# Patient Record
Sex: MALE | Race: WHITE | NOT HISPANIC OR LATINO | Employment: OTHER | URBAN - NONMETROPOLITAN AREA
[De-identification: names, ages, dates, MRNs, and addresses within clinical notes are randomized per-mention and may not be internally consistent; named-entity substitution may affect disease eponyms.]

---

## 2017-02-01 RX ORDER — AMLODIPINE BESYLATE AND BENAZEPRIL HYDROCHLORIDE 5; 20 MG/1; MG/1
CAPSULE ORAL
Qty: 90 CAPSULE | Refills: 2 | Status: SHIPPED | OUTPATIENT
Start: 2017-02-01 | End: 2017-10-01 | Stop reason: SDUPTHER

## 2017-10-02 RX ORDER — AMLODIPINE BESYLATE AND BENAZEPRIL HYDROCHLORIDE 5; 20 MG/1; MG/1
CAPSULE ORAL
Qty: 90 CAPSULE | Refills: 0 | Status: SHIPPED | OUTPATIENT
Start: 2017-10-02 | End: 2017-11-21 | Stop reason: SDUPTHER

## 2017-10-18 DIAGNOSIS — E78.5 HYPERLIPIDEMIA, UNSPECIFIED HYPERLIPIDEMIA TYPE: Primary | ICD-10-CM

## 2017-10-18 DIAGNOSIS — I10 ESSENTIAL HYPERTENSION: ICD-10-CM

## 2017-10-18 DIAGNOSIS — Z12.5 SCREENING FOR MALIGNANT NEOPLASM OF PROSTATE: ICD-10-CM

## 2017-10-18 DIAGNOSIS — R73.01 IMPAIRED FASTING GLUCOSE: ICD-10-CM

## 2017-11-07 ENCOUNTER — LAB (OUTPATIENT)
Dept: LAB | Facility: OTHER | Age: 57
End: 2017-11-07

## 2017-11-07 DIAGNOSIS — R73.01 IMPAIRED FASTING GLUCOSE: ICD-10-CM

## 2017-11-07 DIAGNOSIS — I10 ESSENTIAL HYPERTENSION: ICD-10-CM

## 2017-11-07 DIAGNOSIS — Z12.5 SCREENING FOR MALIGNANT NEOPLASM OF PROSTATE: ICD-10-CM

## 2017-11-07 DIAGNOSIS — E78.5 HYPERLIPIDEMIA, UNSPECIFIED HYPERLIPIDEMIA TYPE: ICD-10-CM

## 2017-11-07 LAB
ALBUMIN SERPL-MCNC: 4.5 G/DL (ref 3.2–5.5)
ALBUMIN/GLOB SERPL: 1.7 G/DL (ref 1–3)
ALP SERPL-CCNC: 65 U/L (ref 15–121)
ALT SERPL W P-5'-P-CCNC: 22 U/L (ref 10–60)
ANION GAP SERPL CALCULATED.3IONS-SCNC: 8 MMOL/L (ref 5–15)
AST SERPL-CCNC: 24 U/L (ref 10–60)
BASOPHILS # BLD AUTO: 0.03 10*3/MM3 (ref 0–0.2)
BASOPHILS NFR BLD AUTO: 0.7 % (ref 0–2)
BILIRUB SERPL-MCNC: 1.7 MG/DL (ref 0.2–1)
BUN BLD-MCNC: 19 MG/DL (ref 8–25)
BUN/CREAT SERPL: 15.8 (ref 7–25)
CALCIUM SPEC-SCNC: 9.8 MG/DL (ref 8.4–10.8)
CHLORIDE SERPL-SCNC: 103 MMOL/L (ref 100–112)
CHOLEST SERPL-MCNC: 149 MG/DL (ref 150–200)
CO2 SERPL-SCNC: 29 MMOL/L (ref 20–32)
CREAT BLD-MCNC: 1.2 MG/DL (ref 0.4–1.3)
DEPRECATED RDW RBC AUTO: 44.5 FL (ref 35.1–43.9)
EOSINOPHIL # BLD AUTO: 0.16 10*3/MM3 (ref 0–0.7)
EOSINOPHIL NFR BLD AUTO: 3.5 % (ref 0–7)
ERYTHROCYTE [DISTWIDTH] IN BLOOD BY AUTOMATED COUNT: 13.4 % (ref 11.5–14.5)
GFR SERPL CREATININE-BSD FRML MDRD: 62 ML/MIN/1.73 (ref 56–130)
GLOBULIN UR ELPH-MCNC: 2.6 GM/DL (ref 2.5–4.6)
GLUCOSE BLD-MCNC: 109 MG/DL (ref 70–100)
HBA1C MFR BLD: 5.4 % (ref 4–5.6)
HCT VFR BLD AUTO: 43.7 % (ref 39–49)
HDLC SERPL-MCNC: 34 MG/DL (ref 35–100)
HGB BLD-MCNC: 14.4 G/DL (ref 13.7–17.3)
LDLC SERPL CALC-MCNC: 102 MG/DL
LDLC/HDLC SERPL: 3.01 {RATIO}
LYMPHOCYTES # BLD AUTO: 0.96 10*3/MM3 (ref 0.6–4.2)
LYMPHOCYTES NFR BLD AUTO: 20.9 % (ref 10–50)
MCH RBC QN AUTO: 30.6 PG (ref 26.5–34)
MCHC RBC AUTO-ENTMCNC: 33 G/DL (ref 31.5–36.3)
MCV RBC AUTO: 93 FL (ref 80–98)
MONOCYTES # BLD AUTO: 0.54 10*3/MM3 (ref 0–0.9)
MONOCYTES NFR BLD AUTO: 11.8 % (ref 0–12)
NEUTROPHILS # BLD AUTO: 2.9 10*3/MM3 (ref 2–8.6)
NEUTROPHILS NFR BLD AUTO: 63.1 % (ref 37–80)
PLATELET # BLD AUTO: 192 10*3/MM3 (ref 150–450)
PMV BLD AUTO: 10.4 FL (ref 8–12)
POTASSIUM BLD-SCNC: 3.8 MMOL/L (ref 3.4–5.4)
PROT SERPL-MCNC: 7.1 G/DL (ref 6.7–8.2)
PSA SERPL-MCNC: 3.5 NG/ML (ref 0–4)
RBC # BLD AUTO: 4.7 10*6/MM3 (ref 4.37–5.74)
SODIUM BLD-SCNC: 140 MMOL/L (ref 134–146)
TRIGL SERPL-MCNC: 63 MG/DL (ref 35–160)
VLDLC SERPL-MCNC: 12.6 MG/DL
WBC NRBC COR # BLD: 4.59 10*3/MM3 (ref 3.2–9.8)

## 2017-11-07 PROCEDURE — 36415 COLL VENOUS BLD VENIPUNCTURE: CPT | Performed by: INTERNAL MEDICINE

## 2017-11-07 PROCEDURE — 83036 HEMOGLOBIN GLYCOSYLATED A1C: CPT | Performed by: INTERNAL MEDICINE

## 2017-11-07 PROCEDURE — 85025 COMPLETE CBC W/AUTO DIFF WBC: CPT | Performed by: INTERNAL MEDICINE

## 2017-11-07 PROCEDURE — 80061 LIPID PANEL: CPT | Performed by: INTERNAL MEDICINE

## 2017-11-07 PROCEDURE — 80053 COMPREHEN METABOLIC PANEL: CPT | Performed by: INTERNAL MEDICINE

## 2017-11-07 PROCEDURE — 84153 ASSAY OF PSA TOTAL: CPT | Performed by: INTERNAL MEDICINE

## 2017-11-21 ENCOUNTER — OFFICE VISIT (OUTPATIENT)
Dept: FAMILY MEDICINE CLINIC | Facility: CLINIC | Age: 57
End: 2017-11-21

## 2017-11-21 VITALS
HEIGHT: 73 IN | DIASTOLIC BLOOD PRESSURE: 80 MMHG | SYSTOLIC BLOOD PRESSURE: 160 MMHG | TEMPERATURE: 98 F | BODY MASS INDEX: 28.76 KG/M2 | HEART RATE: 68 BPM | WEIGHT: 217 LBS

## 2017-11-21 DIAGNOSIS — I10 ESSENTIAL HYPERTENSION: Primary | Chronic | ICD-10-CM

## 2017-11-21 DIAGNOSIS — R73.01 IMPAIRED FASTING GLUCOSE: Chronic | ICD-10-CM

## 2017-11-21 DIAGNOSIS — E78.2 MIXED HYPERLIPIDEMIA: Chronic | ICD-10-CM

## 2017-11-21 DIAGNOSIS — Z12.5 SPECIAL SCREENING FOR MALIGNANT NEOPLASM OF PROSTATE: ICD-10-CM

## 2017-11-21 DIAGNOSIS — E78.1 HYPERTRIGLYCERIDEMIA: Chronic | ICD-10-CM

## 2017-11-21 DIAGNOSIS — M72.2 PLANTAR FASCIITIS: Chronic | ICD-10-CM

## 2017-11-21 DIAGNOSIS — K21.9 GASTROESOPHAGEAL REFLUX DISEASE WITHOUT ESOPHAGITIS: Chronic | ICD-10-CM

## 2017-11-21 PROCEDURE — 99214 OFFICE O/P EST MOD 30 MIN: CPT | Performed by: INTERNAL MEDICINE

## 2017-11-21 RX ORDER — BISOPROLOL FUMARATE 5 MG/1
TABLET, FILM COATED ORAL
Qty: 45 TABLET | Refills: 3 | Status: SHIPPED | OUTPATIENT
Start: 2017-11-21 | End: 2018-11-28 | Stop reason: SDUPTHER

## 2017-11-21 RX ORDER — AMLODIPINE BESYLATE AND BENAZEPRIL HYDROCHLORIDE 5; 20 MG/1; MG/1
CAPSULE ORAL
Qty: 90 CAPSULE | Refills: 3 | Status: SHIPPED | OUTPATIENT
Start: 2017-11-21 | End: 2018-11-28 | Stop reason: SDUPTHER

## 2017-11-21 RX ORDER — OMEPRAZOLE 40 MG/1
40 CAPSULE, DELAYED RELEASE ORAL DAILY
Qty: 90 CAPSULE | Refills: 3 | Status: SHIPPED | OUTPATIENT
Start: 2017-11-21 | End: 2018-11-28 | Stop reason: SDUPTHER

## 2017-11-21 RX ORDER — VALSARTAN AND HYDROCHLOROTHIAZIDE 320; 25 MG/1; MG/1
1 TABLET, FILM COATED ORAL DAILY
Qty: 90 TABLET | Refills: 3 | Status: SHIPPED | OUTPATIENT
Start: 2017-11-21 | End: 2018-11-28 | Stop reason: SDUPTHER

## 2017-11-21 RX ORDER — VALSARTAN AND HYDROCHLOROTHIAZIDE 320; 25 MG/1; MG/1
1 TABLET, FILM COATED ORAL DAILY
COMMUNITY
End: 2017-11-21 | Stop reason: SDUPTHER

## 2017-11-21 NOTE — PROGRESS NOTES
Subjective        History of Present Illness     Rashaun Quintanilla is a 57 y.o. me who presents for annual follow up on hypertension, dyslipidemia, impaired fasting glucose, and GERD.  Rashaun has a history of tortuous/ectatic thoracic aorta and has had serial chest x-rays on an annual basis for several years, but we discontinued the annual evaluation to decrease radiation exposure.  Thoracic aorta x-ray 11/2016 revealed stability, and fact, the radiologist did not feel that the aorta was abnormal..    Weight is down 5 pounds in the past year.  Blood pressure is slightly above goal today.      Last colonoscopy was 11/2008. He will be due repeat in 2018.    He struggles with episodic flares of plantar fasciitis bilaterally.  Custom orthotic insoles were extremely helpful.  He is requesting a new prescription for new custom orthotic insoles, and I provided that today.    He is reporting some occasional episodes of rectal seepage.  On occasion, he will feel that there has been a little liquid  leaking from the rectum.  He will go to the bathroom and have the need to clean himself.  Usually, there is a small amount of brown liquid.  He denies more prominent fecal incontinence.  He denies any past rectal surgeries or procedures.  He is in a monogamous heterosexual marriage.    Renal function is stable with creatinine 1.2.  I continue to recommend he avoid chronic use of NSAIDs and other nephrotoxic drugs.     The patient's relevant past medical, surgical, and social history was reviewed in Epic.   Lab results are reviewed with the patient today.  CBC unremarkable.  Fasting glucose 109.  A1c is 5.4. Total cholesterol 149.   HDL 49.  .  Triglycerides 63.         Review of Systems   Constitutional: Negative for chills, fatigue and fever.   HENT: Negative for congestion, ear pain, postnasal drip, sinus pressure and sore throat.    Respiratory: Negative for cough, shortness of breath and wheezing.    Cardiovascular:  "Negative for chest pain, palpitations and leg swelling.   Gastrointestinal: Negative for abdominal pain, blood in stool, constipation, diarrhea, nausea and vomiting.   Endocrine: Negative for cold intolerance, heat intolerance, polydipsia and polyuria.   Genitourinary: Negative for dysuria, frequency, hematuria and urgency.   Skin: Negative for rash.   Neurological: Negative for syncope and weakness.        Objective     Vitals:    11/21/17 1053   BP: 160/80   Pulse: 68   Temp: 98 °F (36.7 °C)   TempSrc: Oral   Weight: 217 lb (98.4 kg)   Height: 73\" (185.4 cm)     Physical Exam   Constitutional: He is oriented to person, place, and time. He appears well-developed and well-nourished. No distress.   HENT:   Head: Normocephalic and atraumatic.   Nose: Right sinus exhibits no maxillary sinus tenderness and no frontal sinus tenderness. Left sinus exhibits no maxillary sinus tenderness and no frontal sinus tenderness.   Mouth/Throat: Uvula is midline, oropharynx is clear and moist and mucous membranes are normal. No oral lesions. No tonsillar exudate.   Eyes: Conjunctivae and EOM are normal. Pupils are equal, round, and reactive to light.   Neck: Trachea normal. Neck supple. No JVD present. Carotid bruit is not present. No tracheal deviation present. No thyroid mass and no thyromegaly present.   Cardiovascular: Normal rate, regular rhythm and normal heart sounds.   No extrasystoles are present. PMI is not displaced.    No murmur heard.  Pulmonary/Chest: Effort normal and breath sounds normal. No accessory muscle usage. No respiratory distress. He has no decreased breath sounds. He has no wheezes. He has no rhonchi. He has no rales.   Abdominal: Soft. Bowel sounds are normal. He exhibits no distension. There is no hepatosplenomegaly. There is no tenderness.       Vascular Status -  His exam exhibits right foot vasculature normal. His exam exhibits no right foot edema. His exam exhibits left foot vasculature normal. His " exam exhibits no left foot edema.  Lymphadenopathy:     He has no cervical adenopathy.   Neurological: He is alert and oriented to person, place, and time. No cranial nerve deficit. Coordination normal.   Skin: Skin is warm, dry and intact. No rash noted. No cyanosis. Nails show no clubbing.   Psychiatric: He has a normal mood and affect. His speech is normal and behavior is normal. Thought content normal.   Vitals reviewed.        Assessment/Plan      Continue the current antihypertensive regimen.  Monitor BP.  Recommended sodium restriction.  Notify me if systolic blood pressure is not consistently less than 140.    I would prefer not to have this patient on both ACE inhibitor and ARB, but without it, he would require blood pressure medication with greater side effect potential.  If his renal function starts to deteriorate on dual CONOR therapy, we will make appropriate changes.    He will be due repeat colonoscopy next year.  We will plan to schedule that with next year's annual exam.  We reviewed some exercises to try to strengthen the pubococcygeus muscles and muscles of the perineal area.  Notify me if the episodic rectal leakage persists or worsens.    I have given him a prescription for custom orthotic insoles to help address his plantar fasciitis.  If the cost of these becomes prohibitive, I also recommended power step insoles.    Continue Prilosec and other prescription medications and vitamin and mineral supplements to treat additional medical problems which we addressed today.  Return in one year for annual exam with fasting labs one week prior.        Scribed for Dr. Hammonds by Arlette Redd [facility].     Diagnoses and all orders for this visit:    Essential hypertension  -     CBC Auto Differential; Future  -     Comprehensive Metabolic Panel; Future  -     Hemoglobin A1c; Future  -     Lipid Panel; Future  -     PSA Screen; Future  -     TSH; Future    Mixed hyperlipidemia  -     CBC Auto Differential;  Future  -     Comprehensive Metabolic Panel; Future  -     Hemoglobin A1c; Future  -     Lipid Panel; Future  -     PSA Screen; Future  -     TSH; Future    Hypertriglyceridemia    Gastroesophageal reflux disease without esophagitis    Impaired fasting glucose  -     Hemoglobin A1c; Future    Special screening for malignant neoplasm of prostate  -     PSA Screen; Future    Plantar fasciitis    Other orders  -     Discontinue: valsartan-hydrochlorothiazide (DIOVAN-HCT) 320-25 MG per tablet; Take 1 tablet by mouth Daily.  -     amLODIPine-benazepril (LOTREL 5-20) 5-20 MG per capsule; Take one capsule daily  -     bisoprolol (ZEBeta) 5 MG tablet; Take 1/2 tablet daily  -     valsartan-hydrochlorothiazide (DIOVAN-HCT) 320-25 MG per tablet; Take 1 tablet by mouth Daily.  -     omeprazole (priLOSEC) 40 MG capsule; Take 1 capsule by mouth Daily.        Lab on 11/07/2017   Component Date Value Ref Range Status   • Glucose 11/07/2017 109* 70 - 100 mg/dL Final   • BUN 11/07/2017 19  8 - 25 mg/dL Final   • Creatinine 11/07/2017 1.20  0.40 - 1.30 mg/dL Final   • Sodium 11/07/2017 140  134 - 146 mmol/L Final   • Potassium 11/07/2017 3.8  3.4 - 5.4 mmol/L Final   • Chloride 11/07/2017 103  100 - 112 mmol/L Final   • CO2 11/07/2017 29.0  20.0 - 32.0 mmol/L Final   • Calcium 11/07/2017 9.8  8.4 - 10.8 mg/dL Final   • Total Protein 11/07/2017 7.1  6.7 - 8.2 g/dL Final   • Albumin 11/07/2017 4.50  3.20 - 5.50 g/dL Final   • ALT (SGPT) 11/07/2017 22  10 - 60 U/L Final   • AST (SGOT) 11/07/2017 24  10 - 60 U/L Final   • Alkaline Phosphatase 11/07/2017 65  15 - 121 U/L Final   • Total Bilirubin 11/07/2017 1.7* 0.2 - 1.0 mg/dL Final   • eGFR Non African Amer 11/07/2017 62  56 - 130 mL/min/1.73 Final   • Globulin 11/07/2017 2.6  2.5 - 4.6 gm/dL Final   • A/G Ratio 11/07/2017 1.7  1.0 - 3.0 g/dL Final   • BUN/Creatinine Ratio 11/07/2017 15.8  7.0 - 25.0 Final   • Anion Gap 11/07/2017 8.0  5.0 - 15.0 mmol/L Final   • Hemoglobin A1C  11/07/2017 5.4  4 - 5.6 % Final   • Total Cholesterol 11/07/2017 149* 150 - 200 mg/dL Final   • Triglycerides 11/07/2017 63  35 - 160 mg/dL Final   • HDL Cholesterol 11/07/2017 34* 35 - 100 mg/dL Final   • LDL Cholesterol  11/07/2017 102  mg/dL Final   • VLDL Cholesterol 11/07/2017 12.6  mg/dL Final   • LDL/HDL Ratio 11/07/2017 3.01   Final   • PSA 11/07/2017 3.500  0.000 - 4.000 ng/mL Final   • WBC 11/07/2017 4.59  3.20 - 9.80 10*3/mm3 Final   • RBC 11/07/2017 4.70  4.37 - 5.74 10*6/mm3 Final   • Hemoglobin 11/07/2017 14.4  13.7 - 17.3 g/dL Final   • Hematocrit 11/07/2017 43.7  39.0 - 49.0 % Final   • MCV 11/07/2017 93.0  80.0 - 98.0 fL Final   • MCH 11/07/2017 30.6  26.5 - 34.0 pg Final   • MCHC 11/07/2017 33.0  31.5 - 36.3 g/dL Final   • RDW 11/07/2017 13.4  11.5 - 14.5 % Final   • RDW-SD 11/07/2017 44.5* 35.1 - 43.9 fl Final   • MPV 11/07/2017 10.4  8.0 - 12.0 fL Final   • Platelets 11/07/2017 192  150 - 450 10*3/mm3 Final   • Neutrophil % 11/07/2017 63.1  37.0 - 80.0 % Final   • Lymphocyte % 11/07/2017 20.9  10.0 - 50.0 % Final   • Monocyte % 11/07/2017 11.8  0.0 - 12.0 % Final   • Eosinophil % 11/07/2017 3.5  0.0 - 7.0 % Final   • Basophil % 11/07/2017 0.7  0.0 - 2.0 % Final   • Neutrophils, Absolute 11/07/2017 2.90  2.00 - 8.60 10*3/mm3 Final   • Lymphocytes, Absolute 11/07/2017 0.96  0.60 - 4.20 10*3/mm3 Final   • Monocytes, Absolute 11/07/2017 0.54  0.00 - 0.90 10*3/mm3 Final   • Eosinophils, Absolute 11/07/2017 0.16  0.00 - 0.70 10*3/mm3 Final   • Basophils, Absolute 11/07/2017 0.03  0.00 - 0.20 10*3/mm3 Final   ]

## 2017-11-21 NOTE — PATIENT INSTRUCTIONS
Exercising to Lose Weight  Exercising can help you to lose weight. In order to lose weight through exercise, you need to do vigorous-intensity exercise. You can tell that you are exercising with vigorous intensity if you are breathing very hard and fast and cannot hold a conversation while exercising.  Moderate-intensity exercise helps to maintain your current weight. You can tell that you are exercising at a moderate level if you have a higher heart rate and faster breathing, but you are still able to hold a conversation.  HOW OFTEN SHOULD I EXERCISE?  Choose an activity that you enjoy and set realistic goals. Your health care provider can help you to make an activity plan that works for you. Exercise regularly as directed by your health care provider. This may include:  · Doing resistance training twice each week, such as:    Push-ups.    Sit-ups.    Lifting weights.    Using resistance bands.  · Doing a given intensity of exercise for a given amount of time. Choose from these options:    150 minutes of moderate-intensity exercise every week.    75 minutes of vigorous-intensity exercise every week.    A mix of moderate-intensity and vigorous-intensity exercise every week.  Children, pregnant women, people who are out of shape, people who are overweight, and older adults may need to consult a health care provider for individual recommendations. If you have any sort of medical condition, be sure to consult your health care provider before starting a new exercise program.  WHAT ARE SOME ACTIVITIES THAT CAN HELP ME TO LOSE WEIGHT?   · Walking at a rate of at least 4.5 miles an hour.  · Jogging or running at a rate of 5 miles per hour.  · Biking at a rate of at least 10 miles per hour.  · Lap swimming.  · Roller-skating or in-line skating.  · Cross-country skiing.  · Vigorous competitive sports, such as football, basketball, and soccer.  · Jumping rope.  · Aerobic dancing.  HOW CAN I BE MORE ACTIVE IN MY DAY-TO-DAY  ACTIVITIES?  · Use the stairs instead of the elevator.  · Take a walk during your lunch break.  · If you drive, park your car farther away from work or school.  · If you take public transportation, get off one stop early and walk the rest of the way.  · Make all of your phone calls while standing up and walking around.  · Get up, stretch, and walk around every 30 minutes throughout the day.  WHAT GUIDELINES SHOULD I FOLLOW WHILE EXERCISING?  · Do not exercise so much that you hurt yourself, feel dizzy, or get very short of breath.  · Consult your health care provider prior to starting a new exercise program.  · Wear comfortable clothes and shoes with good support.  · Drink plenty of water while you exercise to prevent dehydration or heat stroke. Body water is lost during exercise and must be replaced.  · Work out until you breathe faster and your heart beats faster.     This information is not intended to replace advice given to you by your health care provider. Make sure you discuss any questions you have with your health care provider.     Document Released: 01/20/2012 Document Revised: 01/08/2016 Document Reviewed: 05/21/2015  [employer] Interactive Patient Education ©2017 [employer]      Calorie Counting for Weight Loss  Calories are energy you get from the things you eat and drink. Your body uses this energy to keep you going throughout the day. The number of calories you eat affects your weight. When you eat more calories than your body needs, your body stores the extra calories as fat. When you eat fewer calories than your body needs, your body burns fat to get the energy it needs.  Calorie counting means keeping track of how many calories you eat and drink each day. If you make sure to eat fewer calories than your body needs, you should lose weight. In order for calorie counting to work, you will need to eat the number of calories that are right for you in a day to lose a healthy amount of weight per week.  A healthy amount of weight to lose per week is usually 1-2 lb (0.5-0.9 kg). A dietitian can determine how many calories you need in a day and give you suggestions on how to reach your calorie goal.   WHAT IS MY MY PLAN?  My goal is to have __________ calories per day.   If I have this many calories per day, I should lose around __________ pounds per week.  WHAT DO I NEED TO KNOW ABOUT CALORIE COUNTING?  In order to meet your daily calorie goal, you will need to:  · Find out how many calories are in each food you would like to eat. Try to do this before you eat.  · Decide how much of the food you can eat.  · Write down what you ate and how many calories it had. Doing this is called keeping a food log.  WHERE DO I FIND CALORIE INFORMATION?  The number of calories in a food can be found on a Nutrition Facts label. Note that all the information on a label is based on a specific serving of the food. If a food does not have a Nutrition Facts label, try to look up the calories online or ask your dietitian for help.  HOW DO I DECIDE HOW MUCH TO EAT?  To decide how much of the food you can eat, you will need to consider both the number of calories in one serving and the size of one serving. This information can be found on the Nutrition Facts label. If a food does not have a Nutrition Facts label, look up the information online or ask your dietitian for help.  Remember that calories are listed per serving. If you choose to have more than one serving of a food, you will have to multiply the calories per serving by the amount of servings you plan to eat. For example, the label on a package of bread might say that a serving size is 1 slice and that there are 90 calories in a serving. If you eat 1 slice, you will have eaten 90 calories. If you eat 2 slices, you will have eaten 180 calories.  HOW DO I KEEP A FOOD LOG?  After each meal, record the following information in your food log:  · What you ate.  · How much of it you  ate.  · How many calories it had.  · Then, add up your calories.  Keep your food log near you, such as in a small notebook in your pocket. Another option is to use a mobile marcus or website. Some programs will calculate calories for you and show you how many calories you have left each time you add an item to the log.  WHAT ARE SOME CALORIE COUNTING TIPS?  · Use your calories on foods and drinks that will fill you up and not leave you hungry. Some examples of this include foods like nuts and nut butters, vegetables, lean proteins, and high-fiber foods (more than 5 g fiber per serving).  · Eat nutritious foods and avoid empty calories. Empty calories are calories you get from foods or beverages that do not have many nutrients, such as candy and soda. It is better to have a nutritious high-calorie food (such as an avocado) than a food with few nutrients (such as a bag of chips).  · Know how many calories are in the foods you eat most often. This way, you do not have to look up how many calories they have each time you eat them.  · Look out for foods that may seem like low-calorie foods but are really high-calorie foods, such as baked goods, soda, and fat-free candy.  · Pay attention to calories in drinks. Drinks such as sodas, specialty coffee drinks, alcohol, and juices have a lot of calories yet do not fill you up. Choose low-calorie drinks like water and diet drinks.  · Focus your calorie counting efforts on higher calorie items. Logging the calories in a garden salad that contains only vegetables is less important than calculating the calories in a milk shake.  · Find a way of tracking calories that works for you. Get creative. Most people who are successful find ways to keep track of how much they eat in a day, even if they do not count every calorie.  WHAT ARE SOME PORTION CONTROL TIPS?  · Know how many calories are in a serving. This will help you know how many servings of a certain food you can have.  · Use a  measuring cup to measure serving sizes. This is helpful when you start out. With time, you will be able to estimate serving sizes for some foods.  · Take some time to put servings of different foods on your favorite plates, bowls, and cups so you know what a serving looks like.  · Try not to eat straight from a bag or box. Doing this can lead to overeating. Put the amount you would like to eat in a cup or on a plate to make sure you are eating the right portion.  · Use smaller plates, glasses, and bowls to prevent overeating. This is a quick and easy way to practice portion control. If your plate is smaller, less food can fit on it.  · Try not to multitask while eating, such as watching TV or using your computer. If it is time to eat, sit down at a table and enjoy your food. Doing this will help you to start recognizing when you are full. It will also make you more aware of what and how much you are eating.  HOW CAN I CALORIE COUNT WHEN EATING OUT?  · Ask for smaller portion sizes or child-sized portions.  · Consider sharing an entree and sides instead of getting your own entree.  · If you get your own entree, eat only half. Ask for a box at the beginning of your meal and put the rest of your entree in it so you are not tempted to eat it.  · Look for the calories on the menu. If calories are listed, choose the lower calorie options.  · Choose dishes that include vegetables, fruits, whole grains, low-fat dairy products, and lean protein. Focusing on smart food choices from each of the 5 food groups can help you stay on track at restaurants.  · Choose items that are boiled, broiled, grilled, or steamed.  · Choose water, milk, unsweetened iced tea, or other drinks without added sugars. If you want an alcoholic beverage, choose a lower calorie option. For example, a regular sarah can have up to 700 calories and a glass of wine has around 150.  · Stay away from items that are buttered, battered, fried, or served with  "cream sauce. Items labeled \"crispy\" are usually fried, unless stated otherwise.  · Ask for dressings, sauces, and syrups on the side. These are usually very high in calories, so do not eat much of them.  · Watch out for salads. Many people think salads are a healthy option, but this is often not the case. Many salads come with coon, fried chicken, lots of cheese, fried chips, and dressing. All of these items have a lot of calories. If you want a salad, choose a garden salad and ask for grilled meats or steak. Ask for the dressing on the side, or ask for olive oil and vinegar or lemon to use as dressing.  · Estimate how many servings of a food you are given. For example, a serving of cooked rice is ½ cup or about the size of half a tennis ball or one cupcake wrapper. Knowing serving sizes will help you be aware of how much food you are eating at restaurants. The list below tells you how big or small some common portion sizes are based on everyday objects.    1 oz--4 stacked dice.    3 oz--1 deck of cards.    1 tsp--1 dice.    1 Tbsp--½ a Ping-Pong ball.    2 Tbsp--1 Ping-Pong ball.    ½ cup--1 tennis ball or 1 cupcake wrapper.    1 cup--1 baseball.     This information is not intended to replace advice given to you by your health care provider. Make sure you discuss any questions you have with your health care provider.     Document Released: 12/18/2006 Document Revised: 01/08/2016 Document Reviewed: 10/23/2014  [employer] Interactive Patient Education ©2017 [employer]    "

## 2017-12-12 ENCOUNTER — HOSPITAL ENCOUNTER (OUTPATIENT)
Dept: PHYSICAL THERAPY | Facility: HOSPITAL | Age: 57
Setting detail: THERAPIES SERIES
Discharge: HOME | End: 2017-12-12

## 2017-12-12 ENCOUNTER — TRANSCRIBE ORDERS (OUTPATIENT)
Dept: FAMILY MEDICINE CLINIC | Facility: CLINIC | Age: 57
End: 2017-12-12

## 2017-12-12 DIAGNOSIS — M72.2 PLANTAR FASCIAL FIBROMATOSIS: Primary | ICD-10-CM

## 2017-12-12 PROCEDURE — 97161 PT EVAL LOW COMPLEX 20 MIN: CPT | Performed by: PHYSICAL THERAPIST

## 2017-12-12 NOTE — THERAPY EVALUATION
Outpatient Physical Therapy Ortho Initial Evaluation  [facility]     Patient Name: Rashaun Quintanilla  : 1960  MRN: [id number]  Today's Date: 2017      Visit Date: 2017    Patient Active Problem List   Diagnosis   • Chest pain   • Essential hypertension   • Viral gastroenteritis   • Ventricular premature depolarization   • Sprain of foot, right   • Screening for malignant neoplasm of prostate   • Plantar fasciitis   • Osteoarthritis of knee   • Male erectile disorder   • Lung nodule, solitary   • Lipoma of skin and subcutaneous tissue of neck   • Knee pain   • Joint effusion of the lower leg   • Injury of ankle, left   • Impaired fasting glucose   • Hypertriglyceridemia   • Hyperlipidemia   • H/O peptic ulcer   • GERD (gastroesophageal reflux disease)   • Gastritis   • Foot pain   • Eczema of hand   • Dysuria   • Dyslipidemia   • Cardiomegaly   • Allergic rhinitis   • Abnormal chest x-ray   • Benign non-nodular prostatic hyperplasia with lower urinary tract symptoms        Past Medical History:   Diagnosis Date   • Abnormal chest x-ray    • Allergic rhinitis     Seasonal    • Cardiomegaly     Tendencies towards      • Chest pain    • Dyslipidemia    • Dysuria    • Eczema of hand     Bilateral hands      • Essential hypertension     On multidrug therapy      • Foot pain    • Gastritis    • GERD (gastroesophageal reflux disease)     Change omeprazole to Zantac twice daily, 2016      • H/O peptic ulcer    • Hyperlipidemia     Currently diet controlled      • Hypertriglyceridemia     low HDL      • Impaired fasting glucose     Impaired fasting glycaemia      • Injury of ankle, left     Left ankle sprain      • Joint effusion of the lower leg     Right knee. With episodic pain and occasional instability      • Knee pain     Right. With episodes of mild medial instability   • Lipoma of skin and subcutaneous tissue of neck      Apparent subepidermal cyst anterior neck. Not infected      • Lung  nodule, solitary     Right upper lobe, noted at [facility] urgent care, 5/2016. Chest x-ray repeated by me, 5/16/2016   • Male erectile disorder     exacerbated by HCTZ      • Osteoarthritis of knee     O/A, DJD      • Plantar fasciitis    • Screening for malignant neoplasm of prostate    • Sprain of foot, right    • Ventricular premature depolarization     Unifocal PVCs noted on [facility] screening EKG, summer 2015. started bisoprolol and referred to Dr. Braun, 11/2015.        Past Surgical History:   Procedure Laterality Date   • ENDOSCOPY  11/2008    Colon endoscopy 93378 (1)        Visit Dx:     ICD-10-CM ICD-9-CM   1. Plantar fascial fibromatosis M72.2 728.71             Patient History       12/12/17 0815          History    Chief Complaint Other 1 (comment)   Need for new orthotics   -BB      Date Current Problem(s) Began --   Chronic   -BB      Brief Description of Current Complaint Present today with need for new orthotics with current pair wore out and broken with date of November 2015. Reports history of 4 or so pairs of orthotics and notes that he can tell when he wears shoes without them. Notes that with the orthotics the pain in his feet is relieved. No numbness or tingling. No sores reported  -BB      Previous treatment for THIS PROBLEM --   History of orthotics  -BB      Patient/Caregiver Goals Other (comment)   New orthotics  -BB      Hand Dominance right-handed  -BB      Pain     Pain at Present 0  -BB      Pain at Best 0  -BB      Pain at Worst 0  -BB      What Performance Factors Make the Current Problem(s) WORSE? Not wearing orthotics  -BB      What Performance Factors Make the Current Problem(s) BETTER? wearing orthotics  -BB      Pain Comments Reports no pain with orthotics present  -BB      Is your sleep disturbed? No  -BB      Fall Risk Assessment    Any falls in the past year: No  -BB      Number of falls reported in the last 12 months 0  -BB        User Key  (r) = Recorded By, (t) = Taken By, (c)  = Cosigned By    Initials Name Provider Type    ISAIAS Sears PT Physical Therapist                PT Ortho       12/12/17 0815    Subjective Comments    Subjective Comments See patient history   -BB    Precautions and Contraindications    Precautions/Limitations no known precautions/limitations  -BB    Subjective Pain    Able to rate subjective pain? yes  -BB    Pre-Treatment Pain Level 0  -BB    Post-Treatment Pain Level 0  -BB    Posture/Observations    Posture/Observations Comments Bilateral pes planus with overpronation. Bilateral dry cracking heels noted. Current orthotics cracked with loss of support.    -BB    Special Tests/Palpation    Special Tests/Palpation Ankle/Foot  -BB    Foot/Ankle Palpation    Foot/Ankle Palpation? Yes   No significant ttp noted.   -BB    ROM (Range of Motion)    General ROM no range of motion deficits identified  -BB    MMT (Manual Muscle Testing)    General MMT Assessment no strength deficits identified  -BB    Flexibility    Flexibility Tested? Lower Extremity  -BB    Lower Extremity Flexibility    Gastrocnemius WNL;Bilateral:  -BB      User Key  (r) = Recorded By, (t) = Taken By, (c) = Cosigned By    Initials Name Provider Type    ISAIAS Sears PT Physical Therapist                      Therapy Education  Education Details: wear schedule, lotion tx for dry heels  Given: Symptoms/condition management  Program: New  How Provided: Verbal  Provided to: Patient           PT OP Goals       12/12/17 0815       PT Short Term Goals    STG Date to Achieve 01/02/18  -BB     STG 1 Good fit/mold of orthotics   -BB     STG 1 Progress New  -BB     STG 2 Independent in wear/care of orthotics  -BB     STG 2 Progress New  -BB     Time Calculation    PT Goal Re-Cert Due Date 01/02/18  -BB       User Key  (r) = Recorded By, (t) = Taken By, (c) = Cosigned By    Initials Name Provider Type    ISAIAS Sears PT Physical Therapist                PT Assessment/Plan       12/12/17 0815        PT Assessment    Functional Limitations Impaired gait  -BB     Impairments Gait;Pain  -BB     Assessment Comments Patient presents today with complaints of current orthotics broken and lose of support that were made 2015 and wishing for a replacement pair. Patient reports current orthotics have worked well without complaints and would like a duplicate pair. Patient has limited great toe extension, bilateral pes planus with increased cassius abnormality noted along 5th ray. Patient could benefit from custom orthotics to continue to improve biomechanics during gait to continue to assist plantar fasciitis. Aqua temps to be made with full posting.   -BB     Please refer to paper survey for additional self-reported information No  -BB     Rehab Potential Good  -BB     Patient/caregiver participated in establishment of treatment plan and goals Yes  -BB     Patient would benefit from skilled therapy intervention Yes  -BB     PT Plan    Predicted Duration of Therapy Intervention (days/wks) Orthotic pickup   -BB     Planned CPT's? PT EVAL LOW COMPLEXITY: 68750;PT ORTHOTIC MGMT/TRAIN EA 15 MIN: 04423  -BB     Physical Therapy Interventions (Optional Details) orthotic fitting/training  -BB     PT Plan Comments Orthotic pickup   -BB       User Key  (r) = Recorded By, (t) = Taken By, (c) = Cosigned By    Initials Name Provider Type    BB Kaitlin Sears, PT Physical Therapist                  Exercises       12/12/17 0815          Subjective Comments    Subjective Comments See patient history   -BB      Subjective Pain    Able to rate subjective pain? yes  -BB      Pre-Treatment Pain Level 0  -BB      Post-Treatment Pain Level 0  -BB        User Key  (r) = Recorded By, (t) = Taken By, (c) = Cosigned By    Initials Name Provider Type    BB Kaitlin Sears, PT Physical Therapist                                  Time Calculation:   Start Time: 0815  Stop Time: 0838  Time Calculation (min): 23 min  Total Timed Code Minutes- PT: 0  minute(s)     Therapy Charges for Today     Code Description Service Date Service Provider Modifiers Qty    67352180712 HC PT EVAL LOW COMPLEXITY 1 12/12/2017 Kaitlin Sears, PT GP 1    36683164986 HC PT-CUSTOM ORTHOTICS-LEVEL 2 12/12/2017 Kaitlin Sears PT  1                    Kaitlin Sears, PT  12/12/2017

## 2018-01-29 ENCOUNTER — OFFICE VISIT (OUTPATIENT)
Dept: FAMILY MEDICINE CLINIC | Facility: CLINIC | Age: 58
End: 2018-01-29

## 2018-01-29 VITALS
WEIGHT: 223 LBS | HEART RATE: 75 BPM | SYSTOLIC BLOOD PRESSURE: 150 MMHG | DIASTOLIC BLOOD PRESSURE: 86 MMHG | HEIGHT: 73 IN | BODY MASS INDEX: 29.55 KG/M2 | TEMPERATURE: 99.1 F | OXYGEN SATURATION: 98 %

## 2018-01-29 DIAGNOSIS — J20.9 ACUTE BRONCHITIS, UNSPECIFIED ORGANISM: Primary | ICD-10-CM

## 2018-01-29 DIAGNOSIS — R50.9 FEVER, UNSPECIFIED FEVER CAUSE: ICD-10-CM

## 2018-01-29 DIAGNOSIS — I10 ESSENTIAL HYPERTENSION: Chronic | ICD-10-CM

## 2018-01-29 DIAGNOSIS — J06.9 VIRAL URI WITH COUGH: ICD-10-CM

## 2018-01-29 LAB
FLUAV AG NPH QL: NEGATIVE
FLUBV AG NPH QL IA: NEGATIVE

## 2018-01-29 PROCEDURE — 99213 OFFICE O/P EST LOW 20 MIN: CPT | Performed by: INTERNAL MEDICINE

## 2018-01-29 PROCEDURE — 87804 INFLUENZA ASSAY W/OPTIC: CPT | Performed by: INTERNAL MEDICINE

## 2018-01-29 RX ORDER — AZITHROMYCIN 250 MG/1
TABLET, FILM COATED ORAL
Qty: 6 TABLET | Refills: 0 | Status: SHIPPED | OUTPATIENT
Start: 2018-01-29 | End: 2018-06-18

## 2018-01-29 RX ORDER — CLONIDINE HYDROCHLORIDE 0.1 MG/1
0.1 TABLET ORAL 2 TIMES DAILY
Qty: 60 TABLET | Refills: 11 | Status: SHIPPED | OUTPATIENT
Start: 2018-01-29 | End: 2018-11-28 | Stop reason: ALTCHOICE

## 2018-01-29 RX ORDER — PHENYLEPHRINE HCL 10 MG/1
TABLET, FILM COATED ORAL
Qty: 36 TABLET | Refills: 0 | Status: SHIPPED | OUTPATIENT
Start: 2018-01-29 | End: 2019-03-20 | Stop reason: SDUPTHER

## 2018-01-29 NOTE — PROGRESS NOTES
Subjective     Rashaun Quintanilla is a 57 y.o. male.     History of Present Illness   Rashaun reports a 2 day history of URI symptoms including nasal congestion and postnasal drip.  He denies sore throat or ear pain.  He denies facial pain or purulent nasal discharge.  He has a mostly nonproductive cough.  Secretions have been clear.  He denies much nasal discharge.  He denies flulike body aches and pains.  He denies headaches or fevers or chills.  He has a low-grade fever today.  No sick contacts, including no recent exposures to influenza to his knowledge.  He used a dose of cough medicine before bed last night and slept well.  He received his influenza vaccination last fall.    The patient's blood pressure has been consistently above goal at home.  He brought in a blood pressure diary for my review revealing systolic blood pressure averaging in the low 150s.  Diastolics are usually normal.  He continues on 5 drug antihypertensive therapy which currently includes both an ACE inhibitor and angiotensin reuptake blocker, but his renal function has been normal.  There is a strong family history for hypertension.  Discussed and considered options today.      Review of Systems   Constitutional: Negative for chills, fatigue and fever.   HENT: Positive for congestion and postnasal drip. Negative for ear pain, sinus pressure and sore throat.    Respiratory: Positive for cough. Negative for shortness of breath and wheezing.    Cardiovascular: Negative for chest pain, palpitations and leg swelling.   Gastrointestinal: Negative for abdominal pain, blood in stool, constipation, diarrhea, nausea and vomiting.   Endocrine: Negative for cold intolerance, heat intolerance, polydipsia and polyuria.   Genitourinary: Negative for dysuria, frequency, hematuria and urgency.   Skin: Negative for rash.   Neurological: Negative for syncope and weakness.       Objective     /86  Pulse 75  Temp 99.1 °F (37.3 °C) (Oral)   Ht 185.4 cm  "(73\")  Wt 101 kg (223 lb)  SpO2 98%  BMI 29.42 kg/m2    Physical Exam   Constitutional: He is oriented to person, place, and time. He appears well-developed and well-nourished. No distress.   HENT:   Head: Normocephalic and atraumatic.   Nose: Right sinus exhibits no maxillary sinus tenderness and no frontal sinus tenderness. Left sinus exhibits no maxillary sinus tenderness and no frontal sinus tenderness.   Mouth/Throat: Uvula is midline, oropharynx is clear and moist and mucous membranes are normal. No oral lesions. No tonsillar exudate.   Nasal mucosa is congested   Eyes: Conjunctivae and EOM are normal. Pupils are equal, round, and reactive to light.   Neck: Trachea normal. Neck supple. No JVD present. Carotid bruit is not present. No tracheal deviation present. No thyroid mass and no thyromegaly present.   Cardiovascular: Normal rate, regular rhythm, normal heart sounds and intact distal pulses.   No extrasystoles are present. PMI is not displaced.    No murmur heard.  Pulmonary/Chest: Effort normal. No accessory muscle usage. No respiratory distress. He has no decreased breath sounds. He has no wheezes. He has no rhonchi. He has no rales.   Some increased bronchial sounds on expiratory phase of cough bilaterally.  No wheezes.   Abdominal: Soft. Bowel sounds are normal. He exhibits no distension. There is no hepatosplenomegaly. There is no tenderness.       Vascular Status -  His exam exhibits right foot vasculature normal. His exam exhibits no right foot edema. His exam exhibits left foot vasculature normal. His exam exhibits no left foot edema.  Lymphadenopathy:     He has no cervical adenopathy.   Neurological: He is alert and oriented to person, place, and time. No cranial nerve deficit. Coordination normal.   Skin: Skin is warm, dry and intact. No rash noted. No cyanosis. Nails show no clubbing.   Psychiatric: He has a normal mood and affect. His speech is normal and behavior is normal. Judgment and " thought content normal.   Vitals reviewed.      PHQ-2/PHQ-9 Depression Screening 11/21/2017   Little interest or pleasure in doing things 0   Feeling down, depressed, or hopeless 0   Trouble falling or staying asleep, or sleeping too much 0   Feeling tired or having little energy 0   Poor appetite or overeating 0   Feeling bad about yourself - or that you are a failure or have let yourself or your family down 0   Trouble concentrating on things, such as reading the newspaper or watching television 0   Moving or speaking so slowly that other people could have noticed. Or the opposite - being so fidgety or restless that you have been moving around a lot more than usual 0   Thoughts that you would be better off dead, or of hurting yourself in some way 0   Total Score 0   If you checked off any problems, how difficult have these problems made it for you to do your work, take care of things at home, or get along with other people? Not difficult at all       Assessment/Plan     Z-Pradeep as directed.  Phenylephrine as needed for congestion.  Delsym cough syrup as needed.  OTC Tylenol for any low-grade fever or aches or pains.    Continue the current doses of Lotrel, bisoprolol, and Diovan HCT.  Add clonidine 0.1 mg twice a day.  Continue to monitor blood pressure at home and notify me if not at goal.    Diagnoses and all orders for this visit:    Acute bronchitis, unspecified organism    Fever, unspecified fever cause  -     Influenza Antigen, Rapid - Swab, Nasopharynx    Viral URI with cough    Essential hypertension    Other orders  -     CloNIDine (CATAPRES) 0.1 MG tablet; Take 1 tablet by mouth 2 (Two) Times a Day.  -     azithromycin (ZITHROMAX) 250 MG tablet; Take 2 tablets the first day, then 1 tablet daily for 4 days.  -     phenylephrine (SUDAFED PE) 10 MG tablet; 1 po 2-3 times daily prn congestion        No visits with results within 3 Week(s) from this visit.  Latest known visit with results is:    Lab on  11/07/2017   Component Date Value Ref Range Status   • Glucose 11/07/2017 109* 70 - 100 mg/dL Final   • BUN 11/07/2017 19  8 - 25 mg/dL Final   • Creatinine 11/07/2017 1.20  0.40 - 1.30 mg/dL Final   • Sodium 11/07/2017 140  134 - 146 mmol/L Final   • Potassium 11/07/2017 3.8  3.4 - 5.4 mmol/L Final   • Chloride 11/07/2017 103  100 - 112 mmol/L Final   • CO2 11/07/2017 29.0  20.0 - 32.0 mmol/L Final   • Calcium 11/07/2017 9.8  8.4 - 10.8 mg/dL Final   • Total Protein 11/07/2017 7.1  6.7 - 8.2 g/dL Final   • Albumin 11/07/2017 4.50  3.20 - 5.50 g/dL Final   • ALT (SGPT) 11/07/2017 22  10 - 60 U/L Final   • AST (SGOT) 11/07/2017 24  10 - 60 U/L Final   • Alkaline Phosphatase 11/07/2017 65  15 - 121 U/L Final   • Total Bilirubin 11/07/2017 1.7* 0.2 - 1.0 mg/dL Final   • eGFR Non African Amer 11/07/2017 62  56 - 130 mL/min/1.73 Final   • Globulin 11/07/2017 2.6  2.5 - 4.6 gm/dL Final   • A/G Ratio 11/07/2017 1.7  1.0 - 3.0 g/dL Final   • BUN/Creatinine Ratio 11/07/2017 15.8  7.0 - 25.0 Final   • Anion Gap 11/07/2017 8.0  5.0 - 15.0 mmol/L Final   • Hemoglobin A1C 11/07/2017 5.4  4 - 5.6 % Final   • Total Cholesterol 11/07/2017 149* 150 - 200 mg/dL Final   • Triglycerides 11/07/2017 63  35 - 160 mg/dL Final   • HDL Cholesterol 11/07/2017 34* 35 - 100 mg/dL Final   • LDL Cholesterol  11/07/2017 102  mg/dL Final   • VLDL Cholesterol 11/07/2017 12.6  mg/dL Final   • LDL/HDL Ratio 11/07/2017 3.01   Final   • PSA 11/07/2017 3.500  0.000 - 4.000 ng/mL Final   • WBC 11/07/2017 4.59  3.20 - 9.80 10*3/mm3 Final   • RBC 11/07/2017 4.70  4.37 - 5.74 10*6/mm3 Final   • Hemoglobin 11/07/2017 14.4  13.7 - 17.3 g/dL Final   • Hematocrit 11/07/2017 43.7  39.0 - 49.0 % Final   • MCV 11/07/2017 93.0  80.0 - 98.0 fL Final   • MCH 11/07/2017 30.6  26.5 - 34.0 pg Final   • MCHC 11/07/2017 33.0  31.5 - 36.3 g/dL Final   • RDW 11/07/2017 13.4  11.5 - 14.5 % Final   • RDW-SD 11/07/2017 44.5* 35.1 - 43.9 fl Final   • MPV 11/07/2017 10.4  8.0 -  12.0 fL Final   • Platelets 11/07/2017 192  150 - 450 10*3/mm3 Final   • Neutrophil % 11/07/2017 63.1  37.0 - 80.0 % Final   • Lymphocyte % 11/07/2017 20.9  10.0 - 50.0 % Final   • Monocyte % 11/07/2017 11.8  0.0 - 12.0 % Final   • Eosinophil % 11/07/2017 3.5  0.0 - 7.0 % Final   • Basophil % 11/07/2017 0.7  0.0 - 2.0 % Final   • Neutrophils, Absolute 11/07/2017 2.90  2.00 - 8.60 10*3/mm3 Final   • Lymphocytes, Absolute 11/07/2017 0.96  0.60 - 4.20 10*3/mm3 Final   • Monocytes, Absolute 11/07/2017 0.54  0.00 - 0.90 10*3/mm3 Final   • Eosinophils, Absolute 11/07/2017 0.16  0.00 - 0.70 10*3/mm3 Final   • Basophils, Absolute 11/07/2017 0.03  0.00 - 0.20 10*3/mm3 Final   ]

## 2018-03-28 ENCOUNTER — DOCUMENTATION (OUTPATIENT)
Dept: PHYSICAL THERAPY | Facility: HOSPITAL | Age: 58
End: 2018-03-28

## 2018-06-18 ENCOUNTER — OFFICE VISIT (OUTPATIENT)
Dept: FAMILY MEDICINE CLINIC | Facility: CLINIC | Age: 58
End: 2018-06-18

## 2018-06-18 VITALS
SYSTOLIC BLOOD PRESSURE: 150 MMHG | TEMPERATURE: 97.9 F | HEART RATE: 64 BPM | HEIGHT: 73 IN | DIASTOLIC BLOOD PRESSURE: 72 MMHG | BODY MASS INDEX: 29.5 KG/M2 | WEIGHT: 222.6 LBS

## 2018-06-18 DIAGNOSIS — I10 ESSENTIAL HYPERTENSION: Primary | Chronic | ICD-10-CM

## 2018-06-18 DIAGNOSIS — L85.3 DRY SKIN DERMATITIS: ICD-10-CM

## 2018-06-18 DIAGNOSIS — J30.1 CHRONIC SEASONAL ALLERGIC RHINITIS DUE TO POLLEN: Chronic | ICD-10-CM

## 2018-06-18 DIAGNOSIS — R09.89 LABILE HYPERTENSION: ICD-10-CM

## 2018-06-18 DIAGNOSIS — Z12.11 COLON CANCER SCREENING: ICD-10-CM

## 2018-06-18 PROCEDURE — 99214 OFFICE O/P EST MOD 30 MIN: CPT | Performed by: INTERNAL MEDICINE

## 2018-06-18 RX ORDER — HYDRALAZINE HYDROCHLORIDE 25 MG/1
TABLET, FILM COATED ORAL
Qty: 360 TABLET | Refills: 3 | Status: SHIPPED | OUTPATIENT
Start: 2018-06-18 | End: 2018-11-28 | Stop reason: SDUPTHER

## 2018-06-18 NOTE — PROGRESS NOTES
Subjective          History of Present Illness     Rashaun Quintanilla is a 58 y.o. male who reports blood pressure has been running above goal.  He was seen in January for elevated blood pressure.  I had him add clonidine 0.1 mg twice daily to his Lotrel, bisoprolol, and Diovan HCT.  He stopped the clonidine as he reports experiencing excessive sedation.  He brings in a blood pressure diary revealing a range over the past four months of systolic blood pressure 120s to 150s with average about 140.  Diastolic blood pressure has been consistently below 90.  We discussed options and I recommended starting low-dose hydralazine.  His blood pressure range is fairly labile and variable.  We discussed how he can adjust the dose of hydralazine, taking extra when blood pressure is higher.    He also reports acute onset of nasal congestion, rhinitis, clear nasal discharge, and scratchy sore throat three to four days ago.  The symptoms are mild.  He has experienced increased postnasal drip, right-sided ear pressure, scratchy throat, and clear nasal discharge.  Denies chills or fever.  Denies significant cough.   He has only been using his Flonase p.r.n. Exam today reveals mild posterior erythema at the uvula and clear postnasal drip as well as a small effusion noted on the right TM.  Overall, these recent symptoms seem to be consistent with a flare of seasonal allergy symptoms, perhaps aggravated by a virus.  He reports his symptoms are already improving today.    The skin on his bilateral hands appear very dry.  He uses alchohol based products to clean his hands at work.  I recommended equal parts of lotion and Auqaphor to help restore the oils stripped with the hand .     He is due for repeat colonoscopy.  He requests a referral back to Dr. Garcia.  An order is sent for him to schedule this today.     Review of Systems   Constitutional: Negative for chills, fatigue and fever.   HENT: Positive for congestion, ear pain,  "postnasal drip and sore throat. Negative for sinus pressure.    Respiratory: Negative for cough, shortness of breath and wheezing.    Cardiovascular: Negative for chest pain, palpitations and leg swelling.   Gastrointestinal: Negative for abdominal pain, blood in stool, constipation, diarrhea, nausea and vomiting.   Endocrine: Negative for cold intolerance, heat intolerance, polydipsia and polyuria.   Genitourinary: Negative for dysuria, frequency, hematuria and urgency.   Skin: Negative for rash.   Neurological: Negative for syncope and weakness.        Objective      Vitals:    06/18/18 0856   BP: 150/72   Pulse: 64   Temp: 97.9 °F (36.6 °C)   TempSrc: Oral   Weight: 101 kg (222 lb 9.6 oz)   Height: 185.4 cm (73\")       Physical Exam   Constitutional: He is oriented to person, place, and time. He appears well-developed and well-nourished. No distress.   HENT:   Head: Normocephalic and atraumatic.   Nose: Nose normal.   Mouth/Throat: Oropharynx is clear and moist. No oropharyngeal exudate.   Posterior erythema and clear postnasal drip.  Small effusion noted on the right.        Eyes: EOM are normal. Pupils are equal, round, and reactive to light.   Neck: Neck supple. No JVD present. No thyromegaly present.   Cardiovascular: Normal rate, regular rhythm and normal heart sounds.    Pulmonary/Chest: Effort normal and breath sounds normal. No accessory muscle usage. No respiratory distress. He has no wheezes. He has no rales.   Abdominal: Soft. Bowel sounds are normal. He exhibits no distension. There is no tenderness.   Musculoskeletal: He exhibits no edema.   Lymphadenopathy:     He has no cervical adenopathy.   Neurological: He is alert and oriented to person, place, and time. No cranial nerve deficit.   Psychiatric: He has a normal mood and affect. His speech is normal and behavior is normal. Judgment and thought content normal.       Future Appointments  Date Time Provider Department Center   11/28/2018 8:30 AM " Allen Hammonds MD [facility] None       Assessment/Plan      Continue the current doses of Lotrel, bisoprolol, and Diovan HCT.  Add hydralazine 25 mg to take 1/2-1 each morning and evening, taking an extra tablet if blood pressure is above goal.   Continue to monitor blood pressure at home and notify me if not at goal.  Decrease sodium in the diet.   If blood pressure is elevated before he is able to get his prescription of hydralazine, he can try taking 1/2 tablet of the clonidine twice daily to see if this causes less sedation.  He will stop the clonidine when the hydralazine arrives.     For the seasonal allergy symptoms, I recommended Flonase nasal spray to take one spray each nostril b.i.d. And daily antihistamine Claritin (loratadine) 10 mg one tablet daily.        Refer to Dr. Garcia for repeat colonoscopy.    For the dry skin, I recommended equal parts of moisturizing lotion and Auqaphor to help restore the oils stripped with the hand  he uses at work.     Return in November for routine follow up with fasting labs one week prior.     Scribed for Dr. Hammonds by Arlette Redd [facility].     Diagnoses and all orders for this visit:    Essential hypertension    Labile hypertension    Chronic seasonal allergic rhinitis due to pollen    Colon cancer screening  -     Ambulatory Referral to General Surgery    Dry skin dermatitis    Other orders  -     hydrALAZINE (APRESOLINE) 25 MG tablet; 1-2 tablets Bid for hypertension        No visits with results within 3 Week(s) from this visit.   Latest known visit with results is:   Office Visit on 01/29/2018   Component Date Value Ref Range Status   • Influenza A Ag, EIA 01/29/2018 Negative  Negative Final   • Influenza B Ag, EIA 01/29/2018 Negative  Negative Final   ]

## 2018-11-06 DIAGNOSIS — R93.89 ABNORMAL CHEST X-RAY: Primary | ICD-10-CM

## 2018-11-19 ENCOUNTER — LAB (OUTPATIENT)
Dept: LAB | Facility: OTHER | Age: 58
End: 2018-11-19

## 2018-11-19 DIAGNOSIS — Z12.5 SPECIAL SCREENING FOR MALIGNANT NEOPLASM OF PROSTATE: ICD-10-CM

## 2018-11-19 DIAGNOSIS — R73.01 IMPAIRED FASTING GLUCOSE: Chronic | ICD-10-CM

## 2018-11-19 DIAGNOSIS — I10 ESSENTIAL HYPERTENSION: Chronic | ICD-10-CM

## 2018-11-19 DIAGNOSIS — E78.2 MIXED HYPERLIPIDEMIA: Chronic | ICD-10-CM

## 2018-11-19 LAB
ALBUMIN SERPL-MCNC: 4.5 G/DL (ref 3.5–5)
ALBUMIN/GLOB SERPL: 2.1 G/DL (ref 1.1–1.8)
ALP SERPL-CCNC: 51 U/L (ref 38–126)
ALT SERPL W P-5'-P-CCNC: 28 U/L
ANION GAP SERPL CALCULATED.3IONS-SCNC: 5 MMOL/L (ref 5–15)
AST SERPL-CCNC: 29 U/L (ref 17–59)
BASOPHILS # BLD AUTO: 0.02 10*3/MM3 (ref 0–0.2)
BASOPHILS NFR BLD AUTO: 0.5 % (ref 0–2)
BILIRUB SERPL-MCNC: 1.2 MG/DL (ref 0.2–1.3)
BUN BLD-MCNC: 17 MG/DL (ref 9–20)
BUN/CREAT SERPL: 14.8 (ref 7–25)
CALCIUM SPEC-SCNC: 9.8 MG/DL (ref 8.4–10.2)
CHLORIDE SERPL-SCNC: 105 MMOL/L (ref 98–107)
CHOLEST SERPL-MCNC: 129 MG/DL (ref 150–200)
CO2 SERPL-SCNC: 32 MMOL/L (ref 22–30)
CREAT BLD-MCNC: 1.15 MG/DL (ref 0.66–1.25)
DEPRECATED RDW RBC AUTO: 45.2 FL (ref 35.1–43.9)
EOSINOPHIL # BLD AUTO: 0.1 10*3/MM3 (ref 0–0.7)
EOSINOPHIL NFR BLD AUTO: 2.3 % (ref 0–7)
ERYTHROCYTE [DISTWIDTH] IN BLOOD BY AUTOMATED COUNT: 13.6 % (ref 11.5–14.5)
GFR SERPL CREATININE-BSD FRML MDRD: 65 ML/MIN/1.73 (ref 49–113)
GLOBULIN UR ELPH-MCNC: 2.1 GM/DL (ref 2.3–3.5)
GLUCOSE BLD-MCNC: 114 MG/DL (ref 74–99)
HBA1C MFR BLD: 5.4 % (ref 4–5.6)
HCT VFR BLD AUTO: 42.3 % (ref 39–49)
HDLC SERPL-MCNC: 32 MG/DL (ref 40–59)
HGB BLD-MCNC: 13.8 G/DL (ref 13.7–17.3)
LDLC SERPL CALC-MCNC: 84 MG/DL
LDLC/HDLC SERPL: 2.63 {RATIO} (ref 0–3.55)
LYMPHOCYTES # BLD AUTO: 0.88 10*3/MM3 (ref 0.6–4.2)
LYMPHOCYTES NFR BLD AUTO: 20.2 % (ref 10–50)
MCH RBC QN AUTO: 30.3 PG (ref 26.5–34)
MCHC RBC AUTO-ENTMCNC: 32.6 G/DL (ref 31.5–36.3)
MCV RBC AUTO: 92.8 FL (ref 80–98)
MONOCYTES # BLD AUTO: 0.41 10*3/MM3 (ref 0–0.9)
MONOCYTES NFR BLD AUTO: 9.4 % (ref 0–12)
NEUTROPHILS # BLD AUTO: 2.95 10*3/MM3 (ref 2–8.6)
NEUTROPHILS NFR BLD AUTO: 67.6 % (ref 37–80)
PLATELET # BLD AUTO: 169 10*3/MM3 (ref 150–450)
PMV BLD AUTO: 10.1 FL (ref 8–12)
POTASSIUM BLD-SCNC: 4.1 MMOL/L (ref 3.4–5)
PROT SERPL-MCNC: 6.6 G/DL (ref 6.3–8.2)
PSA SERPL-MCNC: 3.32 NG/ML (ref 0–4)
RBC # BLD AUTO: 4.56 10*6/MM3 (ref 4.37–5.74)
SODIUM BLD-SCNC: 142 MMOL/L (ref 137–145)
TRIGL SERPL-MCNC: 65 MG/DL
TSH SERPL DL<=0.05 MIU/L-ACNC: 1.4 MIU/ML (ref 0.46–4.68)
VLDLC SERPL-MCNC: 13 MG/DL
WBC NRBC COR # BLD: 4.36 10*3/MM3 (ref 3.2–9.8)

## 2018-11-19 PROCEDURE — 83036 HEMOGLOBIN GLYCOSYLATED A1C: CPT | Performed by: INTERNAL MEDICINE

## 2018-11-19 PROCEDURE — 80053 COMPREHEN METABOLIC PANEL: CPT | Performed by: INTERNAL MEDICINE

## 2018-11-19 PROCEDURE — 80061 LIPID PANEL: CPT | Performed by: INTERNAL MEDICINE

## 2018-11-19 PROCEDURE — G0103 PSA SCREENING: HCPCS | Performed by: INTERNAL MEDICINE

## 2018-11-19 PROCEDURE — 84443 ASSAY THYROID STIM HORMONE: CPT | Performed by: INTERNAL MEDICINE

## 2018-11-19 PROCEDURE — 36415 COLL VENOUS BLD VENIPUNCTURE: CPT | Performed by: INTERNAL MEDICINE

## 2018-11-19 PROCEDURE — 85025 COMPLETE CBC W/AUTO DIFF WBC: CPT | Performed by: INTERNAL MEDICINE

## 2018-11-28 ENCOUNTER — OFFICE VISIT (OUTPATIENT)
Dept: FAMILY MEDICINE CLINIC | Facility: CLINIC | Age: 58
End: 2018-11-28

## 2018-11-28 VITALS
WEIGHT: 220 LBS | SYSTOLIC BLOOD PRESSURE: 130 MMHG | HEART RATE: 64 BPM | DIASTOLIC BLOOD PRESSURE: 80 MMHG | BODY MASS INDEX: 29.16 KG/M2 | HEIGHT: 73 IN | TEMPERATURE: 97.8 F

## 2018-11-28 DIAGNOSIS — K21.9 GASTROESOPHAGEAL REFLUX DISEASE WITHOUT ESOPHAGITIS: Chronic | ICD-10-CM

## 2018-11-28 DIAGNOSIS — I10 ESSENTIAL HYPERTENSION: Primary | Chronic | ICD-10-CM

## 2018-11-28 DIAGNOSIS — N52.9 MALE ERECTILE DISORDER: Chronic | ICD-10-CM

## 2018-11-28 DIAGNOSIS — R73.01 IMPAIRED FASTING GLUCOSE: Chronic | ICD-10-CM

## 2018-11-28 DIAGNOSIS — E78.2 MIXED HYPERLIPIDEMIA: Chronic | ICD-10-CM

## 2018-11-28 DIAGNOSIS — E78.1 HYPERTRIGLYCERIDEMIA: Chronic | ICD-10-CM

## 2018-11-28 DIAGNOSIS — J30.1 SEASONAL ALLERGIC RHINITIS DUE TO POLLEN: Chronic | ICD-10-CM

## 2018-11-28 PROCEDURE — 99214 OFFICE O/P EST MOD 30 MIN: CPT | Performed by: INTERNAL MEDICINE

## 2018-11-28 RX ORDER — HYDRALAZINE HYDROCHLORIDE 25 MG/1
TABLET, FILM COATED ORAL
Qty: 360 TABLET | Refills: 3 | Status: SHIPPED | OUTPATIENT
Start: 2018-11-28 | End: 2019-12-02 | Stop reason: SDUPTHER

## 2018-11-28 RX ORDER — BISOPROLOL FUMARATE 5 MG/1
TABLET, FILM COATED ORAL
Qty: 45 TABLET | Refills: 3 | Status: SHIPPED | OUTPATIENT
Start: 2018-11-28 | End: 2020-02-05

## 2018-11-28 RX ORDER — VALSARTAN AND HYDROCHLOROTHIAZIDE 320; 25 MG/1; MG/1
1 TABLET, FILM COATED ORAL DAILY
Qty: 90 TABLET | Refills: 3 | Status: SHIPPED | OUTPATIENT
Start: 2018-11-28 | End: 2019-12-02 | Stop reason: SDUPTHER

## 2018-11-28 RX ORDER — AMLODIPINE BESYLATE AND BENAZEPRIL HYDROCHLORIDE 5; 20 MG/1; MG/1
CAPSULE ORAL
Qty: 90 CAPSULE | Refills: 3 | Status: SHIPPED | OUTPATIENT
Start: 2018-11-28 | End: 2019-12-02 | Stop reason: SDUPTHER

## 2018-11-28 RX ORDER — OMEPRAZOLE 40 MG/1
40 CAPSULE, DELAYED RELEASE ORAL DAILY
Qty: 90 CAPSULE | Refills: 3 | Status: SHIPPED | OUTPATIENT
Start: 2018-11-28 | End: 2019-12-02 | Stop reason: SDUPTHER

## 2018-11-28 NOTE — PROGRESS NOTES
Subjective        History of Present Illness     Rashaun Quintanilla is a 58 y.o. male who presents for annual follow up on hypertension, dyslipidemia, impaired fasting glucose, and GERD.  Rashaun has a history of tortuous/ectatic thoracic aorta and has had serial chest x-rays on an annual basis for several years, but we discontinued the annual evaluation to decrease radiation exposure.  Thoracic aorta x-ray 11/2016 revealed stability and the radiologist did not feel that the aorta was abnormal.  GERD symptoms adequately controlled with episodic use of Prilosec.  He reports occasional chest pain, but denies chest pain with exertion.  He addressed this with Dr. Marino in the past.  We discussed use of OTC antacids for quick relief of any flares of GERD symptoms.  He reports nocturia 0-4 depending on his liquid intake late at night.      Labs reveal persistent impaired fasting glucose.  We discussed diabetic diet principles to help delay progression including decreasing sugar .     He had colonoscopy by Dr. Garcia on 07/30/2018.   We will request results from [facility].     Weight is up 3 pounds in the past year.      He had influenza vaccine at his place of employment on 10/18/18.      He was seen in the office this summer summer reporting blood pressure above goal.  I added hydralazine 25 mg to take 1/2-1 each morning and evening, taking an extra tablet if blood pressure is above goal to his regimen including Lotrel, bisoprolol, and Diovan HCT.  Blood pressure at goal today at 130/80.  He brings in a blood pressure diary   He is tolerating the hydralazine with only occasional dry mouth.  Denies constipation or orthostatic symptoms. He tries to make slow position changes with his multiple antihypertensive medications.      The patient's relevant past medical, surgical, and social history was reviewed in Epic.   Lab results are reviewed with the patient today.  CBC unremarkable.  Fasting glucose 114. A1c is 5.4.  Total  "cholesterol 129 with diet.  HDL 32.  LDL 84. Triglycerides 65.  LDL/HDL ratio improved at 2.63 due to improvement in LDL.  PSA normal.  Liver and renal function normal.       Review of Systems   Constitutional: Negative for chills, fatigue and fever.   HENT: Negative for congestion, ear pain, postnasal drip, sinus pressure and sore throat.    Respiratory: Negative for cough, shortness of breath and wheezing.    Cardiovascular: Negative for chest pain, palpitations and leg swelling.   Gastrointestinal: Negative for abdominal pain, blood in stool, constipation, diarrhea, nausea and vomiting.   Endocrine: Negative for cold intolerance, heat intolerance, polydipsia and polyuria.   Genitourinary: Negative for dysuria, frequency, hematuria and urgency.   Skin: Negative for rash.   Neurological: Negative for syncope and weakness.        Objective     Vitals:    11/28/18 0852   BP: 130/80   Pulse: 64   Temp: 97.8 °F (36.6 °C)   TempSrc: Oral   Weight: 99.8 kg (220 lb)   Height: 185.4 cm (73\")     Physical Exam   Constitutional: He is oriented to person, place, and time. He appears well-developed and well-nourished. No distress.   HENT:   Head: Normocephalic and atraumatic.   Nose: Right sinus exhibits no maxillary sinus tenderness and no frontal sinus tenderness. Left sinus exhibits no maxillary sinus tenderness and no frontal sinus tenderness.   Mouth/Throat: Uvula is midline, oropharynx is clear and moist and mucous membranes are normal. No oral lesions. No tonsillar exudate.   Bilatearl nasal congestion and clear postnasal drip.    Eyes: Conjunctivae and EOM are normal. Pupils are equal, round, and reactive to light.   Neck: Trachea normal. Neck supple. No JVD present. Carotid bruit is not present. No tracheal deviation present. No thyroid mass and no thyromegaly present.   Cardiovascular: Normal rate, regular rhythm, normal heart sounds and intact distal pulses.  No extrasystoles are present. PMI is not displaced.   No " murmur heard.  Pulmonary/Chest: Effort normal and breath sounds normal. No accessory muscle usage. No respiratory distress. He has no decreased breath sounds. He has no wheezes. He has no rhonchi. He has no rales.   Abdominal: Soft. Bowel sounds are normal. He exhibits no distension. There is no hepatosplenomegaly. There is no tenderness.     Vascular Status -  His right foot exhibits normal foot vasculature  and no edema. His left foot exhibits normal foot vasculature  and no edema.  Lymphadenopathy:     He has no cervical adenopathy.   Neurological: He is alert and oriented to person, place, and time. No cranial nerve deficit. Coordination normal.   Skin: Skin is warm, dry and intact. No rash noted. No cyanosis. Nails show no clubbing.   Psychiatric: He has a normal mood and affect. His speech is normal and behavior is normal. Judgment and thought content normal.   Vitals reviewed.      Assessment/Plan      Continue the current antihypertensive regimen.  Monitor blood pressure and notify me if consistently above goal.     Recommended he decrease sugar and carbohydrates in the diet to help delay progression of impaired fasting glucose.      We will request colonoscopy results dated 07/30/2018 by Dr. Garcia from [facility].      Continue episodic use of Prilosec.  He can add OTC antacids for quick relief of flares.      Continue other prescription medications and vitamin and mineral supplements to treat additional medical problems which we addressed today.  Return in one year for annual exam with fasting labs one week prior.       Scribed for Dr. Hammonds by [facility].     Diagnoses and all orders for this visit:    Essential hypertension    Seasonal allergic rhinitis due to pollen    Mixed hyperlipidemia    Hypertriglyceridemia    Gastroesophageal reflux disease without esophagitis    Impaired fasting glucose    Male erectile disorder    Other orders  -     amLODIPine-benazepril (LOTREL 5-20) 5-20 MG per  capsule; Take one capsule daily  -     bisoprolol (ZEBeta) 5 MG tablet; Take 1/2 tablet daily  -     hydrALAZINE (APRESOLINE) 25 MG tablet; 1-2 tablets Bid for hypertension  -     omeprazole (priLOSEC) 40 MG capsule; Take 1 capsule by mouth Daily.  -     valsartan-hydrochlorothiazide (DIOVAN-HCT) 320-25 MG per tablet; Take 1 tablet by mouth Daily.        Lab on 11/19/2018   Component Date Value Ref Range Status   • WBC 11/19/2018 4.36  3.20 - 9.80 10*3/mm3 Final   • RBC 11/19/2018 4.56  4.37 - 5.74 10*6/mm3 Final   • Hemoglobin 11/19/2018 13.8  13.7 - 17.3 g/dL Final   • Hematocrit 11/19/2018 42.3  39.0 - 49.0 % Final   • MCV 11/19/2018 92.8  80.0 - 98.0 fL Final   • MCH 11/19/2018 30.3  26.5 - 34.0 pg Final   • MCHC 11/19/2018 32.6  31.5 - 36.3 g/dL Final   • RDW 11/19/2018 13.6  11.5 - 14.5 % Final   • RDW-SD 11/19/2018 45.2* 35.1 - 43.9 fl Final   • MPV 11/19/2018 10.1  8.0 - 12.0 fL Final   • Platelets 11/19/2018 169  150 - 450 10*3/mm3 Final   • Neutrophil % 11/19/2018 67.6  37.0 - 80.0 % Final   • Lymphocyte % 11/19/2018 20.2  10.0 - 50.0 % Final   • Monocyte % 11/19/2018 9.4  0.0 - 12.0 % Final   • Eosinophil % 11/19/2018 2.3  0.0 - 7.0 % Final   • Basophil % 11/19/2018 0.5  0.0 - 2.0 % Final   • Neutrophils, Absolute 11/19/2018 2.95  2.00 - 8.60 10*3/mm3 Final   • Lymphocytes, Absolute 11/19/2018 0.88  0.60 - 4.20 10*3/mm3 Final   • Monocytes, Absolute 11/19/2018 0.41  0.00 - 0.90 10*3/mm3 Final   • Eosinophils, Absolute 11/19/2018 0.10  0.00 - 0.70 10*3/mm3 Final   • Basophils, Absolute 11/19/2018 0.02  0.00 - 0.20 10*3/mm3 Final   • Glucose 11/19/2018 114* 74 - 99 mg/dL Final   • BUN 11/19/2018 17  9 - 20 mg/dL Final   • Creatinine 11/19/2018 1.15  0.66 - 1.25 mg/dL Final   • Sodium 11/19/2018 142  137 - 145 mmol/L Final   • Potassium 11/19/2018 4.1  3.4 - 5.0 mmol/L Final   • Chloride 11/19/2018 105  98 - 107 mmol/L Final   • CO2 11/19/2018 32.0* 22.0 - 30.0 mmol/L Final   • Calcium 11/19/2018 9.8  8.4 -  10.2 mg/dL Final   • Total Protein 11/19/2018 6.6  6.3 - 8.2 g/dL Final   • Albumin 11/19/2018 4.50  3.50 - 5.00 g/dL Final   • ALT (SGPT) 11/19/2018 28  <=50 U/L Final   • AST (SGOT) 11/19/2018 29  17 - 59 U/L Final   • Alkaline Phosphatase 11/19/2018 51  38 - 126 U/L Final   • Total Bilirubin 11/19/2018 1.2  0.2 - 1.3 mg/dL Final   • eGFR Non  Amer 11/19/2018 65  49 - 113 mL/min/1.73 Final   • Globulin 11/19/2018 2.1* 2.3 - 3.5 gm/dL Final   • A/G Ratio 11/19/2018 2.1* 1.1 - 1.8 g/dL Final   • BUN/Creatinine Ratio 11/19/2018 14.8  7.0 - 25.0 Final   • Anion Gap 11/19/2018 5.0  5.0 - 15.0 mmol/L Final   • Hemoglobin A1C 11/19/2018 5.4  4 - 5.6 % Final   • Total Cholesterol 11/19/2018 129* 150 - 200 mg/dL Final   • Triglycerides 11/19/2018 65  <=150 mg/dL Final   • HDL Cholesterol 11/19/2018 32* 40 - 59 mg/dL Final   • LDL Cholesterol  11/19/2018 84  <=100 mg/dL Final   • VLDL Cholesterol 11/19/2018 13  mg/dL Final   • LDL/HDL Ratio 11/19/2018 2.63  0.00 - 3.55 Final   • PSA 11/19/2018 3.320  0.000 - 4.000 ng/mL Final   • TSH 11/19/2018 1.400  0.460 - 4.680 mIU/mL Final   ]

## 2018-11-28 NOTE — PATIENT INSTRUCTIONS
Exercising to Lose Weight  Exercising can help you to lose weight. In order to lose weight through exercise, you need to do vigorous-intensity exercise. You can tell that you are exercising with vigorous intensity if you are breathing very hard and fast and cannot hold a conversation while exercising.  Moderate-intensity exercise helps to maintain your current weight. You can tell that you are exercising at a moderate level if you have a higher heart rate and faster breathing, but you are still able to hold a conversation.  How often should I exercise?  Choose an activity that you enjoy and set realistic goals. Your health care provider can help you to make an activity plan that works for you. Exercise regularly as directed by your health care provider. This may include:  · Doing resistance training twice each week, such as:  ? Push-ups.  ? Sit-ups.  ? Lifting weights.  ? Using resistance bands.  · Doing a given intensity of exercise for a given amount of time. Choose from these options:  ? 150 minutes of moderate-intensity exercise every week.  ? 75 minutes of vigorous-intensity exercise every week.  ? A mix of moderate-intensity and vigorous-intensity exercise every week.    Children, pregnant women, people who are out of shape, people who are overweight, and older adults may need to consult a health care provider for individual recommendations. If you have any sort of medical condition, be sure to consult your health care provider before starting a new exercise program.  What are some activities that can help me to lose weight?  · Walking at a rate of at least 4.5 miles an hour.  · Jogging or running at a rate of 5 miles per hour.  · Biking at a rate of at least 10 miles per hour.  · Lap swimming.  · Roller-skating or in-line skating.  · Cross-country skiing.  · Vigorous competitive sports, such as football, basketball, and soccer.  · Jumping rope.  · Aerobic dancing.  How can I be more active in my day-to-day  activities?  · Use the stairs instead of the elevator.  · Take a walk during your lunch break.  · If you drive, park your car farther away from work or school.  · If you take public transportation, get off one stop early and walk the rest of the way.  · Make all of your phone calls while standing up and walking around.  · Get up, stretch, and walk around every 30 minutes throughout the day.  What guidelines should I follow while exercising?  · Do not exercise so much that you hurt yourself, feel dizzy, or get very short of breath.  · Consult your health care provider prior to starting a new exercise program.  · Wear comfortable clothes and shoes with good support.  · Drink plenty of water while you exercise to prevent dehydration or heat stroke. Body water is lost during exercise and must be replaced.  · Work out until you breathe faster and your heart beats faster.  This information is not intended to replace advice given to you by your health care provider. Make sure you discuss any questions you have with your health care provider.  Document Released: 01/20/2012 Document Revised: 05/25/2017 Document Reviewed: 05/21/2015  [employer] Patient Education © 2018 [employer]      Carbohydrate Counting for Diabetes Mellitus, Adult  Carbohydrate counting is a method for keeping track of how many carbohydrates you eat. Eating carbohydrates naturally increases the amount of sugar (glucose) in the blood. Counting how many carbohydrates you eat helps keep your blood glucose within normal limits, which helps you manage your diabetes (diabetes mellitus).  It is important to know how many carbohydrates you can safely have in each meal. This is different for every person. A diet and nutrition specialist (registered dietitian) can help you make a meal plan and calculate how many carbohydrates you should have at each meal and snack.  Carbohydrates are found in the following foods:  · Grains, such as breads and  "cereals.  · Dried beans and soy products.  · Starchy vegetables, such as potatoes, peas, and corn.  · Fruit and fruit juices.  · Milk and yogurt.  · Sweets and snack foods, such as cake, cookies, candy, chips, and soft drinks.    How do I count carbohydrates?  There are two ways to count carbohydrates in food. You can use either of the methods or a combination of both.  Reading \"Nutrition Facts\" on packaged food  The \"Nutrition Facts\" list is included on the labels of almost all packaged foods and beverages in the [place] It includes:  · The serving size.  · Information about nutrients in each serving, including the grams (g) of carbohydrate per serving.    To use the “Nutrition Facts\":  · Decide how many servings you will have.  · Multiply the number of servings by the number of carbohydrates per serving.  · The resulting number is the total amount of carbohydrates that you will be having.    Learning standard serving sizes of other foods  When you eat foods containing carbohydrates that are not packaged or do not include \"Nutrition Facts\" on the label, you need to measure the servings in order to count the amount of carbohydrates:  · Measure the foods that you will eat with a food scale or measuring cup, if needed.  · Decide how many standard-size servings you will eat.  · Multiply the number of servings by 15. Most carbohydrate-rich foods have about 15 g of carbohydrates per serving.  ? For example, if you eat 8 oz (170 g) of strawberries, you will have eaten 2 servings and 30 g of carbohydrates (2 servings x 15 g = 30 g).  · For foods that have more than one food mixed, such as soups and casseroles, you must count the carbohydrates in each food that is included.    The following list contains standard serving sizes of common carbohydrate-rich foods. Each of these servings has about 15 g of carbohydrates:  · ½ hamburger bun or ½ English muffin.  · ½ oz (15 mL) syrup.  · ½ oz (14 g) jelly.  · 1 slice of bread.  · 1 " six-inch tortilla.  · 3 oz (85 g) cooked rice or pasta.  · 4 oz (113 g) cooked dried beans.  · 4 oz (113 g) starchy vegetable, such as peas, corn, or potatoes.  · 4 oz (113 g) hot cereal.  · 4 oz (113 g) mashed potatoes or ¼ of a large baked potato.  · 4 oz (113 g) canned or frozen fruit.  · 4 oz (120 mL) fruit juice.  · 4-6 crackers.  · 6 chicken nuggets.  · 6 oz (170 g) unsweetened dry cereal.  · 6 oz (170 g) plain fat-free yogurt or yogurt sweetened with artificial sweeteners.  · 8 oz (240 mL) milk.  · 8 oz (170 g) fresh fruit or one small piece of fruit.  · 24 oz (680 g) popped popcorn.    Example of carbohydrate counting  Sample meal  · 3 oz (85 g) chicken breast.  · 6 oz (170 g) brown rice.  · 4 oz (113 g) corn.  · 8 oz (240 mL) milk.  · 8 oz (170 g) strawberries with sugar-free whipped topping.  Carbohydrate calculation  1. Identify the foods that contain carbohydrates:  ? Rice.  ? Corn.  ? Milk.  ? Strawberries.  2. Calculate how many servings you have of each food:  ? 2 servings rice.  ? 1 serving corn.  ? 1 serving milk.  ? 1 serving strawberries.  3. Multiply each number of servings by 15 g:  ? 2 servings rice x 15 g = 30 g.  ? 1 serving corn x 15 g = 15 g.  ? 1 serving milk x 15 g = 15 g.  ? 1 serving strawberries x 15 g = 15 g.  4. Add together all of the amounts to find the total grams of carbohydrates eaten:  ? 30 g + 15 g + 15 g + 15 g = 75 g of carbohydrates total.  This information is not intended to replace advice given to you by your health care provider. Make sure you discuss any questions you have with your health care provider.  Document Released: 12/18/2006 Document Revised: 07/07/2017 Document Reviewed: 05/31/2017  [employer] Interactive Patient Education © 2018 [employer]

## 2019-01-15 DIAGNOSIS — I10 ESSENTIAL HYPERTENSION: Primary | Chronic | ICD-10-CM

## 2019-01-15 DIAGNOSIS — E78.2 MIXED HYPERLIPIDEMIA: Chronic | ICD-10-CM

## 2019-01-15 DIAGNOSIS — Z12.5 SCREENING FOR MALIGNANT NEOPLASM OF PROSTATE: ICD-10-CM

## 2019-01-15 DIAGNOSIS — R73.01 IMPAIRED FASTING GLUCOSE: Chronic | ICD-10-CM

## 2019-03-20 ENCOUNTER — OFFICE VISIT (OUTPATIENT)
Dept: FAMILY MEDICINE CLINIC | Facility: CLINIC | Age: 59
End: 2019-03-20

## 2019-03-20 VITALS
HEART RATE: 66 BPM | SYSTOLIC BLOOD PRESSURE: 138 MMHG | BODY MASS INDEX: 29.42 KG/M2 | OXYGEN SATURATION: 95 % | DIASTOLIC BLOOD PRESSURE: 80 MMHG | WEIGHT: 222 LBS | TEMPERATURE: 97.8 F | HEIGHT: 73 IN

## 2019-03-20 DIAGNOSIS — J45.21 MILD INTERMITTENT ASTHMA WITH ACUTE EXACERBATION: Primary | ICD-10-CM

## 2019-03-20 DIAGNOSIS — J06.9 VIRAL URI WITH COUGH: ICD-10-CM

## 2019-03-20 DIAGNOSIS — J30.1 SEASONAL ALLERGIC RHINITIS DUE TO POLLEN: ICD-10-CM

## 2019-03-20 PROCEDURE — 96372 THER/PROPH/DIAG INJ SC/IM: CPT | Performed by: INTERNAL MEDICINE

## 2019-03-20 PROCEDURE — 99214 OFFICE O/P EST MOD 30 MIN: CPT | Performed by: INTERNAL MEDICINE

## 2019-03-20 RX ORDER — MONTELUKAST SODIUM 10 MG/1
10 TABLET ORAL NIGHTLY
Qty: 30 TABLET | Refills: 1 | Status: SHIPPED | OUTPATIENT
Start: 2019-03-20 | End: 2019-07-11

## 2019-03-20 RX ORDER — PHENYLEPHRINE HCL 10 MG/1
TABLET, FILM COATED ORAL
Qty: 36 TABLET | Refills: 0 | Status: SHIPPED | OUTPATIENT
Start: 2019-03-20 | End: 2019-07-11

## 2019-03-20 RX ORDER — METHYLPREDNISOLONE ACETATE 80 MG/ML
80 INJECTION, SUSPENSION INTRA-ARTICULAR; INTRALESIONAL; INTRAMUSCULAR; SOFT TISSUE ONCE
Status: COMPLETED | OUTPATIENT
Start: 2019-03-20 | End: 2019-03-20

## 2019-03-20 RX ORDER — TRIAMCINOLONE ACETONIDE 40 MG/ML
20 INJECTION, SUSPENSION INTRA-ARTICULAR; INTRAMUSCULAR ONCE
Status: COMPLETED | OUTPATIENT
Start: 2019-03-20 | End: 2019-03-20

## 2019-03-20 RX ORDER — BENZONATATE 200 MG/1
200 CAPSULE ORAL 3 TIMES DAILY PRN
Qty: 30 CAPSULE | Refills: 0 | Status: SHIPPED | OUTPATIENT
Start: 2019-03-20 | End: 2019-07-11

## 2019-03-20 RX ADMIN — METHYLPREDNISOLONE ACETATE 80 MG: 80 INJECTION, SUSPENSION INTRA-ARTICULAR; INTRALESIONAL; INTRAMUSCULAR; SOFT TISSUE at 15:15

## 2019-03-20 RX ADMIN — TRIAMCINOLONE ACETONIDE 20 MG: 40 INJECTION, SUSPENSION INTRA-ARTICULAR; INTRAMUSCULAR at 15:15

## 2019-03-20 NOTE — PROGRESS NOTES
"Subjective        History of Present Illness     Rashaun Quintanilla is a 59 y.o. male who comes in with 1-month history of intermittent scratchy throat, nasal congestion, postnasal drip and episodic nonproductive cough.  His symptoms started with what sounds like a viral upper respiratory infeciton.  He denies flulike symptoms.  Denies wheezing, although, he does have expiratory wheezes heard on exam . Denies fever or chills.  Denies ear pain.  No sick contacts.  He is also describing some spring allergy symptoms including rhinitis.  He is not currently taking an antihistamine or using his steroid nasal spray.    Review of Systems   Constitutional: Negative for chills, fatigue and fever.   HENT: Positive for congestion, postnasal drip and sore throat. Negative for ear pain and sinus pressure.    Respiratory: Positive for cough. Negative for wheezing.    Cardiovascular: Negative for chest pain, palpitations and leg swelling.   Gastrointestinal: Negative for abdominal pain, blood in stool, constipation, diarrhea, nausea and vomiting.   Endocrine: Negative for cold intolerance, heat intolerance, polydipsia and polyuria.   Genitourinary: Negative for dysuria, frequency, hematuria and urgency.   Skin: Negative for rash.   Neurological: Negative for syncope and weakness.        Objective     Vitals:    03/20/19 1419   BP: 138/80   Pulse: 66   Temp: 97.8 °F (36.6 °C)   TempSrc: Oral   SpO2: 95%   Weight: 101 kg (222 lb)   Height: 185.4 cm (73\")     Physical Exam   Constitutional: He is oriented to person, place, and time. He appears well-developed and well-nourished. No distress.   HENT:   Head: Normocephalic and atraumatic.   Nose: Right sinus exhibits no maxillary sinus tenderness and no frontal sinus tenderness. Left sinus exhibits no maxillary sinus tenderness and no frontal sinus tenderness.   Mouth/Throat: Uvula is midline, oropharynx is clear and moist and mucous membranes are normal. No oral lesions. No tonsillar " exudate.   Uvula appears red with clear postnasal drip.  Severe nasal congestion.      Eyes: Conjunctivae and EOM are normal. Pupils are equal, round, and reactive to light.   Neck: Trachea normal. Neck supple. No JVD present. Carotid bruit is not present. No tracheal deviation present. No thyroid mass and no thyromegaly present.   Cardiovascular: Normal rate, regular rhythm, normal heart sounds and intact distal pulses.  No extrasystoles are present. PMI is not displaced.   No murmur heard.  Pulmonary/Chest: Effort normal and breath sounds normal. No accessory muscle usage. No respiratory distress. He has no decreased breath sounds. He has no wheezes. He has no rhonchi. He has no rales.   Expiratory wheezes.    Abdominal: Soft. Bowel sounds are normal. He exhibits no distension. There is no hepatosplenomegaly. There is no tenderness.     Vascular Status -  His right foot exhibits normal foot vasculature  and no edema. His left foot exhibits normal foot vasculature  and no edema.  Lymphadenopathy:     He has no cervical adenopathy.   Neurological: He is alert and oriented to person, place, and time. No cranial nerve deficit. Coordination normal.   Skin: Skin is warm, dry and intact. No rash noted. No cyanosis. Nails show no clubbing.   Psychiatric: He has a normal mood and affect. His speech is normal and behavior is normal. Judgment and thought content normal.   Vitals reviewed.    Future Appointments   Date Time Provider Department Center   12/2/2019  9:00 AM Allen Hammonds MD [facility] None         Assessment/Plan        For the new problems of viral URI with cough/mild asthma exacerbation and flare of seasonal allergies, I recommended he resume the Claritin one tablet daily and Flonase nasal spray one spray each nostril b.i.d.  A prescription is sent for Sudafed PE 10 mg to take 1 po 2-3 times daily prn congestion, Tessalon Perles 200 mg 1 capsule by mouth 3 (Three) Times a Day As Needed for Cough, and  Singulair 10 mg to take 1 tablet daily for 1 month.  A sample of Breo is given and demonstrated today in the office today to use one inhalation daily.  After informed verbal consent, patient is given Kenalog 20 mg and Depo-Medrol 80 mg IM without difficulty or complications.  Patient tolerated well without complications.        Notify us if he starts to develop symptoms of bacterial infection and we can add antibiotic therapy.        Return next December for routine follow up with fasting labs one week prior    Scribed for Dr. Hammonds by Arlette Redd [facility].     Diagnoses and all orders for this visit:    Mild intermittent asthma with acute exacerbation  -     methylPREDNISolone acetate (DEPO-medrol) injection 80 mg  -     triamcinolone acetonide (KENALOG-40) injection 20 mg    Viral URI with cough    Seasonal allergic rhinitis due to pollen  -     methylPREDNISolone acetate (DEPO-medrol) injection 80 mg  -     triamcinolone acetonide (KENALOG-40) injection 20 mg    Other orders  -     benzonatate (TESSALON) 200 MG capsule; Take 1 capsule by mouth 3 (Three) Times a Day As Needed for Cough for up to 30 doses.  -     montelukast (SINGULAIR) 10 MG tablet; Take 1 tablet by mouth Every Night.  -     phenylephrine (SUDAFED PE) 10 MG tablet; 1 po 2-3 times daily prn congestion        No visits with results within 3 Week(s) from this visit.   Latest known visit with results is:   Lab on 11/19/2018   Component Date Value Ref Range Status   • WBC 11/19/2018 4.36  3.20 - 9.80 10*3/mm3 Final   • RBC 11/19/2018 4.56  4.37 - 5.74 10*6/mm3 Final   • Hemoglobin 11/19/2018 13.8  13.7 - 17.3 g/dL Final   • Hematocrit 11/19/2018 42.3  39.0 - 49.0 % Final   • MCV 11/19/2018 92.8  80.0 - 98.0 fL Final   • MCH 11/19/2018 30.3  26.5 - 34.0 pg Final   • MCHC 11/19/2018 32.6  31.5 - 36.3 g/dL Final   • RDW 11/19/2018 13.6  11.5 - 14.5 % Final   • RDW-SD 11/19/2018 45.2* 35.1 - 43.9 fl Final   • MPV 11/19/2018 10.1  8.0 - 12.0 fL Final    • Platelets 11/19/2018 169  150 - 450 10*3/mm3 Final   • Neutrophil % 11/19/2018 67.6  37.0 - 80.0 % Final   • Lymphocyte % 11/19/2018 20.2  10.0 - 50.0 % Final   • Monocyte % 11/19/2018 9.4  0.0 - 12.0 % Final   • Eosinophil % 11/19/2018 2.3  0.0 - 7.0 % Final   • Basophil % 11/19/2018 0.5  0.0 - 2.0 % Final   • Neutrophils, Absolute 11/19/2018 2.95  2.00 - 8.60 10*3/mm3 Final   • Lymphocytes, Absolute 11/19/2018 0.88  0.60 - 4.20 10*3/mm3 Final   • Monocytes, Absolute 11/19/2018 0.41  0.00 - 0.90 10*3/mm3 Final   • Eosinophils, Absolute 11/19/2018 0.10  0.00 - 0.70 10*3/mm3 Final   • Basophils, Absolute 11/19/2018 0.02  0.00 - 0.20 10*3/mm3 Final   • Glucose 11/19/2018 114* 74 - 99 mg/dL Final   • BUN 11/19/2018 17  9 - 20 mg/dL Final   • Creatinine 11/19/2018 1.15  0.66 - 1.25 mg/dL Final   • Sodium 11/19/2018 142  137 - 145 mmol/L Final   • Potassium 11/19/2018 4.1  3.4 - 5.0 mmol/L Final   • Chloride 11/19/2018 105  98 - 107 mmol/L Final   • CO2 11/19/2018 32.0* 22.0 - 30.0 mmol/L Final   • Calcium 11/19/2018 9.8  8.4 - 10.2 mg/dL Final   • Total Protein 11/19/2018 6.6  6.3 - 8.2 g/dL Final   • Albumin 11/19/2018 4.50  3.50 - 5.00 g/dL Final   • ALT (SGPT) 11/19/2018 28  <=50 U/L Final   • AST (SGOT) 11/19/2018 29  17 - 59 U/L Final   • Alkaline Phosphatase 11/19/2018 51  38 - 126 U/L Final   • Total Bilirubin 11/19/2018 1.2  0.2 - 1.3 mg/dL Final   • eGFR Non  Amer 11/19/2018 65  49 - 113 mL/min/1.73 Final   • Globulin 11/19/2018 2.1* 2.3 - 3.5 gm/dL Final   • A/G Ratio 11/19/2018 2.1* 1.1 - 1.8 g/dL Final   • BUN/Creatinine Ratio 11/19/2018 14.8  7.0 - 25.0 Final   • Anion Gap 11/19/2018 5.0  5.0 - 15.0 mmol/L Final   • Hemoglobin A1C 11/19/2018 5.4  4 - 5.6 % Final   • Total Cholesterol 11/19/2018 129* 150 - 200 mg/dL Final   • Triglycerides 11/19/2018 65  <=150 mg/dL Final   • HDL Cholesterol 11/19/2018 32* 40 - 59 mg/dL Final   • LDL Cholesterol  11/19/2018 84  <=100 mg/dL Final   • VLDL  Cholesterol 11/19/2018 13  mg/dL Final   • LDL/HDL Ratio 11/19/2018 2.63  0.00 - 3.55 Final   • PSA 11/19/2018 3.320  0.000 - 4.000 ng/mL Final   • TSH 11/19/2018 1.400  0.460 - 4.680 mIU/mL Final   ]

## 2019-04-08 ENCOUNTER — LAB (OUTPATIENT)
Dept: LAB | Facility: OTHER | Age: 59
End: 2019-04-08

## 2019-04-08 ENCOUNTER — OFFICE VISIT (OUTPATIENT)
Dept: FAMILY MEDICINE CLINIC | Facility: CLINIC | Age: 59
End: 2019-04-08

## 2019-04-08 VITALS
HEIGHT: 73 IN | TEMPERATURE: 97.8 F | SYSTOLIC BLOOD PRESSURE: 136 MMHG | BODY MASS INDEX: 28.23 KG/M2 | WEIGHT: 213 LBS | HEART RATE: 68 BPM | DIASTOLIC BLOOD PRESSURE: 80 MMHG

## 2019-04-08 DIAGNOSIS — M17.11 PRIMARY OSTEOARTHRITIS OF RIGHT KNEE: Primary | ICD-10-CM

## 2019-04-08 DIAGNOSIS — M62.838 MUSCLE SPASM: ICD-10-CM

## 2019-04-08 DIAGNOSIS — K21.9 GASTROESOPHAGEAL REFLUX DISEASE WITHOUT ESOPHAGITIS: Chronic | ICD-10-CM

## 2019-04-08 DIAGNOSIS — I10 ESSENTIAL HYPERTENSION: Chronic | ICD-10-CM

## 2019-04-08 LAB
ANION GAP SERPL CALCULATED.3IONS-SCNC: 7 MMOL/L (ref 5–15)
BUN BLD-MCNC: 17 MG/DL (ref 7–23)
BUN/CREAT SERPL: 14.4 (ref 7–25)
CALCIUM SPEC-SCNC: 9.5 MG/DL (ref 8.4–10.2)
CHLORIDE SERPL-SCNC: 101 MMOL/L (ref 101–112)
CO2 SERPL-SCNC: 31 MMOL/L (ref 22–30)
CREAT BLD-MCNC: 1.18 MG/DL (ref 0.7–1.3)
FERRITIN SERPL-MCNC: 57.3 NG/ML (ref 30–400)
GFR SERPL CREATININE-BSD FRML MDRD: 63 ML/MIN/1.73 (ref 49–113)
GLUCOSE BLD-MCNC: 106 MG/DL (ref 70–99)
MAGNESIUM SERPL-MCNC: 2.2 MG/DL (ref 1.6–2.3)
POTASSIUM BLD-SCNC: 4 MMOL/L (ref 3.4–5)
SODIUM BLD-SCNC: 139 MMOL/L (ref 137–145)

## 2019-04-08 PROCEDURE — 83735 ASSAY OF MAGNESIUM: CPT | Performed by: INTERNAL MEDICINE

## 2019-04-08 PROCEDURE — 99214 OFFICE O/P EST MOD 30 MIN: CPT | Performed by: INTERNAL MEDICINE

## 2019-04-08 PROCEDURE — 80048 BASIC METABOLIC PNL TOTAL CA: CPT | Performed by: INTERNAL MEDICINE

## 2019-04-08 PROCEDURE — 36415 COLL VENOUS BLD VENIPUNCTURE: CPT | Performed by: INTERNAL MEDICINE

## 2019-04-08 PROCEDURE — 82728 ASSAY OF FERRITIN: CPT | Performed by: INTERNAL MEDICINE

## 2019-04-08 RX ORDER — MELOXICAM 15 MG/1
15 TABLET ORAL DAILY PRN
Qty: 90 TABLET | Refills: 3 | Status: SHIPPED | OUTPATIENT
Start: 2019-04-08 | End: 2020-12-15 | Stop reason: SDUPTHER

## 2019-04-08 RX ORDER — PRAMIPEXOLE DIHYDROCHLORIDE 0.25 MG/1
0.25 TABLET ORAL NIGHTLY
Qty: 30 TABLET | Refills: 0 | Status: SHIPPED | OUTPATIENT
Start: 2019-04-08 | End: 2019-05-30

## 2019-04-08 RX ORDER — PRAMIPEXOLE DIHYDROCHLORIDE 0.25 MG/1
0.25 TABLET ORAL NIGHTLY
Qty: 30 TABLET | Refills: 0 | Status: SHIPPED | OUTPATIENT
Start: 2019-04-08 | End: 2019-04-08 | Stop reason: SDUPTHER

## 2019-04-08 NOTE — PROGRESS NOTES
"Subjective        History of Present Illness     Rashaun Quintanilla is a 59 y.o. male who comes in today reporting a flare of right knee pain, which occurred two weeks ago after a busy day at work.  Denies any specific injury or trauma.  He has had good results with Mobic in the past, but his prescription is out of refills.  I recommended getting some x-rays of the knee today.       He also reports a flare of episisodic muscle spasms/cramps, especially in the bilateral hands and bilateral lower extremities and mostly occurring at night.  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  Denies soreness or tenderness to touch.         Review of Systems   Constitutional: Negative for chills, fatigue and fever.   HENT: Negative for congestion, ear pain, postnasal drip, sinus pressure and sore throat.    Respiratory: Negative for cough, shortness of breath and wheezing.    Cardiovascular: Negative for chest pain, palpitations and leg swelling.   Gastrointestinal: Negative for abdominal pain, blood in stool, constipation, diarrhea, nausea and vomiting.   Endocrine: Negative for cold intolerance, heat intolerance, polydipsia and polyuria.   Genitourinary: Negative for dysuria, frequency, hematuria and urgency.   Musculoskeletal: Positive for myalgias.        Knee pain.     Skin: Negative for rash.   Neurological: Negative for syncope and weakness.        Objective     Vitals:    04/08/19 0806   BP: 136/80   Pulse: 68   Temp: 97.8 °F (36.6 °C)   TempSrc: Oral   Weight: 96.6 kg (213 lb)   Height: 185.4 cm (73\")     Physical Exam   Constitutional: He is oriented to person, place, and time. He appears well-developed and well-nourished. No distress.   HENT:   Head: Normocephalic and atraumatic.   Nose: Nose normal.   Mouth/Throat: Oropharynx is clear and moist. No oropharyngeal exudate.   Eyes: EOM are normal. Pupils are " equal, round, and reactive to light.   Neck: Neck supple. No JVD present. No thyromegaly present.   Cardiovascular: Normal rate, regular rhythm and normal heart sounds.   Pulmonary/Chest: Effort normal and breath sounds normal. No accessory muscle usage. No respiratory distress. He has no wheezes. He has no rales.   Abdominal: Soft. Bowel sounds are normal. He exhibits no distension. There is no tenderness.   Musculoskeletal: He exhibits no edema.   Exam of right knee reveals crepitance.  Negative drawer sign.  Mild lateral and patellar laxity.  Negative quadriceps inhibition.     Lymphadenopathy:     He has no cervical adenopathy.   Neurological: He is alert and oriented to person, place, and time. No cranial nerve deficit.   Psychiatric: He has a normal mood and affect. His speech is normal and behavior is normal. Judgment and thought content normal.     Future Appointments   Date Time Provider Department Center   12/2/2019  9:00 AM Allen Hammonds MD [facility] None         Assessment/Plan      For the osteoarthritic right knee pain, he is sent for x-rays of right knee.  Recommended wearing supportive shoes with cushioned insoles and keeping weight at goal.  Demonstrated quad set exercises to complete in sets of 10 three times a day.  Refill is sent for Mobic 15 mg to take q.d. with food.  Continue the Prilosec. He can take naproxen sodium b.i.d. Until his 90-day mail order of Mobic arrives. If no improvement, we can refer to orthopedist or inject the knee.      We will check electrolytes for the muscle cramps.  He is fasting today and will stop by the lab for BMP, ferritin, and magnesium. Make sure he is hydrating well.  Do stretching exercises several times daily.  A prescription is sent for Mirapex 0.25 mg to take one tablet q.h.s. to see if this helps with the nighttime symptoms.     Return in December for routine follow up with fasting labs one week prior.     Scribed for Dr. Hammonds by Arlette Redd  [facility].     Diagnoses and all orders for this visit:    Primary osteoarthritis of right knee  -     XR Knee 1 or 2 View Right    Muscle spasm  -     Basic Metabolic Panel; Future  -     Magnesium; Future  -     Ferritin; Future    Essential hypertension  -     Basic Metabolic Panel; Future  -     Magnesium; Future  -     Ferritin; Future    Gastroesophageal reflux disease without esophagitis    Other orders  -     meloxicam (MOBIC) 15 MG tablet; Take 1 tablet by mouth Daily As Needed (Take with food.).  -     Discontinue: pramipexole (MIRAPEX) 0.25 MG tablet; Take 1 tablet by mouth Every Night.  -     pramipexole (MIRAPEX) 0.25 MG tablet; Take 1 tablet by mouth Every Night.        No visits with results within 3 Week(s) from this visit.   Latest known visit with results is:   Lab on 11/19/2018   Component Date Value Ref Range Status   • WBC 11/19/2018 4.36  3.20 - 9.80 10*3/mm3 Final   • RBC 11/19/2018 4.56  4.37 - 5.74 10*6/mm3 Final   • Hemoglobin 11/19/2018 13.8  13.7 - 17.3 g/dL Final   • Hematocrit 11/19/2018 42.3  39.0 - 49.0 % Final   • MCV 11/19/2018 92.8  80.0 - 98.0 fL Final   • MCH 11/19/2018 30.3  26.5 - 34.0 pg Final   • MCHC 11/19/2018 32.6  31.5 - 36.3 g/dL Final   • RDW 11/19/2018 13.6  11.5 - 14.5 % Final   • RDW-SD 11/19/2018 45.2* 35.1 - 43.9 fl Final   • MPV 11/19/2018 10.1  8.0 - 12.0 fL Final   • Platelets 11/19/2018 169  150 - 450 10*3/mm3 Final   • Neutrophil % 11/19/2018 67.6  37.0 - 80.0 % Final   • Lymphocyte % 11/19/2018 20.2  10.0 - 50.0 % Final   • Monocyte % 11/19/2018 9.4  0.0 - 12.0 % Final   • Eosinophil % 11/19/2018 2.3  0.0 - 7.0 % Final   • Basophil % 11/19/2018 0.5  0.0 - 2.0 % Final   • Neutrophils, Absolute 11/19/2018 2.95  2.00 - 8.60 10*3/mm3 Final   • Lymphocytes, Absolute 11/19/2018 0.88  0.60 - 4.20 10*3/mm3 Final   • Monocytes, Absolute 11/19/2018 0.41  0.00 - 0.90 10*3/mm3 Final   • Eosinophils, Absolute 11/19/2018 0.10  0.00 - 0.70 10*3/mm3 Final   • Basophils,  Absolute 11/19/2018 0.02  0.00 - 0.20 10*3/mm3 Final   • Glucose 11/19/2018 114* 74 - 99 mg/dL Final   • BUN 11/19/2018 17  9 - 20 mg/dL Final   • Creatinine 11/19/2018 1.15  0.66 - 1.25 mg/dL Final   • Sodium 11/19/2018 142  137 - 145 mmol/L Final   • Potassium 11/19/2018 4.1  3.4 - 5.0 mmol/L Final   • Chloride 11/19/2018 105  98 - 107 mmol/L Final   • CO2 11/19/2018 32.0* 22.0 - 30.0 mmol/L Final   • Calcium 11/19/2018 9.8  8.4 - 10.2 mg/dL Final   • Total Protein 11/19/2018 6.6  6.3 - 8.2 g/dL Final   • Albumin 11/19/2018 4.50  3.50 - 5.00 g/dL Final   • ALT (SGPT) 11/19/2018 28  <=50 U/L Final   • AST (SGOT) 11/19/2018 29  17 - 59 U/L Final   • Alkaline Phosphatase 11/19/2018 51  38 - 126 U/L Final   • Total Bilirubin 11/19/2018 1.2  0.2 - 1.3 mg/dL Final   • eGFR Non  Amer 11/19/2018 65  49 - 113 mL/min/1.73 Final   • Globulin 11/19/2018 2.1* 2.3 - 3.5 gm/dL Final   • A/G Ratio 11/19/2018 2.1* 1.1 - 1.8 g/dL Final   • BUN/Creatinine Ratio 11/19/2018 14.8  7.0 - 25.0 Final   • Anion Gap 11/19/2018 5.0  5.0 - 15.0 mmol/L Final   • Hemoglobin A1C 11/19/2018 5.4  4 - 5.6 % Final   • Total Cholesterol 11/19/2018 129* 150 - 200 mg/dL Final   • Triglycerides 11/19/2018 65  <=150 mg/dL Final   • HDL Cholesterol 11/19/2018 32* 40 - 59 mg/dL Final   • LDL Cholesterol  11/19/2018 84  <=100 mg/dL Final   • VLDL Cholesterol 11/19/2018 13  mg/dL Final   • LDL/HDL Ratio 11/19/2018 2.63  0.00 - 3.55 Final   • PSA 11/19/2018 3.320  0.000 - 4.000 ng/mL Final   • TSH 11/19/2018 1.400  0.460 - 4.680 mIU/mL Final   ]

## 2019-05-30 ENCOUNTER — OFFICE VISIT (OUTPATIENT)
Dept: FAMILY MEDICINE CLINIC | Facility: CLINIC | Age: 59
End: 2019-05-30

## 2019-05-30 VITALS
BODY MASS INDEX: 28.89 KG/M2 | HEIGHT: 73 IN | SYSTOLIC BLOOD PRESSURE: 140 MMHG | WEIGHT: 218 LBS | OXYGEN SATURATION: 98 % | TEMPERATURE: 98 F | DIASTOLIC BLOOD PRESSURE: 82 MMHG | HEART RATE: 66 BPM

## 2019-05-30 DIAGNOSIS — K21.9 GASTROESOPHAGEAL REFLUX DISEASE WITHOUT ESOPHAGITIS: Chronic | ICD-10-CM

## 2019-05-30 DIAGNOSIS — M17.11 PRIMARY OSTEOARTHRITIS OF RIGHT KNEE: Primary | ICD-10-CM

## 2019-05-30 DIAGNOSIS — M62.838 MUSCLE SPASM: ICD-10-CM

## 2019-05-30 DIAGNOSIS — M79.604 RIGHT LEG PAIN: ICD-10-CM

## 2019-05-30 DIAGNOSIS — I10 ESSENTIAL HYPERTENSION: Chronic | ICD-10-CM

## 2019-05-30 PROCEDURE — 99213 OFFICE O/P EST LOW 20 MIN: CPT | Performed by: INTERNAL MEDICINE

## 2019-05-30 NOTE — PROGRESS NOTES
Subjective     Rashaun Quintanilla is a 59 y.o. male.     History of Present Illness     Rashaun is here to discuss several issues.    He continues to have right knee pain with some episodic swelling.  We evaluated this recently with x-rays revealing mild arthritic/degenerative changes with some evidence of small effusion and patellar spurring.  He has not been taking the Mobic that we prescribed.  He has not been doing the quad sets and home exercise program we outlined.  The Mobic did not upset his reflux symptoms as long as he takes omeprazole episodically.  We had recommended more supportive shoes with a good quality insole, but he is wearing sandals today.  He reports that he has been wearing better shoes most days.  We discussed options.    His blood pressure control is marginal with multiple medications.  His weight is up 5 pounds in the past 6 weeks.  We have discussed the value of sodium restriction and weight loss and managing his hypertension.    He reports episodic headaches which is a new problem.  These are typically right-sided and can occur near the back of the head or on the side of the head.  They typically only last 5 or 6 seconds.  He just mentions this today because this is a new issue.  No vision changes or unilateral numbness or weakness.    Review of Systems   Constitutional: Negative for chills, fatigue and fever.   HENT: Negative for congestion, ear pain, postnasal drip, sinus pressure and sore throat.    Respiratory: Negative for cough, shortness of breath and wheezing.    Cardiovascular: Negative for chest pain, palpitations and leg swelling.   Gastrointestinal: Negative for abdominal pain, blood in stool, constipation, diarrhea, nausea and vomiting.   Endocrine: Negative for cold intolerance, heat intolerance, polydipsia and polyuria.   Genitourinary: Negative for dysuria, frequency, hematuria and urgency.   Musculoskeletal: Positive for myalgias.        Knee pain.     Skin: Negative for rash.  "  Neurological: Positive for headaches. Negative for syncope and weakness.       Objective     /82 (BP Location: Left arm, Patient Position: Sitting, Cuff Size: Adult)   Pulse 66   Temp 98 °F (36.7 °C) (Temporal)   Ht 185.4 cm (73\")   Wt 98.9 kg (218 lb)   SpO2 98%   BMI 28.76 kg/m²     Physical Exam   Constitutional: He is oriented to person, place, and time. He appears well-developed and well-nourished. No distress.   HENT:   Head: Normocephalic and atraumatic.   Nose: Nose normal.   Mouth/Throat: Oropharynx is clear and moist. No oropharyngeal exudate.   Eyes: EOM are normal. Pupils are equal, round, and reactive to light.   Neck: Neck supple. No JVD present. No thyromegaly present.   Cardiovascular: Normal rate, regular rhythm and normal heart sounds.   Pulmonary/Chest: Effort normal and breath sounds normal. No accessory muscle usage. No respiratory distress. He has no wheezes. He has no rales.   Abdominal: Soft. Bowel sounds are normal. He exhibits no distension. There is no tenderness.   Musculoskeletal: He exhibits no edema.   Exam of right knee reveals crepitance.  Small medial effusion, but no warmth or erythema.  Negative drawer sign.  Mild lateral and patellar laxity.  Mild quadriceps inhibition.     Lymphadenopathy:     He has no cervical adenopathy.   Neurological: He is alert and oriented to person, place, and time. No cranial nerve deficit.   Psychiatric: He has a normal mood and affect. His speech is normal and behavior is normal. Judgment and thought content normal.       PHQ-2/PHQ-9 Depression Screening 5/30/2019   Little interest or pleasure in doing things 0   Feeling down, depressed, or hopeless 0   Trouble falling or staying asleep, or sleeping too much -   Feeling tired or having little energy -   Poor appetite or overeating -   Feeling bad about yourself - or that you are a failure or have let yourself or your family down -   Trouble concentrating on things, such as reading the " newspaper or watching television -   Moving or speaking so slowly that other people could have noticed. Or the opposite - being so fidgety or restless that you have been moving around a lot more than usual -   Thoughts that you would be better off dead, or of hurting yourself in some way -   Total Score 0   If you checked off any problems, how difficult have these problems made it for you to do your work, take care of things at home, or get along with other people? -       Assessment/Plan     Resume Mobic 15 mg daily with food.  Take omeprazole daily while on the Mobic for upper GI protection.  Resume the home exercise program, especially quad sets.  Try some different OTC knee braces to see if any of those are helpful.  He can use ice packs on the knee when there is acute pain or swelling.  He is having some pain of the medial leg muscles just distal to the knee, which I believe is related to his knee pain.  The meloxicam will likely help with that as well.  Refer to Dr. Yoo for further evaluation and management.    I had given him some Mirapex to possibly help with nighttime muscle cramps/spasms, but it was not helpful.  He is not taking it.    Reassurance is given regarding these very brief, typically right-sided headaches.  Taking the meloxicam for a while will likely be helpful.  Notify me if this becomes a persistent issue.  Reassurance was given today.    Continue the current multidrug therapy for his hypertension.    Diagnoses and all orders for this visit:    Primary osteoarthritis of right knee  -     Ambulatory Referral to Orthopedic Surgery    Essential hypertension    Right leg pain  -     Ambulatory Referral to Orthopedic Surgery    Gastroesophageal reflux disease without esophagitis    Muscle spasm        No visits with results within 3 Week(s) from this visit.   Latest known visit with results is:   Lab on 04/08/2019   Component Date Value Ref Range Status   • Glucose 04/08/2019 106* 70 - 99 mg/dL  Final   • BUN 04/08/2019 17  7 - 23 mg/dL Final   • Creatinine 04/08/2019 1.18  0.70 - 1.30 mg/dL Final   • Sodium 04/08/2019 139  137 - 145 mmol/L Final   • Potassium 04/08/2019 4.0  3.4 - 5.0 mmol/L Final   • Chloride 04/08/2019 101  101 - 112 mmol/L Final   • CO2 04/08/2019 31.0* 22.0 - 30.0 mmol/L Final   • Calcium 04/08/2019 9.5  8.4 - 10.2 mg/dL Final   • eGFR Non African Amer 04/08/2019 63  49 - 113 mL/min/1.73 Final   • BUN/Creatinine Ratio 04/08/2019 14.4  7.0 - 25.0 Final   • Anion Gap 04/08/2019 7.0  5.0 - 15.0 mmol/L Final   • Magnesium 04/08/2019 2.2  1.6 - 2.3 mg/dL Final   • Ferritin 04/08/2019 57.30  30.00 - 400.00 ng/mL Final   ]

## 2019-07-11 ENCOUNTER — OFFICE VISIT (OUTPATIENT)
Dept: ORTHOPEDIC SURGERY | Facility: CLINIC | Age: 59
End: 2019-07-11

## 2019-07-11 VITALS — WEIGHT: 220 LBS | HEIGHT: 73 IN | BODY MASS INDEX: 29.16 KG/M2

## 2019-07-11 DIAGNOSIS — I10 ESSENTIAL HYPERTENSION: ICD-10-CM

## 2019-07-11 DIAGNOSIS — M25.561 ACUTE PAIN OF RIGHT KNEE: Primary | ICD-10-CM

## 2019-07-11 DIAGNOSIS — M21.42 PES PLANUS OF BOTH FEET: ICD-10-CM

## 2019-07-11 DIAGNOSIS — M21.41 PES PLANUS OF BOTH FEET: ICD-10-CM

## 2019-07-11 DIAGNOSIS — M23.91 INTERNAL DERANGEMENT OF RIGHT KNEE: ICD-10-CM

## 2019-07-11 PROCEDURE — 99203 OFFICE O/P NEW LOW 30 MIN: CPT | Performed by: ORTHOPAEDIC SURGERY

## 2019-07-11 NOTE — PROGRESS NOTES
Rashaun Quintanilla is a 59 y.o. male   Primary provider:  Allen Hammonds MD       Chief Complaint   Patient presents with   • Right Knee - Pain     Xray [facility] 4/8/19       HISTORY OF PRESENT ILLNESS:  Pain for 10 years  Has taken nsaids (ibuprofen or meloxicam) - which does help  Mostly an ache.  Mostly an intermittent pain  Occasional sharp pains  Worse with prolonged standing.  No problems with pivot or twist or squatting  No numbness or tingling.  No hip or back pain.  Did do some PT without significant improvement.      Pain   This is a new problem. Episode onset: March 2019. The problem occurs intermittently. The problem has been gradually worsening. Pertinent negatives include no chills or fever. Associated symptoms comments: Aching. He has tried acetaminophen, NSAIDs and rest for the symptoms. The treatment provided mild relief.        CONCURRENT MEDICAL HISTORY:    Past Medical History:   Diagnosis Date   • Abnormal chest x-ray    • Allergic rhinitis     Seasonal    • Cardiomegaly     Tendencies towards      • Chest pain    • Dyslipidemia    • Dysuria    • Eczema of hand     Bilateral hands      • Essential hypertension     On multidrug therapy      • Foot pain    • Gastritis    • GERD (gastroesophageal reflux disease)     Change omeprazole to Zantac twice daily, 5/2016      • H/O peptic ulcer    • Hyperlipidemia     Currently diet controlled      • Hypertriglyceridemia     low HDL      • Impaired fasting glucose     Impaired fasting glycaemia      • Injury of ankle, left     Left ankle sprain      • Joint effusion of the lower leg     Right knee. With episodic pain and occasional instability      • Knee pain     Right. With episodes of mild medial instability   • Lipoma of skin and subcutaneous tissue of neck      Apparent subepidermal cyst anterior neck. Not infected      • Lung nodule, solitary     Right upper lobe, noted at [facility] urgent care, 5/2016. Chest x-ray repeated by me, 5/16/2016   • Male erectile  disorder     exacerbated by HCTZ      • Osteoarthritis of knee     O/A, DJD      • Plantar fasciitis    • Screening for malignant neoplasm of prostate    • Sprain of foot, right    • Ventricular premature depolarization     Unifocal PVCs noted on [facility] screening EKG, summer 2015. started bisoprolol and referred to Dr. Braun, 11/2015.       No Known Allergies      Current Outpatient Medications:   •  amLODIPine-benazepril (LOTREL 5-20) 5-20 MG per capsule, Take one capsule daily, Disp: 90 capsule, Rfl: 3  •  aspirin 81 MG tablet, Take 81 mg by mouth Daily., Disp: , Rfl:   •  bisoprolol (ZEBeta) 5 MG tablet, Take 1/2 tablet daily, Disp: 45 tablet, Rfl: 3  •  hydrALAZINE (APRESOLINE) 25 MG tablet, 1-2 tablets Bid for hypertension, Disp: 360 tablet, Rfl: 3  •  meloxicam (MOBIC) 15 MG tablet, Take 1 tablet by mouth Daily As Needed (Take with food.)., Disp: 90 tablet, Rfl: 3  •  omeprazole (priLOSEC) 40 MG capsule, Take 1 capsule by mouth Daily., Disp: 90 capsule, Rfl: 3  •  tadalafil (CIALIS) 20 MG tablet, Take 20 mg by mouth As Needed for erectile dysfunction (1 tab(s) By Mouth 1 Hour prior prn)., Disp: , Rfl:   •  valsartan-hydrochlorothiazide (DIOVAN-HCT) 320-25 MG per tablet, Take 1 tablet by mouth Daily., Disp: 90 tablet, Rfl: 3    Past Surgical History:   Procedure Laterality Date   • ENDOSCOPY  11/2008    Colon endoscopy 01473 (1)        Family History   Problem Relation Age of Onset   • Lung cancer Mother         Age 50   • Colon cancer Paternal Uncle    • Diabetes Other    • Coronary artery disease Other        Social History     Socioeconomic History   • Marital status:      Spouse name: Not on file   • Number of children: Not on file   • Years of education: Not on file   • Highest education level: Not on file   Tobacco Use   • Smoking status: Never Smoker   • Smokeless tobacco: Never Used   Substance and Sexual Activity   • Alcohol use: No   • Drug use: No        Review of Systems   Constitutional:  "Negative.  Negative for chills and fever.   HENT: Negative.    Eyes: Negative.    Respiratory: Negative.    Cardiovascular: Negative.    Gastrointestinal: Negative.    Endocrine: Negative.    Genitourinary: Negative.    Musculoskeletal:        Knee pain   Skin: Negative.    Allergic/Immunologic: Negative.    Neurological: Negative.    Hematological: Negative.    Psychiatric/Behavioral: Negative.        PHYSICAL EXAMINATION:       Ht 185.4 cm (73\")   Wt 99.8 kg (220 lb)   BMI 29.03 kg/m²     Physical Exam   Constitutional: He is oriented to person, place, and time. He appears well-developed and well-nourished.   Neurological: He is alert and oriented to person, place, and time.   Psychiatric: He has a normal mood and affect. His behavior is normal. Judgment and thought content normal.       GAIT:     [x]  Normal  []  Antalgic    Assistive device: [x]  None  []  Walker     []  Crutches  []  Cane     []  Wheelchair  []  Stretcher    Right Knee Exam     Muscle Strength   The patient has normal right knee strength.    Tenderness   The patient is experiencing tenderness in the medial joint line (mild tenderness along medial joint line.).    Range of Motion   Extension: 0   Flexion: 130     Tests   Varus: negative Valgus: negative  Drawer:  Anterior - negative        Other   Erythema: absent  Sensation: normal  Pulse: present  Swelling: none      Left Knee Exam     Muscle Strength   The patient has normal left knee strength.    Tenderness   The patient is experiencing no tenderness.     Range of Motion   The patient has normal left knee ROM.    Tests   Varus: negative Valgus: negative  Drawer:  Anterior - negative         Other   Erythema: absent  Sensation: normal  Pulse: present  Swelling: none                         PROCEDURE: AP and lateral views right knee  4/8/19     COMPARISON: No comparison.     HISTORY: Right knee pain.     FINDINGS: No acute fracture, subluxation or focal osseous lesion.  Small joint effusion. " There are small posterior patellar  osteophytes.     IMPRESSION:  CONCLUSION:   Small joint effusion.     Electronically signed by:  Eh Tipton MD  4/8/2019 10:28 AM CDT  Workstation: [id number]      ASSESSMENT:    Diagnoses and all orders for this visit:    Acute pain of right knee    Pes planus of both feet  -     Ambulatory Referral to Physical Therapy Evaluate and treat    Internal derangement of right knee    Essential hypertension          PLAN    Currently is able to get by using 2-3 meloxicam tablets per week.  He is not having pain with pivoting and twisting.  We discussed the possibility of a steroid injection or the possibility of an MRI of his right knee if his symptoms worsen.  Otherwise, he will continue in this fashion.  We discussed strengthening conditioning exercises on a regular basis.  Follow-up as needed.    We will send in order to remake his orthotics for his feet.    Patient's Body mass index is 29.03 kg/m². BMI is within normal parameters. No follow-up required..      Return if symptoms worsen or fail to improve, for recheck.    Jakub Yoo MD

## 2019-07-24 ENCOUNTER — HOSPITAL ENCOUNTER (OUTPATIENT)
Dept: PHYSICAL THERAPY | Facility: HOSPITAL | Age: 59
Setting detail: THERAPIES SERIES
Discharge: HOME | End: 2019-07-24

## 2019-07-24 DIAGNOSIS — M21.42 PES PLANUS OF BOTH FEET: Primary | ICD-10-CM

## 2019-07-24 DIAGNOSIS — M21.41 PES PLANUS OF BOTH FEET: Primary | ICD-10-CM

## 2019-07-24 DIAGNOSIS — M72.2 PLANTAR FASCIAL FIBROMATOSIS: ICD-10-CM

## 2019-07-24 PROCEDURE — L3031 FOOT LAMIN/PREPREG COMPOSITE: HCPCS | Performed by: PHYSICAL THERAPIST

## 2019-07-24 PROCEDURE — 97161 PT EVAL LOW COMPLEX 20 MIN: CPT | Performed by: PHYSICAL THERAPIST

## 2019-07-24 NOTE — THERAPY EVALUATION
Outpatient Physical Therapy Ortho Initial Evaluation  [facility]     Patient Name: Rashaun Quintanilla  : 1960  MRN: [id number]  Today's Date: 2019      Visit Date: 2019 orthotics only    Subjective Evaluation    History of Present Illness    Subjective comment: Needs new orthotics, current pair 2017  Patient Occupation: [employer] work Pain  Current pain ratin  At best pain ratin  At worst pain ratin    Social Support  Lives with: spouse          Patient Active Problem List   Diagnosis   • Chest pain   • Essential hypertension   • Viral gastroenteritis   • Ventricular premature depolarization   • Sprain of foot, right   • Screening for malignant neoplasm of prostate   • Plantar fasciitis   • Osteoarthritis of knee   • Male erectile disorder   • Lung nodule, solitary   • Lipoma of skin and subcutaneous tissue of neck   • Knee pain   • Joint effusion of the lower leg   • Injury of ankle, left   • Impaired fasting glucose   • Hypertriglyceridemia   • Hyperlipidemia   • H/O peptic ulcer   • GERD (gastroesophageal reflux disease)   • Gastritis   • Foot pain   • Eczema of hand   • Dysuria   • Dyslipidemia   • Cardiomegaly   • Allergic rhinitis   • Abnormal chest x-ray   • Benign non-nodular prostatic hyperplasia with lower urinary tract symptoms        Past Medical History:   Diagnosis Date   • Abnormal chest x-ray    • Allergic rhinitis     Seasonal    • Cardiomegaly     Tendencies towards      • Chest pain    • Dyslipidemia    • Dysuria    • Eczema of hand     Bilateral hands      • Essential hypertension     On multidrug therapy      • Foot pain    • Gastritis    • GERD (gastroesophageal reflux disease)     Change omeprazole to Zantac twice daily, 2016      • H/O peptic ulcer    • Hyperlipidemia     Currently diet controlled      • Hypertriglyceridemia     low HDL      • Impaired fasting glucose     Impaired fasting glycaemia      • Injury of ankle, left     Left  ankle sprain      • Joint effusion of the lower leg     Right knee. With episodic pain and occasional instability      • Knee pain     Right. With episodes of mild medial instability   • Lipoma of skin and subcutaneous tissue of neck      Apparent subepidermal cyst anterior neck. Not infected      • Lung nodule, solitary     Right upper lobe, noted at [facility] urgent care, 5/2016. Chest x-ray repeated by me, 5/16/2016   • Male erectile disorder     exacerbated by HCTZ      • Osteoarthritis of knee     O/A, DJD      • Plantar fasciitis    • Screening for malignant neoplasm of prostate    • Sprain of foot, right    • Ventricular premature depolarization     Unifocal PVCs noted on [facility] screening EKG, summer 2015. started bisoprolol and referred to Dr. Braun, 11/2015.        Past Surgical History:   Procedure Laterality Date   • ENDOSCOPY  11/2008    Colon endoscopy 55623 (1)        Visit Dx:     ICD-10-CM ICD-9-CM   1. Pes planus of both feet M21.41 734    M21.42    2. Plantar fascial fibromatosis M72.2 728.71           Objective    Patient presents under no apparent distress he has current orthotic present there is a crack on the right forefoot laterally with peeling covers on both orthotics.  They are dated in 2017 and in need of replacement.  Foot and ankle mobility is within normal limits.  Strength 5 out of 5.  Sensation intact to crude light touch.  Skin is warm, dry with callusing.  Intact with no open wounds.  Standing stance shows positive navicular drop with pes planus foot flat to meet the floor.  There is no tenderness to palpation present currently.  Wears orthotics daily at work.  Treatment: Three-quarter length aquatemp with regular cover.  Posted full on the bottom for best endurance.       Assessment/Plan     PT OP Goals     Row Name 07/24/19 1000          PT Short Term Goals    STG Date to Achieve  08/14/19  -DD     STG 1  Patient will be independent in care and wear schedule of orthosis  -DD     STG 2   Patient have a comfortable fit  -DD     STG 3  Patient understand indications for follow-up and adjustments as needed  -DD        Time Calculation    PT Goal Re-Cert Due Date  08/14/19  -DD       User Key  (r) = Recorded By, (t) = Taken By, (c) = Cosigned By    Initials Name Provider Type    Geraldine Wynn, PT DPT Physical Therapist          PT Assessment/Plan     Row Name 07/24/19 1003          PT Assessment    Functional Limitations  Impaired gait  -DD     Impairments  Gait;Poor body mechanics  -DD     Assessment Comments  Patient has congenital pes planus issues with plantar fascial pain if longitudinal arch is not supported.  He has worn custom orthotics for several years which alleviate his pain during standing walking and work activities.  He is here today for a replacement pair last pair made December 2017.  -DD     Rehab Potential  Excellent  -DD     Patient/caregiver participated in establishment of treatment plan and goals  Yes  -DD        PT Plan    PT Frequency  1x/week  -DD     Predicted Duration of Therapy Intervention (Therapy Eval)  1 x  -DD     Planned CPT's?  PT EVAL LOW COMPLEXITY: 13183;PT ORTHOTIC MGMT/TRAIN EA 15 MIN: 86757  -DD     Physical Therapy Interventions (Optional Details)  orthotic fitting/training  -DD     PT Plan Comments  call for appointment, pt has had multiple pairs.  -DD       User Key  (r) = Recorded By, (t) = Taken By, (c) = Cosigned By    Initials Name Provider Type    Geraldine Wynn, PT DPT Physical Therapist          Time Calculation:     Start Time: 0930  Stop Time: 0958  Time Calculation (min): 28 min  Total Timed Code Minutes- PT: 0 minute(s)     Therapy Charges for Today     Code Description Service Date Service Provider Modifiers Qty    68999718238 HC PT EVAL LOW COMPLEXITY 1 7/24/2019 Geraldine Jaime, PT DPT GP 1    29668242302 HC SPLINT FOOT SUPR/POST LEAF SPRING  7/24/2019 Geraldine Jaime, PT DPT  1          Geraldine ELDRIDGE.  Addison, PT DPT, ATC  7/24/2019

## 2019-08-15 ENCOUNTER — TELEPHONE (OUTPATIENT)
Dept: ORTHOPEDIC SURGERY | Facility: CLINIC | Age: 59
End: 2019-08-15

## 2019-08-15 NOTE — TELEPHONE ENCOUNTER
DR PARKS.  PATIENT CALLED STATING YOU HAD GIVEN HIM AN ORDER FOR ORTHODICS, BUT HIS INSURANCE HAS REFUSED TO PAY FOR THEM.  HE IS ASKING IF YOU COULD GIVE HIM A STATEMENT THAT THE ORTHODICS ARE MEDICALLY NECESSARY BECAUSE HE HAD BEEN DIAGNOSED WITH PLANTAR FACIITIS

## 2019-08-15 NOTE — LETTER
August 16, 2019     Patient: Rashaun Quintanilla   YOB: 1960   Date of Visit: 8/15/2019       To Whom It May Concern:    It is my medical opinion that orthotics are medically necessary for Rashaun Quintanilla due to his plantar fascitis.            Sincerely,        Jakub Yoo MD

## 2019-08-16 NOTE — TELEPHONE ENCOUNTER
Yes but it probably wont matter, insurance will do whatever they want.  Provide the statement please.  SKYLAR

## 2019-11-19 ENCOUNTER — LAB (OUTPATIENT)
Dept: LAB | Facility: OTHER | Age: 59
End: 2019-11-19

## 2019-11-19 DIAGNOSIS — Z12.5 SCREENING FOR MALIGNANT NEOPLASM OF PROSTATE: ICD-10-CM

## 2019-11-19 DIAGNOSIS — E78.2 MIXED HYPERLIPIDEMIA: Chronic | ICD-10-CM

## 2019-11-19 DIAGNOSIS — I10 ESSENTIAL HYPERTENSION: ICD-10-CM

## 2019-11-19 DIAGNOSIS — R73.01 IMPAIRED FASTING GLUCOSE: ICD-10-CM

## 2019-11-19 LAB
ALBUMIN SERPL-MCNC: 4.3 G/DL (ref 3.5–5)
ALBUMIN/GLOB SERPL: 1.7 G/DL (ref 1.1–1.8)
ALP SERPL-CCNC: 56 U/L (ref 38–126)
ALT SERPL W P-5'-P-CCNC: 25 U/L
ANION GAP SERPL CALCULATED.3IONS-SCNC: 8 MMOL/L (ref 5–15)
AST SERPL-CCNC: 24 U/L (ref 17–59)
BASOPHILS # BLD AUTO: 0.03 10*3/MM3 (ref 0–0.2)
BASOPHILS NFR BLD AUTO: 0.6 % (ref 0–1.5)
BILIRUB SERPL-MCNC: 1.3 MG/DL (ref 0.2–1.3)
BUN BLD-MCNC: 15 MG/DL (ref 7–23)
BUN/CREAT SERPL: 13.4 (ref 7–25)
CALCIUM SPEC-SCNC: 9.6 MG/DL (ref 8.4–10.2)
CHLORIDE SERPL-SCNC: 104 MMOL/L (ref 101–112)
CHOLEST SERPL-MCNC: 121 MG/DL (ref 150–200)
CO2 SERPL-SCNC: 31 MMOL/L (ref 22–30)
CREAT BLD-MCNC: 1.12 MG/DL (ref 0.7–1.3)
DEPRECATED RDW RBC AUTO: 45.4 FL (ref 37–54)
EOSINOPHIL # BLD AUTO: 0.09 10*3/MM3 (ref 0–0.4)
EOSINOPHIL NFR BLD AUTO: 1.8 % (ref 0.3–6.2)
ERYTHROCYTE [DISTWIDTH] IN BLOOD BY AUTOMATED COUNT: 13.7 % (ref 12.3–15.4)
GFR SERPL CREATININE-BSD FRML MDRD: 67 ML/MIN/1.73 (ref 49–113)
GLOBULIN UR ELPH-MCNC: 2.6 GM/DL (ref 2.3–3.5)
GLUCOSE BLD-MCNC: 107 MG/DL (ref 70–99)
HBA1C MFR BLD: 5.5 % (ref 4.8–5.6)
HCT VFR BLD AUTO: 43.2 % (ref 37.5–51)
HDLC SERPL-MCNC: 31 MG/DL (ref 40–59)
HGB BLD-MCNC: 14.4 G/DL (ref 13–17.7)
LDLC SERPL CALC-MCNC: 76 MG/DL
LDLC/HDLC SERPL: 2.44 {RATIO} (ref 0–3.55)
LYMPHOCYTES # BLD AUTO: 1.05 10*3/MM3 (ref 0.7–3.1)
LYMPHOCYTES NFR BLD AUTO: 21 % (ref 19.6–45.3)
MCH RBC QN AUTO: 30.6 PG (ref 26.6–33)
MCHC RBC AUTO-ENTMCNC: 33.3 G/DL (ref 31.5–35.7)
MCV RBC AUTO: 91.9 FL (ref 79–97)
MONOCYTES # BLD AUTO: 0.5 10*3/MM3 (ref 0.1–0.9)
MONOCYTES NFR BLD AUTO: 10 % (ref 5–12)
NEUTROPHILS # BLD AUTO: 3.34 10*3/MM3 (ref 1.7–7)
NEUTROPHILS NFR BLD AUTO: 66.6 % (ref 42.7–76)
PLATELET # BLD AUTO: 169 10*3/MM3 (ref 140–450)
PMV BLD AUTO: 10.5 FL (ref 6–12)
POTASSIUM BLD-SCNC: 4.2 MMOL/L (ref 3.4–5)
PROT SERPL-MCNC: 6.9 G/DL (ref 6.3–8.6)
PSA SERPL-MCNC: 3.87 NG/ML (ref 0–4)
RBC # BLD AUTO: 4.7 10*6/MM3 (ref 4.14–5.8)
SODIUM BLD-SCNC: 143 MMOL/L (ref 137–145)
TRIGL SERPL-MCNC: 72 MG/DL
TSH SERPL DL<=0.05 MIU/L-ACNC: 1.41 UIU/ML (ref 0.27–4.2)
VLDLC SERPL-MCNC: 14.4 MG/DL
WBC NRBC COR # BLD: 5.01 10*3/MM3 (ref 3.4–10.8)

## 2019-11-19 PROCEDURE — 85025 COMPLETE CBC W/AUTO DIFF WBC: CPT | Performed by: INTERNAL MEDICINE

## 2019-11-19 PROCEDURE — 83036 HEMOGLOBIN GLYCOSYLATED A1C: CPT | Performed by: INTERNAL MEDICINE

## 2019-11-19 PROCEDURE — 80061 LIPID PANEL: CPT | Performed by: INTERNAL MEDICINE

## 2019-11-19 PROCEDURE — 80053 COMPREHEN METABOLIC PANEL: CPT | Performed by: INTERNAL MEDICINE

## 2019-11-19 PROCEDURE — G0103 PSA SCREENING: HCPCS | Performed by: INTERNAL MEDICINE

## 2019-11-19 PROCEDURE — 36415 COLL VENOUS BLD VENIPUNCTURE: CPT | Performed by: INTERNAL MEDICINE

## 2019-11-19 PROCEDURE — 84443 ASSAY THYROID STIM HORMONE: CPT | Performed by: INTERNAL MEDICINE

## 2019-12-02 ENCOUNTER — OFFICE VISIT (OUTPATIENT)
Dept: FAMILY MEDICINE CLINIC | Facility: CLINIC | Age: 59
End: 2019-12-02

## 2019-12-02 VITALS
DIASTOLIC BLOOD PRESSURE: 80 MMHG | HEIGHT: 73 IN | TEMPERATURE: 97.6 F | WEIGHT: 218.6 LBS | BODY MASS INDEX: 28.97 KG/M2 | HEART RATE: 56 BPM | SYSTOLIC BLOOD PRESSURE: 146 MMHG

## 2019-12-02 DIAGNOSIS — E78.2 MIXED HYPERLIPIDEMIA: Chronic | ICD-10-CM

## 2019-12-02 DIAGNOSIS — I49.3 VENTRICULAR PREMATURE DEPOLARIZATION: ICD-10-CM

## 2019-12-02 DIAGNOSIS — I10 ESSENTIAL HYPERTENSION: Primary | Chronic | ICD-10-CM

## 2019-12-02 DIAGNOSIS — R09.82 POSTNASAL DRIP: ICD-10-CM

## 2019-12-02 DIAGNOSIS — K21.9 GASTROESOPHAGEAL REFLUX DISEASE WITHOUT ESOPHAGITIS: Chronic | ICD-10-CM

## 2019-12-02 DIAGNOSIS — E78.1 HYPERTRIGLYCERIDEMIA: Chronic | ICD-10-CM

## 2019-12-02 DIAGNOSIS — Z12.5 SPECIAL SCREENING FOR MALIGNANT NEOPLASM OF PROSTATE: ICD-10-CM

## 2019-12-02 DIAGNOSIS — R73.01 IMPAIRED FASTING GLUCOSE: Chronic | ICD-10-CM

## 2019-12-02 PROCEDURE — 99214 OFFICE O/P EST MOD 30 MIN: CPT | Performed by: INTERNAL MEDICINE

## 2019-12-02 RX ORDER — OMEPRAZOLE 40 MG/1
40 CAPSULE, DELAYED RELEASE ORAL DAILY
Qty: 90 CAPSULE | Refills: 3 | Status: SHIPPED | OUTPATIENT
Start: 2019-12-02 | End: 2020-12-07

## 2019-12-02 RX ORDER — HYDRALAZINE HYDROCHLORIDE 25 MG/1
TABLET, FILM COATED ORAL
Qty: 180 TABLET | Refills: 3 | Status: SHIPPED | OUTPATIENT
Start: 2019-12-02 | End: 2020-12-15 | Stop reason: SDUPTHER

## 2019-12-02 RX ORDER — PRAMIPEXOLE DIHYDROCHLORIDE 0.25 MG/1
0.25 TABLET ORAL AS NEEDED
COMMUNITY
End: 2019-12-02

## 2019-12-02 RX ORDER — AMLODIPINE BESYLATE AND BENAZEPRIL HYDROCHLORIDE 5; 20 MG/1; MG/1
CAPSULE ORAL
Qty: 90 CAPSULE | Refills: 3 | Status: SHIPPED | OUTPATIENT
Start: 2019-12-02 | End: 2020-12-15

## 2019-12-02 RX ORDER — VALSARTAN AND HYDROCHLOROTHIAZIDE 320; 25 MG/1; MG/1
1 TABLET, FILM COATED ORAL DAILY
Qty: 90 TABLET | Refills: 3 | Status: SHIPPED | OUTPATIENT
Start: 2019-12-02 | End: 2020-12-15 | Stop reason: SDUPTHER

## 2019-12-02 NOTE — PATIENT INSTRUCTIONS
Calorie Counting for Weight Loss  Calories are units of energy. Your body needs a certain amount of calories from food to keep you going throughout the day. When you eat more calories than your body needs, your body stores the extra calories as fat. When you eat fewer calories than your body needs, your body burns fat to get the energy it needs.  Calorie counting means keeping track of how many calories you eat and drink each day. Calorie counting can be helpful if you need to lose weight. If you make sure to eat fewer calories than your body needs, you should lose weight. Ask your health care provider what a healthy weight is for you.  For calorie counting to work, you will need to eat the right number of calories in a day in order to lose a healthy amount of weight per week. A dietitian can help you determine how many calories you need in a day and will give you suggestions on how to reach your calorie goal.  · A healthy amount of weight to lose per week is usually 1-2 lb (0.5-0.9 kg). This usually means that your daily calorie intake should be reduced by 500-750 calories.  · Eating 1,200 - 1,500 calories per day can help most women lose weight.  · Eating 1,500 - 1,800 calories per day can help most men lose weight.  What is my plan?  My goal is to have __________ calories per day.  If I have this many calories per day, I should lose around __________ pounds per week.  What do I need to know about calorie counting?  In order to meet your daily calorie goal, you will need to:  · Find out how many calories are in each food you would like to eat. Try to do this before you eat.  · Decide how much of the food you plan to eat.  · Write down what you ate and how many calories it had. Doing this is called keeping a food log.  To successfully lose weight, it is important to balance calorie counting with a healthy lifestyle that includes regular activity. Aim for 150 minutes of moderate exercise (such as walking) or 75  minutes of vigorous exercise (such as running) each week.  Where do I find calorie information?    The number of calories in a food can be found on a Nutrition Facts label. If a food does not have a Nutrition Facts label, try to look up the calories online or ask your dietitian for help.  Remember that calories are listed per serving. If you choose to have more than one serving of a food, you will have to multiply the calories per serving by the amount of servings you plan to eat. For example, the label on a package of bread might say that a serving size is 1 slice and that there are 90 calories in a serving. If you eat 1 slice, you will have eaten 90 calories. If you eat 2 slices, you will have eaten 180 calories.  How do I keep a food log?  Immediately after each meal, record the following information in your food log:  · What you ate. Don't forget to include toppings, sauces, and other extras on the food.  · How much you ate. This can be measured in cups, ounces, or number of items.  · How many calories each food and drink had.  · The total number of calories in the meal.  Keep your food log near you, such as in a small notebook in your pocket, or use a mobile marcus or website. Some programs will calculate calories for you and show you how many calories you have left for the day to meet your goal.  What are some calorie counting tips?    · Use your calories on foods and drinks that will fill you up and not leave you hungry:  ? Some examples of foods that fill you up are nuts and nut butters, vegetables, lean proteins, and high-fiber foods like whole grains. High-fiber foods are foods with more than 5 g fiber per serving.  ? Drinks such as sodas, specialty coffee drinks, alcohol, and juices have a lot of calories, yet do not fill you up.  · Eat nutritious foods and avoid empty calories. Empty calories are calories you get from foods or beverages that do not have many vitamins or protein, such as candy, sweets, and  "soda. It is better to have a nutritious high-calorie food (such as an avocado) than a food with few nutrients (such as a bag of chips).  · Know how many calories are in the foods you eat most often. This will help you calculate calorie counts faster.  · Pay attention to calories in drinks. Low-calorie drinks include water and unsweetened drinks.  · Pay attention to nutrition labels for \"low fat\" or \"fat free\" foods. These foods sometimes have the same amount of calories or more calories than the full fat versions. They also often have added sugar, starch, or salt, to make up for flavor that was removed with the fat.  · Find a way of tracking calories that works for you. Get creative. Try different apps or programs if writing down calories does not work for you.  What are some portion control tips?  · Know how many calories are in a serving. This will help you know how many servings of a certain food you can have.  · Use a measuring cup to measure serving sizes. You could also try weighing out portions on a kitchen scale. With time, you will be able to estimate serving sizes for some foods.  · Take some time to put servings of different foods on your favorite plates, bowls, and cups so you know what a serving looks like.  · Try not to eat straight from a bag or box. Doing this can lead to overeating. Put the amount you would like to eat in a cup or on a plate to make sure you are eating the right portion.  · Use smaller plates, glasses, and bowls to prevent overeating.  · Try not to multitask (for example, watch TV or use your computer) while eating. If it is time to eat, sit down at a table and enjoy your food. This will help you to know when you are full. It will also help you to be aware of what you are eating and how much you are eating.  What are tips for following this plan?  Reading food labels  · Check the calorie count compared to the serving size. The serving size may be smaller than what you are used to " "eating.  · Check the source of the calories. Make sure the food you are eating is high in vitamins and protein and low in saturated and trans fats.  Shopping  · Read nutrition labels while you shop. This will help you make healthy decisions before you decide to purchase your food.  · Make a grocery list and stick to it.  Cooking  · Try to cook your favorite foods in a healthier way. For example, try baking instead of frying.  · Use low-fat dairy products.  Meal planning  · Use more fruits and vegetables. Half of your plate should be fruits and vegetables.  · Include lean proteins like poultry and fish.  How do I count calories when eating out?  · Ask for smaller portion sizes.  · Consider sharing an entree and sides instead of getting your own entree.  · If you get your own entree, eat only half. Ask for a box at the beginning of your meal and put the rest of your entree in it so you are not tempted to eat it.  · If calories are listed on the menu, choose the lower calorie options.  · Choose dishes that include vegetables, fruits, whole grains, low-fat dairy products, and lean protein.  · Choose items that are boiled, broiled, grilled, or steamed. Stay away from items that are buttered, battered, fried, or served with cream sauce. Items labeled \"crispy\" are usually fried, unless stated otherwise.  · Choose water, low-fat milk, unsweetened iced tea, or other drinks without added sugar. If you want an alcoholic beverage, choose a lower calorie option such as a glass of wine or light beer.  · Ask for dressings, sauces, and syrups on the side. These are usually high in calories, so you should limit the amount you eat.  · If you want a salad, choose a garden salad and ask for grilled meats. Avoid extra toppings like coon, cheese, or fried items. Ask for the dressing on the side, or ask for olive oil and vinegar or lemon to use as dressing.  · Estimate how many servings of a food you are given. For example, a serving of " cooked rice is ½ cup or about the size of half a baseball. Knowing serving sizes will help you be aware of how much food you are eating at restaurants. The list below tells you how big or small some common portion sizes are based on everyday objects:  ? 1 oz--4 stacked dice.  ? 3 oz--1 deck of cards.  ? 1 tsp--1 die.  ? 1 Tbsp--½ a ping-pong ball.  ? 2 Tbsp--1 ping-pong ball.  ? ½ cup--½ baseball.  ? 1 cup--1 baseball.  Summary  · Calorie counting means keeping track of how many calories you eat and drink each day. If you eat fewer calories than your body needs, you should lose weight.  · A healthy amount of weight to lose per week is usually 1-2 lb (0.5-0.9 kg). This usually means reducing your daily calorie intake by 500-750 calories.  · The number of calories in a food can be found on a Nutrition Facts label. If a food does not have a Nutrition Facts label, try to look up the calories online or ask your dietitian for help.  · Use your calories on foods and drinks that will fill you up, and not on foods and drinks that will leave you hungry.  · Use smaller plates, glasses, and bowls to prevent overeating.  This information is not intended to replace advice given to you by your health care provider. Make sure you discuss any questions you have with your health care provider.  Document Released: 12/18/2006 Document Revised: 09/06/2019 Document Reviewed: 11/17/2017  [employer] Interactive Patient Education © 2019 [employer]      Exercising to Lose Weight  Exercise is structured, repetitive physical activity to improve fitness and health. Getting regular exercise is important for everyone. It is especially important if you are overweight. Being overweight increases your risk of heart disease, stroke, diabetes, high blood pressure, and several types of cancer. Reducing your calorie intake and exercising can help you lose weight.  Exercise is usually categorized as moderate or vigorous intensity. To lose weight, most  people need to do a certain amount of moderate-intensity or vigorous-intensity exercise each week.  Moderate-intensity exercise    Moderate-intensity exercise is any activity that gets you moving enough to burn at least three times more energy (calories) than if you were sitting.  Examples of moderate exercise include:  · Walking a mile in 15 minutes.  · Doing light yard work.  · Biking at an easy pace.  Most people should get at least 150 minutes (2 hours and 30 minutes) a week of moderate-intensity exercise to maintain their body weight.  Vigorous-intensity exercise  Vigorous-intensity exercise is any activity that gets you moving enough to burn at least six times more calories than if you were sitting. When you exercise at this intensity, you should be working hard enough that you are not able to carry on a conversation.  Examples of vigorous exercise include:  · Running.  · Playing a team sport, such as football, basketball, and soccer.  · Jumping rope.  Most people should get at least 75 minutes (1 hour and 15 minutes) a week of vigorous-intensity exercise to maintain their body weight.  How can exercise affect me?  When you exercise enough to burn more calories than you eat, you lose weight. Exercise also reduces body fat and builds muscle. The more muscle you have, the more calories you burn. Exercise also:  · Improves mood.  · Reduces stress and tension.  · Improves your overall fitness, flexibility, and endurance.  · Increases bone strength.  The amount of exercise you need to lose weight depends on:  · Your age.  · The type of exercise.  · Any health conditions you have.  · Your overall physical ability.  Talk to your health care provider about how much exercise you need and what types of activities are safe for you.  What actions can I take to lose weight?  Nutrition    · Make changes to your diet as told by your health care provider or diet and nutrition specialist (dietitian). This may  include:  ? Eating fewer calories.  ? Eating more protein.  ? Eating less unhealthy fats.  ? Eating a diet that includes fresh fruits and vegetables, whole grains, low-fat dairy products, and lean protein.  ? Avoiding foods with added fat, salt, and sugar.  · Drink plenty of water while you exercise to prevent dehydration or heat stroke.  Activity  · Choose an activity that you enjoy and set realistic goals. Your health care provider can help you make an exercise plan that works for you.  · Exercise at a moderate or vigorous intensity most days of the week.  ? The intensity of exercise may vary from person to person. You can tell how intense a workout is for you by paying attention to your breathing and heartbeat. Most people will notice their breathing and heartbeat get faster with more intense exercise.  · Do resistance training twice each week, such as:  ? Push-ups.  ? Sit-ups.  ? Lifting weights.  ? Using resistance bands.  · Getting short amounts of exercise can be just as helpful as long structured periods of exercise. If you have trouble finding time to exercise, try to include exercise in your daily routine.  ? Get up, stretch, and walk around every 30 minutes throughout the day.  ? Go for a walk during your lunch break.  ? Park your car farther away from your destination.  ? If you take public transportation, get off one stop early and walk the rest of the way.  ? Make phone calls while standing up and walking around.  ? Take the stairs instead of elevators or escalators.  · Wear comfortable clothes and shoes with good support.  · Do not exercise so much that you hurt yourself, feel dizzy, or get very short of breath.  Where to find more information  · [place] Department of Health and Human Services: www.hhs.gov  · Centers for Disease Control and Prevention (CDC): www.cdc.gov  Contact a health care provider:  · Before starting a new exercise program.  · If you have questions or concerns about your  weight.  · If you have a medical problem that keeps you from exercising.  Get help right away if you have any of the following while exercising:  · Injury.  · Dizziness.  · Difficulty breathing or shortness of breath that does not go away when you stop exercising.  · Chest pain.  · Rapid heartbeat.  Summary  · Being overweight increases your risk of heart disease, stroke, diabetes, high blood pressure, and several types of cancer.  · Losing weight happens when you burn more calories than you eat.  · Reducing the amount of calories you eat in addition to getting regular moderate or vigorous exercise each week helps you lose weight.  This information is not intended to replace advice given to you by your health care provider. Make sure you discuss any questions you have with your health care provider.  Document Released: 01/20/2012 Document Revised: 12/31/2018 Document Reviewed: 12/31/2018  [employer] Interactive Patient Education © 2019 [employer]

## 2019-12-03 NOTE — PROGRESS NOTES
Subjective        History of Present Illness     Rashaun Quintanilla is a 59 y.o. male who presents for annual follow up on hypertension, dyslipidemia, impaired fasting glucose, and GERD among other issues.  Impaired fasting glucose has very slightly progressed. He has a maternal and paternal family history of diabetes.  He reports occasional cramps of lower extremities and hands, which occurs most often during the daytime.  He has found taking a tablespoon of mustard helps with the cramps.  I offered Mirapex with a previous visit, but patient was reluctant to start after reading side effects.        He had normal colonoscopy by Dr. Garcia on 07/30/2018 with 10-year repeat recommended.  He had influenza vaccine 10/29/2019 at the health fair through his employer.      Weight is stable.  Blood pressure marginal today.  He brings in a diary revealing excellent BP management with systolic averaging in the 120s to low 130s with heart rate in the 60s.     PSA very gradually increasing at 3.87.  He reports 2-3 episodes of nocturia, but denies significant BPH symptoms.  We can add BPH medication int the future if needed.      The patient's relevant past medical, surgical, and social history was reviewed in Epic.   Lab results are reviewed with the patient today.   CBC unremarkable.  Fasting glucose 107. A1c is 5.5.   Normal renal and liver function.  Total cholesterol 121.  HDL 31.  LDL 76.  Triglycerides 72.  LDL/HDL ratio 2.44.  Normal electrolytes.  Normal thyroid function.      Review of Systems   Constitutional: Negative for chills, fatigue and fever.   HENT: Negative for congestion, ear pain, postnasal drip, sinus pressure and sore throat.    Respiratory: Negative for cough, shortness of breath and wheezing.    Cardiovascular: Negative for chest pain, palpitations and leg swelling.   Gastrointestinal: Negative for abdominal pain, blood in stool, constipation, diarrhea, nausea and vomiting.   Endocrine: Negative for cold  "intolerance, heat intolerance, polydipsia and polyuria.   Genitourinary: Negative for dysuria, frequency, hematuria and urgency.   Skin: Negative for rash.   Neurological: Negative for syncope and weakness.        Objective     Visit Vitals  /80   Pulse 56   Temp 97.6 °F (36.4 °C) (Oral)   Ht 185.4 cm (73\")   Wt 99.2 kg (218 lb 9.6 oz)   BMI 28.84 kg/m²     Physical Exam   Constitutional: He is oriented to person, place, and time. He appears well-developed and well-nourished. No distress.   HENT:   Head: Normocephalic and atraumatic.   Nose: Right sinus exhibits no maxillary sinus tenderness and no frontal sinus tenderness. Left sinus exhibits no maxillary sinus tenderness and no frontal sinus tenderness.   Mouth/Throat: Uvula is midline, oropharynx is clear and moist and mucous membranes are normal. No oral lesions. No tonsillar exudate.   Eyes: Conjunctivae and EOM are normal. Pupils are equal, round, and reactive to light.   Neck: Trachea normal. Neck supple. No JVD present. Carotid bruit is not present. No tracheal deviation present. No thyroid mass and no thyromegaly present.   Cardiovascular: Normal rate, regular rhythm, normal heart sounds and intact distal pulses.  No extrasystoles are present. PMI is not displaced.   No murmur heard.  Pulmonary/Chest: Effort normal and breath sounds normal. No accessory muscle usage. No respiratory distress. He has no decreased breath sounds. He has no wheezes. He has no rhonchi. He has no rales.   Abdominal: Soft. Bowel sounds are normal. He exhibits no distension. There is no hepatosplenomegaly. There is no tenderness.     Vascular Status -  His right foot exhibits normal foot vasculature  and no edema. His left foot exhibits normal foot vasculature  and no edema.  Lymphadenopathy:     He has no cervical adenopathy.   Neurological: He is alert and oriented to person, place, and time. No cranial nerve deficit. Coordination normal.   Skin: Skin is warm, dry and " intact. No rash noted. No cyanosis. Nails show no clubbing.   Psychiatric: He has a normal mood and affect. His speech is normal and behavior is normal. Judgment and thought content normal.   Vitals reviewed.      Assessment/Plan      Continue the current multidrug therapy for his hypertension.    I recommended warm salt water gargles, Flonase and daily antihistamine to manage postnasal drainage and seasonal allergy flares.     Continue diet control for dyslipidemia.  Pursue weight loss.    Continue omeprazole entire reflux measures.    To delay further progression of impaired fasting glucose, I recommended diet, exercise and weight loss efforts.  Written literature regarding diet and exercise included in patient's AVS today.      Return in one year for annual exam with fasting labs one week prior or sooner if needed.       Scribed for Dr. Hammonds by Arlette Redd [facility].     Diagnoses and all orders for this visit:    Essential hypertension  -     CBC Auto Differential; Future  -     Comprehensive Metabolic Panel; Future    Mixed hyperlipidemia  -     Lipid Panel; Future  -     TSH; Future    Hypertriglyceridemia    Ventricular premature depolarization    Gastroesophageal reflux disease without esophagitis    Impaired fasting glucose  -     Hemoglobin A1c; Future    Special screening for malignant neoplasm of prostate  -     PSA Screen; Future    Postnasal drip    Other orders  -     Discontinue: pramipexole (MIRAPEX) 0.25 MG tablet; Take 0.25 mg by mouth As Needed.  -     amLODIPine-benazepril (LOTREL 5-20) 5-20 MG per capsule; Take one capsule daily  -     hydrALAZINE (APRESOLINE) 25 MG tablet; 2 tabs daily  -     omeprazole (priLOSEC) 40 MG capsule; Take 1 capsule by mouth Daily.  -     valsartan-hydrochlorothiazide (DIOVAN-HCT) 320-25 MG per tablet; Take 1 tablet by mouth Daily.        Lab on 11/19/2019   Component Date Value Ref Range Status   • Glucose 11/19/2019 107* 70 - 99 mg/dL Final   • BUN 11/19/2019  15  7 - 23 mg/dL Final   • Creatinine 11/19/2019 1.12  0.70 - 1.30 mg/dL Final   • Sodium 11/19/2019 143  137 - 145 mmol/L Final   • Potassium 11/19/2019 4.2  3.4 - 5.0 mmol/L Final   • Chloride 11/19/2019 104  101 - 112 mmol/L Final   • CO2 11/19/2019 31.0* 22.0 - 30.0 mmol/L Final   • Calcium 11/19/2019 9.6  8.4 - 10.2 mg/dL Final   • Total Protein 11/19/2019 6.9  6.3 - 8.6 g/dL Final   • Albumin 11/19/2019 4.30  3.50 - 5.00 g/dL Final   • ALT (SGPT) 11/19/2019 25  <=50 U/L Final   • AST (SGOT) 11/19/2019 24  17 - 59 U/L Final   • Alkaline Phosphatase 11/19/2019 56  38 - 126 U/L Final   • Total Bilirubin 11/19/2019 1.3  0.2 - 1.3 mg/dL Final   • eGFR Non  Amer 11/19/2019 67  49 - 113 mL/min/1.73 Final   • Globulin 11/19/2019 2.6  2.3 - 3.5 gm/dL Final   • A/G Ratio 11/19/2019 1.7  1.1 - 1.8 g/dL Final   • BUN/Creatinine Ratio 11/19/2019 13.4  7.0 - 25.0 Final   • Anion Gap 11/19/2019 8.0  5.0 - 15.0 mmol/L Final   • Hemoglobin A1C 11/19/2019 5.50  4.80 - 5.60 % Final   • Total Cholesterol 11/19/2019 121* 150 - 200 mg/dL Final   • Triglycerides 11/19/2019 72  <=150 mg/dL Final   • HDL Cholesterol 11/19/2019 31* 40 - 59 mg/dL Final   • LDL Cholesterol  11/19/2019 76  <=100 mg/dL Final   • VLDL Cholesterol 11/19/2019 14.4  mg/dL Final   • LDL/HDL Ratio 11/19/2019 2.44  0.00 - 3.55 Final   • TSH 11/19/2019 1.410  0.270 - 4.200 uIU/mL Final   • PSA 11/19/2019 3.870  0.000 - 4.000 ng/mL Final   • WBC 11/19/2019 5.01  3.40 - 10.80 10*3/mm3 Final   • RBC 11/19/2019 4.70  4.14 - 5.80 10*6/mm3 Final   • Hemoglobin 11/19/2019 14.4  13.0 - 17.7 g/dL Final   • Hematocrit 11/19/2019 43.2  37.5 - 51.0 % Final   • MCV 11/19/2019 91.9  79.0 - 97.0 fL Final   • MCH 11/19/2019 30.6  26.6 - 33.0 pg Final   • MCHC 11/19/2019 33.3  31.5 - 35.7 g/dL Final   • RDW 11/19/2019 13.7  12.3 - 15.4 % Final   • RDW-SD 11/19/2019 45.4  37.0 - 54.0 fl Final   • MPV 11/19/2019 10.5  6.0 - 12.0 fL Final   • Platelets 11/19/2019 169  140 -  450 10*3/mm3 Final   • Neutrophil % 11/19/2019 66.6  42.7 - 76.0 % Final   • Lymphocyte % 11/19/2019 21.0  19.6 - 45.3 % Final   • Monocyte % 11/19/2019 10.0  5.0 - 12.0 % Final   • Eosinophil % 11/19/2019 1.8  0.3 - 6.2 % Final   • Basophil % 11/19/2019 0.6  0.0 - 1.5 % Final   • Neutrophils, Absolute 11/19/2019 3.34  1.70 - 7.00 10*3/mm3 Final   • Lymphocytes, Absolute 11/19/2019 1.05  0.70 - 3.10 10*3/mm3 Final   • Monocytes, Absolute 11/19/2019 0.50  0.10 - 0.90 10*3/mm3 Final   • Eosinophils, Absolute 11/19/2019 0.09  0.00 - 0.40 10*3/mm3 Final   • Basophils, Absolute 11/19/2019 0.03  0.00 - 0.20 10*3/mm3 Final   ]

## 2020-02-05 RX ORDER — BISOPROLOL FUMARATE 5 MG/1
TABLET, FILM COATED ORAL
Qty: 45 TABLET | Refills: 4 | Status: SHIPPED | OUTPATIENT
Start: 2020-02-05 | End: 2021-05-03 | Stop reason: SDUPTHER

## 2020-06-10 DIAGNOSIS — R50.9 FEVER, UNSPECIFIED FEVER CAUSE: Primary | ICD-10-CM

## 2020-06-10 DIAGNOSIS — R68.83 CHILLS: ICD-10-CM

## 2020-06-12 ENCOUNTER — OFFICE VISIT (OUTPATIENT)
Dept: FAMILY MEDICINE CLINIC | Facility: CLINIC | Age: 60
End: 2020-06-12
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
## 2020-06-12 DIAGNOSIS — R11.10 VOMITING, INTRACTABILITY OF VOMITING NOT SPECIFIED, PRESENCE OF NAUSEA NOT SPECIFIED, UNSPECIFIED VOMITING TYPE: ICD-10-CM

## 2020-06-12 DIAGNOSIS — R50.9 FEVER, UNSPECIFIED FEVER CAUSE: ICD-10-CM

## 2020-06-12 DIAGNOSIS — R68.83 CHILLS: ICD-10-CM

## 2020-06-12 PROCEDURE — 99441 PR PHYS/QHP TELEPHONE EVALUATION 5-10 MIN: CPT | Performed by: NURSE PRACTITIONER

## 2020-06-28 PROBLEM — R50.9 FEVER: Status: ACTIVE | Noted: 2020-06-28

## 2020-06-28 PROBLEM — R11.10 VOMITING: Status: ACTIVE | Noted: 2020-06-28

## 2020-06-28 PROBLEM — R68.83 CHILLS: Status: ACTIVE | Noted: 2020-06-28
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
## 2020-06-28 NOTE — PROGRESS NOTES
Subjective   Rashaun Quintanilla is a 60 y.o. male who presents to the office complaining of chills and fever that started on Tuesday night.  Tested NEGATIVE for coronavirus.  Tells me he feels weak.  Denies chest pain, shortness of breath.  Denies nausea, diarrhea.  Has experienced vomiting but denies current.  Denies nasal drainage, no abnormal cough.  Denies abd pain.  Denies rash.  Requesting alpha gal and tick titers drawn.  You have chosen to receive care through a telephone visit. Do you consent to use a telephone visit for your medical care today? Yes  I have spent 10 minutes in discussion with this patient.  History of Present Illness     The following portions of the patient's history were reviewed and updated as appropriate: allergies, current medications, past family history, past medical history, past social history, past surgical history and problem list.    Review of Systems   Constitutional: Positive for chills and fever. Negative for fatigue.   HENT: Negative for congestion, sneezing, sore throat and trouble swallowing.    Eyes: Negative for visual disturbance.   Respiratory: Negative for cough, chest tightness, shortness of breath and wheezing.    Cardiovascular: Negative for chest pain, palpitations and leg swelling.   Gastrointestinal: Positive for vomiting. Negative for abdominal pain, constipation, diarrhea and nausea.   Genitourinary: Negative for dysuria, frequency and urgency.   Musculoskeletal: Negative for neck pain.   Skin: Negative for rash.   Neurological: Negative for dizziness, weakness and headaches.   Psychiatric/Behavioral:        In the past two weeks the pt has not felt down, depressed, hopeless or lost interest in doing things   All other systems reviewed and are negative.      Objective   Physical Exam   Constitutional: No distress.   Pulmonary/Chest: Effort normal.   Neurological: He is alert.       Assessment/Plan   Diagnoses and all orders for this visit:    Tick bite, initial  encounter  -     Alpha - Gal Panel; Future  -     Lyme, Total Antibody Test / Reflex; Future  -     [place] SF (IgG / M); Future  -     Ehrlichia Chaffeensis PCR; Future    Fever, unspecified fever cause  -     Alpha - Gal Panel; Future  -     Lyme, Total Antibody Test / Reflex; Future  -     [place] SF (IgG / M); Future  -     Ehrlichia Chaffeensis PCR; Future    Chills  -     Alpha - Gal Panel; Future  -     Lyme, Total Antibody Test / Reflex; Future  -     [place] SF (IgG / M); Future  -     Ehrlichia Chaffeensis PCR; Future    Vomiting, intractability of vomiting not specified, presence of nausea not specified, unspecified vomiting type  -     Alpha - Gal Panel; Future  -     Lyme, Total Antibody Test / Reflex; Future  -     [place] SF (IgG / M); Future  -     Ehrlichia Chaffeensis PCR; Future    Encouraged to continue his medications as ordered.  Good handwashing.  Monitor skin for any rashes.  Monitor temperature.  Will notify of labs once resulted.

## 2020-10-29 RX ORDER — VALSARTAN AND HYDROCHLOROTHIAZIDE 320; 25 MG/1; MG/1
TABLET, FILM COATED ORAL
Qty: 90 TABLET | Refills: 3 | OUTPATIENT
Start: 2020-10-29

## 2020-11-23 DIAGNOSIS — R32 URINARY INCONTINENCE, UNSPECIFIED TYPE: Primary | ICD-10-CM

## 2020-11-25 ENCOUNTER — LAB (OUTPATIENT)
Dept: LAB | Facility: OTHER | Age: 60
End: 2020-11-25

## 2020-11-25 DIAGNOSIS — Z12.5 SPECIAL SCREENING FOR MALIGNANT NEOPLASM OF PROSTATE: ICD-10-CM

## 2020-11-25 DIAGNOSIS — R73.01 IMPAIRED FASTING GLUCOSE: Chronic | ICD-10-CM

## 2020-11-25 DIAGNOSIS — E78.2 MIXED HYPERLIPIDEMIA: Chronic | ICD-10-CM

## 2020-11-25 DIAGNOSIS — I10 ESSENTIAL HYPERTENSION: Chronic | ICD-10-CM

## 2020-11-25 LAB
ALBUMIN SERPL-MCNC: 4.1 G/DL (ref 3.5–5)
ALBUMIN/GLOB SERPL: 1.6 G/DL (ref 1.1–1.8)
ALP SERPL-CCNC: 56 U/L (ref 38–126)
ALT SERPL W P-5'-P-CCNC: 42 U/L
ANION GAP SERPL CALCULATED.3IONS-SCNC: 6 MMOL/L (ref 5–15)
AST SERPL-CCNC: 39 U/L (ref 17–59)
BASOPHILS # BLD AUTO: 0.05 10*3/MM3 (ref 0–0.2)
BASOPHILS NFR BLD AUTO: 1.1 % (ref 0–1.5)
BILIRUB SERPL-MCNC: 2.1 MG/DL (ref 0.2–1.3)
BILIRUB UR QL STRIP: NEGATIVE
BUN SERPL-MCNC: 17 MG/DL (ref 7–23)
BUN/CREAT SERPL: 14.9 (ref 7–25)
CALCIUM SPEC-SCNC: 10 MG/DL (ref 8.4–10.2)
CHLORIDE SERPL-SCNC: 103 MMOL/L (ref 101–112)
CHOLEST SERPL-MCNC: 109 MG/DL (ref 150–200)
CLARITY UR: CLEAR
CO2 SERPL-SCNC: 31 MMOL/L (ref 22–30)
COLOR UR: YELLOW
CREAT SERPL-MCNC: 1.14 MG/DL (ref 0.7–1.3)
DEPRECATED RDW RBC AUTO: 45.6 FL (ref 37–54)
EOSINOPHIL # BLD AUTO: 0.14 10*3/MM3 (ref 0–0.4)
EOSINOPHIL NFR BLD AUTO: 3.2 % (ref 0.3–6.2)
ERYTHROCYTE [DISTWIDTH] IN BLOOD BY AUTOMATED COUNT: 13.7 % (ref 12.3–15.4)
GFR SERPL CREATININE-BSD FRML MDRD: 65 ML/MIN/1.73 (ref 49–113)
GLOBULIN UR ELPH-MCNC: 2.5 GM/DL (ref 2.3–3.5)
GLUCOSE SERPL-MCNC: 109 MG/DL (ref 70–99)
GLUCOSE UR STRIP-MCNC: NEGATIVE MG/DL
HCT VFR BLD AUTO: 42.6 % (ref 37.5–51)
HDLC SERPL-MCNC: 36 MG/DL (ref 40–59)
HGB BLD-MCNC: 14 G/DL (ref 13–17.7)
HGB UR QL STRIP.AUTO: NEGATIVE
KETONES UR QL STRIP: NEGATIVE
LDLC SERPL CALC-MCNC: 59 MG/DL
LDLC/HDLC SERPL: 1.67 {RATIO} (ref 0–3.55)
LEUKOCYTE ESTERASE UR QL STRIP.AUTO: NEGATIVE
LYMPHOCYTES # BLD AUTO: 1.14 10*3/MM3 (ref 0.7–3.1)
LYMPHOCYTES NFR BLD AUTO: 25.9 % (ref 19.6–45.3)
MCH RBC QN AUTO: 30.5 PG (ref 26.6–33)
MCHC RBC AUTO-ENTMCNC: 32.9 G/DL (ref 31.5–35.7)
MCV RBC AUTO: 92.8 FL (ref 79–97)
MONOCYTES # BLD AUTO: 0.46 10*3/MM3 (ref 0.1–0.9)
MONOCYTES NFR BLD AUTO: 10.4 % (ref 5–12)
NEUTROPHILS NFR BLD AUTO: 2.62 10*3/MM3 (ref 1.7–7)
NEUTROPHILS NFR BLD AUTO: 59.4 % (ref 42.7–76)
NITRITE UR QL STRIP: NEGATIVE
PH UR STRIP.AUTO: 7 [PH] (ref 5.5–8)
PLATELET # BLD AUTO: 138 10*3/MM3 (ref 140–450)
PMV BLD AUTO: 10.7 FL (ref 6–12)
POTASSIUM SERPL-SCNC: 3.5 MMOL/L (ref 3.4–5)
PROT SERPL-MCNC: 6.6 G/DL (ref 6.3–8.6)
PROT UR QL STRIP: NEGATIVE
RBC # BLD AUTO: 4.59 10*6/MM3 (ref 4.14–5.8)
SODIUM SERPL-SCNC: 140 MMOL/L (ref 137–145)
SP GR UR STRIP: 1.02 (ref 1–1.03)
TRIGL SERPL-MCNC: 65 MG/DL
UROBILINOGEN UR QL STRIP: NORMAL
VLDLC SERPL-MCNC: 14 MG/DL (ref 5–40)
WBC # BLD AUTO: 4.41 10*3/MM3 (ref 3.4–10.8)

## 2020-11-25 PROCEDURE — 83036 HEMOGLOBIN GLYCOSYLATED A1C: CPT | Performed by: INTERNAL MEDICINE

## 2020-11-25 PROCEDURE — 84443 ASSAY THYROID STIM HORMONE: CPT | Performed by: INTERNAL MEDICINE

## 2020-11-25 PROCEDURE — G0103 PSA SCREENING: HCPCS | Performed by: INTERNAL MEDICINE

## 2020-11-25 PROCEDURE — 36415 COLL VENOUS BLD VENIPUNCTURE: CPT | Performed by: INTERNAL MEDICINE

## 2020-11-25 PROCEDURE — 81003 URINALYSIS AUTO W/O SCOPE: CPT | Performed by: INTERNAL MEDICINE

## 2020-11-25 PROCEDURE — 80061 LIPID PANEL: CPT | Performed by: INTERNAL MEDICINE

## 2020-11-25 PROCEDURE — 85025 COMPLETE CBC W/AUTO DIFF WBC: CPT | Performed by: INTERNAL MEDICINE

## 2020-11-25 PROCEDURE — 80053 COMPREHEN METABOLIC PANEL: CPT | Performed by: INTERNAL MEDICINE

## 2020-11-26 LAB
HBA1C MFR BLD: 5.5 % (ref 4.8–5.6)
PSA SERPL-MCNC: 4.13 NG/ML (ref 0–4)
TSH SERPL DL<=0.05 MIU/L-ACNC: 1.29 UIU/ML (ref 0.27–4.2)

## 2020-12-07 RX ORDER — OMEPRAZOLE 40 MG/1
CAPSULE, DELAYED RELEASE ORAL
Qty: 90 CAPSULE | Refills: 3 | Status: SHIPPED | OUTPATIENT
Start: 2020-12-07 | End: 2021-07-08 | Stop reason: ALTCHOICE

## 2020-12-15 ENCOUNTER — OFFICE VISIT (OUTPATIENT)
Dept: FAMILY MEDICINE CLINIC | Facility: CLINIC | Age: 60
End: 2020-12-15

## 2020-12-15 VITALS
SYSTOLIC BLOOD PRESSURE: 125 MMHG | BODY MASS INDEX: 27.57 KG/M2 | WEIGHT: 208 LBS | HEIGHT: 73 IN | HEART RATE: 64 BPM | DIASTOLIC BLOOD PRESSURE: 74 MMHG

## 2020-12-15 DIAGNOSIS — E78.1 HYPERTRIGLYCERIDEMIA: Chronic | ICD-10-CM

## 2020-12-15 DIAGNOSIS — D69.6 THROMBOCYTOPENIA (HCC): Chronic | ICD-10-CM

## 2020-12-15 DIAGNOSIS — Z12.5 SPECIAL SCREENING FOR MALIGNANT NEOPLASM OF PROSTATE: ICD-10-CM

## 2020-12-15 DIAGNOSIS — N40.1 BENIGN NON-NODULAR PROSTATIC HYPERPLASIA WITH LOWER URINARY TRACT SYMPTOMS: Chronic | ICD-10-CM

## 2020-12-15 DIAGNOSIS — R97.20 ELEVATED PSA, LESS THAN 10 NG/ML: Chronic | ICD-10-CM

## 2020-12-15 DIAGNOSIS — R32 ENURESIS: ICD-10-CM

## 2020-12-15 DIAGNOSIS — E78.2 MIXED HYPERLIPIDEMIA: Primary | Chronic | ICD-10-CM

## 2020-12-15 DIAGNOSIS — Z11.59 ENCOUNTER FOR HEPATITIS C SCREENING TEST FOR LOW RISK PATIENT: ICD-10-CM

## 2020-12-15 DIAGNOSIS — I10 ESSENTIAL HYPERTENSION: Chronic | ICD-10-CM

## 2020-12-15 DIAGNOSIS — K21.9 GASTROESOPHAGEAL REFLUX DISEASE WITHOUT ESOPHAGITIS: Chronic | ICD-10-CM

## 2020-12-15 DIAGNOSIS — R73.01 IMPAIRED FASTING GLUCOSE: Chronic | ICD-10-CM

## 2020-12-15 PROCEDURE — 99443 PR PHYS/QHP TELEPHONE EVALUATION 21-30 MIN: CPT | Performed by: INTERNAL MEDICINE

## 2020-12-15 RX ORDER — MELOXICAM 15 MG/1
15 TABLET ORAL DAILY PRN
Qty: 90 TABLET | Refills: 3 | Status: SHIPPED | OUTPATIENT
Start: 2020-12-15 | End: 2021-08-05

## 2020-12-15 RX ORDER — VALSARTAN AND HYDROCHLOROTHIAZIDE 320; 25 MG/1; MG/1
1 TABLET, FILM COATED ORAL DAILY
Qty: 90 TABLET | Refills: 3 | Status: SHIPPED | OUTPATIENT
Start: 2020-12-15 | End: 2021-05-03 | Stop reason: SDUPTHER

## 2020-12-15 RX ORDER — TAMSULOSIN HYDROCHLORIDE 0.4 MG/1
1 CAPSULE ORAL
Qty: 90 CAPSULE | Refills: 3 | Status: SHIPPED | OUTPATIENT
Start: 2020-12-15 | End: 2021-08-12

## 2020-12-15 RX ORDER — AMLODIPINE BESYLATE 10 MG/1
10 TABLET ORAL DAILY
COMMUNITY
Start: 2020-06-30 | End: 2021-05-03 | Stop reason: SDUPTHER

## 2020-12-15 RX ORDER — HYDRALAZINE HYDROCHLORIDE 25 MG/1
12.5 TABLET, FILM COATED ORAL 2 TIMES DAILY
Qty: 90 TABLET | Refills: 3 | Status: SHIPPED | OUTPATIENT
Start: 2020-12-15 | End: 2021-10-29 | Stop reason: SDUPTHER

## 2020-12-15 NOTE — PATIENT INSTRUCTIONS
Exercising to Lose Weight  Exercise is structured, repetitive physical activity to improve fitness and health. Getting regular exercise is important for everyone. It is especially important if you are overweight. Being overweight increases your risk of heart disease, stroke, diabetes, high blood pressure, and several types of cancer. Reducing your calorie intake and exercising can help you lose weight.  Exercise is usually categorized as moderate or vigorous intensity. To lose weight, most people need to do a certain amount of moderate-intensity or vigorous-intensity exercise each week.  Moderate-intensity exercise    Moderate-intensity exercise is any activity that gets you moving enough to burn at least three times more energy (calories) than if you were sitting.  Examples of moderate exercise include:  · Walking a mile in 15 minutes.  · Doing light yard work.  · Biking at an easy pace.  Most people should get at least 150 minutes (2 hours and 30 minutes) a week of moderate-intensity exercise to maintain their body weight.  Vigorous-intensity exercise  Vigorous-intensity exercise is any activity that gets you moving enough to burn at least six times more calories than if you were sitting. When you exercise at this intensity, you should be working hard enough that you are not able to carry on a conversation.  Examples of vigorous exercise include:  · Running.  · Playing a team sport, such as football, basketball, and soccer.  · Jumping rope.  Most people should get at least 75 minutes (1 hour and 15 minutes) a week of vigorous-intensity exercise to maintain their body weight.  How can exercise affect me?  When you exercise enough to burn more calories than you eat, you lose weight. Exercise also reduces body fat and builds muscle. The more muscle you have, the more calories you burn. Exercise also:  · Improves mood.  · Reduces stress and tension.  · Improves your overall fitness, flexibility, and  endurance.  · Increases bone strength.  The amount of exercise you need to lose weight depends on:  · Your age.  · The type of exercise.  · Any health conditions you have.  · Your overall physical ability.  Talk to your health care provider about how much exercise you need and what types of activities are safe for you.  What actions can I take to lose weight?  Nutrition    · Make changes to your diet as told by your health care provider or diet and nutrition specialist (dietitian). This may include:  ? Eating fewer calories.  ? Eating more protein.  ? Eating less unhealthy fats.  ? Eating a diet that includes fresh fruits and vegetables, whole grains, low-fat dairy products, and lean protein.  ? Avoiding foods with added fat, salt, and sugar.  · Drink plenty of water while you exercise to prevent dehydration or heat stroke.  Activity  · Choose an activity that you enjoy and set realistic goals. Your health care provider can help you make an exercise plan that works for you.  · Exercise at a moderate or vigorous intensity most days of the week.  ? The intensity of exercise may vary from person to person. You can tell how intense a workout is for you by paying attention to your breathing and heartbeat. Most people will notice their breathing and heartbeat get faster with more intense exercise.  · Do resistance training twice each week, such as:  ? Push-ups.  ? Sit-ups.  ? Lifting weights.  ? Using resistance bands.  · Getting short amounts of exercise can be just as helpful as long structured periods of exercise. If you have trouble finding time to exercise, try to include exercise in your daily routine.  ? Get up, stretch, and walk around every 30 minutes throughout the day.  ? Go for a walk during your lunch break.  ? Park your car farther away from your destination.  ? If you take public transportation, get off one stop early and walk the rest of the way.  ? Make phone calls while standing up and walking  around.  ? Take the stairs instead of elevators or escalators.  · Wear comfortable clothes and shoes with good support.  · Do not exercise so much that you hurt yourself, feel dizzy, or get very short of breath.  Where to find more information  · [place] Department of Health and Human Services: www.hhs.gov  · Centers for Disease Control and Prevention (CDC): www.cdc.gov  Contact a health care provider:  · Before starting a new exercise program.  · If you have questions or concerns about your weight.  · If you have a medical problem that keeps you from exercising.  Get help right away if you have any of the following while exercising:  · Injury.  · Dizziness.  · Difficulty breathing or shortness of breath that does not go away when you stop exercising.  · Chest pain.  · Rapid heartbeat.  Summary  · Being overweight increases your risk of heart disease, stroke, diabetes, high blood pressure, and several types of cancer.  · Losing weight happens when you burn more calories than you eat.  · Reducing the amount of calories you eat in addition to getting regular moderate or vigorous exercise each week helps you lose weight.  This information is not intended to replace advice given to you by your health care provider. Make sure you discuss any questions you have with your health care provider.  Document Revised: 12/31/2018 Document Reviewed: 12/31/2018  [employer]vier Patient Education © 2020 [employer]      Calorie Counting for Weight Loss  Calories are units of energy. Your body needs a certain amount of calories from food to keep you going throughout the day. When you eat more calories than your body needs, your body stores the extra calories as fat. When you eat fewer calories than your body needs, your body burns fat to get the energy it needs.  Calorie counting means keeping track of how many calories you eat and drink each day. Calorie counting can be helpful if you need to lose weight. If you make sure to eat fewer  calories than your body needs, you should lose weight. Ask your health care provider what a healthy weight is for you.  For calorie counting to work, you will need to eat the right number of calories in a day in order to lose a healthy amount of weight per week. A dietitian can help you determine how many calories you need in a day and will give you suggestions on how to reach your calorie goal.  · A healthy amount of weight to lose per week is usually 1-2 lb (0.5-0.9 kg). This usually means that your daily calorie intake should be reduced by 500-750 calories.  · Eating 1,200 - 1,500 calories per day can help most women lose weight.  · Eating 1,500 - 1,800 calories per day can help most men lose weight.  What is my plan?  My goal is to have __________ calories per day.  If I have this many calories per day, I should lose around __________ pounds per week.  What do I need to know about calorie counting?  In order to meet your daily calorie goal, you will need to:  · Find out how many calories are in each food you would like to eat. Try to do this before you eat.  · Decide how much of the food you plan to eat.  · Write down what you ate and how many calories it had. Doing this is called keeping a food log.  To successfully lose weight, it is important to balance calorie counting with a healthy lifestyle that includes regular activity. Aim for 150 minutes of moderate exercise (such as walking) or 75 minutes of vigorous exercise (such as running) each week.  Where do I find calorie information?    The number of calories in a food can be found on a Nutrition Facts label. If a food does not have a Nutrition Facts label, try to look up the calories online or ask your dietitian for help.  Remember that calories are listed per serving. If you choose to have more than one serving of a food, you will have to multiply the calories per serving by the amount of servings you plan to eat. For example, the label on a package of  "bread might say that a serving size is 1 slice and that there are 90 calories in a serving. If you eat 1 slice, you will have eaten 90 calories. If you eat 2 slices, you will have eaten 180 calories.  How do I keep a food log?  Immediately after each meal, record the following information in your food log:  · What you ate. Don't forget to include toppings, sauces, and other extras on the food.  · How much you ate. This can be measured in cups, ounces, or number of items.  · How many calories each food and drink had.  · The total number of calories in the meal.  Keep your food log near you, such as in a small notebook in your pocket, or use a mobile marcus or website. Some programs will calculate calories for you and show you how many calories you have left for the day to meet your goal.  What are some calorie counting tips?    · Use your calories on foods and drinks that will fill you up and not leave you hungry:  ? Some examples of foods that fill you up are nuts and nut butters, vegetables, lean proteins, and high-fiber foods like whole grains. High-fiber foods are foods with more than 5 g fiber per serving.  ? Drinks such as sodas, specialty coffee drinks, alcohol, and juices have a lot of calories, yet do not fill you up.  · Eat nutritious foods and avoid empty calories. Empty calories are calories you get from foods or beverages that do not have many vitamins or protein, such as candy, sweets, and soda. It is better to have a nutritious high-calorie food (such as an avocado) than a food with few nutrients (such as a bag of chips).  · Know how many calories are in the foods you eat most often. This will help you calculate calorie counts faster.  · Pay attention to calories in drinks. Low-calorie drinks include water and unsweetened drinks.  · Pay attention to nutrition labels for \"low fat\" or \"fat free\" foods. These foods sometimes have the same amount of calories or more calories than the full fat versions. They " also often have added sugar, starch, or salt, to make up for flavor that was removed with the fat.  · Find a way of tracking calories that works for you. Get creative. Try different apps or programs if writing down calories does not work for you.  What are some portion control tips?  · Know how many calories are in a serving. This will help you know how many servings of a certain food you can have.  · Use a measuring cup to measure serving sizes. You could also try weighing out portions on a kitchen scale. With time, you will be able to estimate serving sizes for some foods.  · Take some time to put servings of different foods on your favorite plates, bowls, and cups so you know what a serving looks like.  · Try not to eat straight from a bag or box. Doing this can lead to overeating. Put the amount you would like to eat in a cup or on a plate to make sure you are eating the right portion.  · Use smaller plates, glasses, and bowls to prevent overeating.  · Try not to multitask (for example, watch TV or use your computer) while eating. If it is time to eat, sit down at a table and enjoy your food. This will help you to know when you are full. It will also help you to be aware of what you are eating and how much you are eating.  What are tips for following this plan?  Reading food labels  · Check the calorie count compared to the serving size. The serving size may be smaller than what you are used to eating.  · Check the source of the calories. Make sure the food you are eating is high in vitamins and protein and low in saturated and trans fats.  Shopping  · Read nutrition labels while you shop. This will help you make healthy decisions before you decide to purchase your food.  · Make a grocery list and stick to it.  Cooking  · Try to cook your favorite foods in a healthier way. For example, try baking instead of frying.  · Use low-fat dairy products.  Meal planning  · Use more fruits and vegetables. Half of your  "plate should be fruits and vegetables.  · Include lean proteins like poultry and fish.  How do I count calories when eating out?  · Ask for smaller portion sizes.  · Consider sharing an entree and sides instead of getting your own entree.  · If you get your own entree, eat only half. Ask for a box at the beginning of your meal and put the rest of your entree in it so you are not tempted to eat it.  · If calories are listed on the menu, choose the lower calorie options.  · Choose dishes that include vegetables, fruits, whole grains, low-fat dairy products, and lean protein.  · Choose items that are boiled, broiled, grilled, or steamed. Stay away from items that are buttered, battered, fried, or served with cream sauce. Items labeled \"crispy\" are usually fried, unless stated otherwise.  · Choose water, low-fat milk, unsweetened iced tea, or other drinks without added sugar. If you want an alcoholic beverage, choose a lower calorie option such as a glass of wine or light beer.  · Ask for dressings, sauces, and syrups on the side. These are usually high in calories, so you should limit the amount you eat.  · If you want a salad, choose a garden salad and ask for grilled meats. Avoid extra toppings like coon, cheese, or fried items. Ask for the dressing on the side, or ask for olive oil and vinegar or lemon to use as dressing.  · Estimate how many servings of a food you are given. For example, a serving of cooked rice is ½ cup or about the size of half a baseball. Knowing serving sizes will help you be aware of how much food you are eating at restaurants. The list below tells you how big or small some common portion sizes are based on everyday objects:  ? 1 oz--4 stacked dice.  ? 3 oz--1 deck of cards.  ? 1 tsp--1 die.  ? 1 Tbsp--½ a ping-pong ball.  ? 2 Tbsp--1 ping-pong ball.  ? ½ cup--½ baseball.  ? 1 cup--1 baseball.  Summary  · Calorie counting means keeping track of how many calories you eat and drink each day. If " you eat fewer calories than your body needs, you should lose weight.  · A healthy amount of weight to lose per week is usually 1-2 lb (0.5-0.9 kg). This usually means reducing your daily calorie intake by 500-750 calories.  · The number of calories in a food can be found on a Nutrition Facts label. If a food does not have a Nutrition Facts label, try to look up the calories online or ask your dietitian for help.  · Use your calories on foods and drinks that will fill you up, and not on foods and drinks that will leave you hungry.  · Use smaller plates, glasses, and bowls to prevent overeating.  This information is not intended to replace advice given to you by your health care provider. Make sure you discuss any questions you have with your health care provider.  Document Revised: 09/06/2019 Document Reviewed: 11/17/2017  [employer] Patient Education © 2020 [employer]

## 2020-12-15 NOTE — PROGRESS NOTES
Subjective      You have chosen to receive care through a telephone visit. Do you consent to use a telephone visit for your medical care today? Yes    Total visit time:  22 minutes.     History of Present Illness     Rashaun Quintanilla is a 60 y.o. male who receives care via telephone visit for annual follow up on hypertension, dyslipidemia, impaired fasting glucose, and GERD among other issues.  He continues to social distance and take all precautions to help decrease risk of exposure to COVID.      PSA continues to gradually increase at 4.1 increased from 3.8 one year ago.  He has a family history of prostate cancer in paternal uncle.  He is typically having 2-4 episodes of nocturia nightly making it difficult to get back to sleep on the nights he is up more frequently. In further discussion, he is also reporting enuresis, waking with wet bed on average once weekly.  He has found limiting fluid intake in the evening does help with the bladder incontinence. I recommended starting Flomax and referral to urology.  He is in agreement.  He had normal colonoscopy by Dr. Garcia on 07/30/2018 with 10-year repeat recommended.      Labs continue to reveal impaired fasting glucose, although, not progressed.  He reports a 10-pound weight loss with following a lower carbohydrate diet and decreasing snacking and not eating as many restaurant meals.   Cholesterol also lower with improved dietary efforts.     Blood pressure at goal today with Lotrel, Diovan and hydralazine 25 mg, which is he is only taking once daily.  He reports some of his readings are running low, although, he feels this may be due to using a wrist cuff, which is not always accurate.  He does have occasional orthostatic symptoms.       He had influenza vaccine at [employer] fair at his place of employment October 28th, 2020.         The patient's relevant past medical, surgical, and social history was reviewed in Epic.   Lab results are reviewed with the patient  "today.  CBC unremarkable other than mild thrombocytopenia with platelets 138,000. Fasting glucose 109.  A1c 5.5.  Total cholesterol 109.  HDL 36.  LDL 59.  Triglycerides 65.  LDL/HDL ratio 1.67.  Normal renal and liver function.  PSA gradually increasing as noted above,     Review of Systems   Constitutional: Negative for chills, fatigue and fever.   HENT: Negative for congestion, ear pain, postnasal drip, sinus pressure and sore throat.    Respiratory: Negative for cough, shortness of breath and wheezing.    Cardiovascular: Negative for chest pain, palpitations and leg swelling.   Gastrointestinal: Negative for abdominal pain, blood in stool, constipation, diarrhea, nausea and vomiting.   Endocrine: Negative for cold intolerance, heat intolerance, polydipsia and polyuria.   Genitourinary: Positive for enuresis. Negative for dysuria, frequency, hematuria and urgency.        Nocturia.    Skin: Negative for rash.   Neurological: Negative for syncope and weakness.      Objective      Visit Vitals  /74   Pulse 64   Ht 185.4 cm (73\")   Wt 94.3 kg (208 lb)   BMI 27.44 kg/m²         Physical Exam      Assessment/Plan      Continue the current multidrug therapy for his hypertension. He is only taking one hydralazine daily, for which I recommended splitting the dose to take 1/2 tablet b.i.d. for more consistent and stable BP control.        I sent a prescription for Flomax and referred to Dr. Coles, urology,  to treat BPH symptoms, mildly elevated PSA and new problem of enuresis in this patient with possible family history of prostate cancer and gradually increasing PSA.  We will continue to monitor the gradual increase in PSA.      We will continue to monitor very mild thrombocytopenia, which is asymptomatic.  Check iron and B12 levels with next set of labs.  Check a hepatitis C screen with next year's labs.     Continue diet control for hyperlipidemia improved with weight loss.     Continue omeprazole and " anti-reflux measures for GERD/gastritis management.     I recommended he continue the low carbohydrate diet, exercise and weight loss efforts to delay progression of impaired fasting glucose.  Written literature regarding diet and exercise included in patient's AVS today.       Return in one year for annual exam with fasting labs one week prior or sooner if needed.       Scribed for Dr. Hammonds by [facility].     Diagnoses and all orders for this visit:    Mixed hyperlipidemia  -     CBC Auto Differential; Future  -     Comprehensive Metabolic Panel; Future  -     Lipid Panel; Future    Essential hypertension  -     Comprehensive Metabolic Panel; Future    Hypertriglyceridemia  -     Lipid Panel; Future  -     TSH; Future    Impaired fasting glucose  -     Comprehensive Metabolic Panel; Future  -     Hemoglobin A1c; Future    Gastroesophageal reflux disease without esophagitis    Benign non-nodular prostatic hyperplasia with lower urinary tract symptoms  -     Ambulatory Referral to Urology    Thrombocytopenia (CMS/HCC) - mild, noted 2020  -     Ferritin; Future  -     Vitamin B12; Future  -     Hepatitis C Antibody; Future    Enuresis  -     Ambulatory Referral to Urology    Elevated PSA, less than 10 ng/ml - Very mild, 4.1, 12/2020    Special screening for malignant neoplasm of prostate  -     PSA Screen; Future    Encounter for hepatitis C screening test for low risk patient  -     Hepatitis C Antibody; Future    Other orders  -     amLODIPine (NORVASC) 10 MG tablet; Take 10 mg by mouth Daily.  -     hydrALAZINE (APRESOLINE) 25 MG tablet; Take 0.5 tablets by mouth 2 (two) times a day.  -     meloxicam (MOBIC) 15 MG tablet; Take 1 tablet by mouth Daily As Needed (Take with food.).  -     valsartan-hydrochlorothiazide (DIOVAN-HCT) 320-25 MG per tablet; Take 1 tablet by mouth Daily.  -     tamsulosin (FLOMAX) 0.4 MG capsule 24 hr capsule; Take 1 capsule by mouth every night at bedtime.        Lab on  11/25/2020   Component Date Value Ref Range Status   • WBC 11/25/2020 4.41  3.40 - 10.80 10*3/mm3 Final   • RBC 11/25/2020 4.59  4.14 - 5.80 10*6/mm3 Final   • Hemoglobin 11/25/2020 14.0  13.0 - 17.7 g/dL Final   • Hematocrit 11/25/2020 42.6  37.5 - 51.0 % Final   • MCV 11/25/2020 92.8  79.0 - 97.0 fL Final   • MCH 11/25/2020 30.5  26.6 - 33.0 pg Final   • MCHC 11/25/2020 32.9  31.5 - 35.7 g/dL Final   • RDW 11/25/2020 13.7  12.3 - 15.4 % Final   • RDW-SD 11/25/2020 45.6  37.0 - 54.0 fl Final   • MPV 11/25/2020 10.7  6.0 - 12.0 fL Final   • Platelets 11/25/2020 138* 140 - 450 10*3/mm3 Final   • Neutrophil % 11/25/2020 59.4  42.7 - 76.0 % Final   • Lymphocyte % 11/25/2020 25.9  19.6 - 45.3 % Final   • Monocyte % 11/25/2020 10.4  5.0 - 12.0 % Final   • Eosinophil % 11/25/2020 3.2  0.3 - 6.2 % Final   • Basophil % 11/25/2020 1.1  0.0 - 1.5 % Final   • Neutrophils, Absolute 11/25/2020 2.62  1.70 - 7.00 10*3/mm3 Final   • Lymphocytes, Absolute 11/25/2020 1.14  0.70 - 3.10 10*3/mm3 Final   • Monocytes, Absolute 11/25/2020 0.46  0.10 - 0.90 10*3/mm3 Final   • Eosinophils, Absolute 11/25/2020 0.14  0.00 - 0.40 10*3/mm3 Final   • Basophils, Absolute 11/25/2020 0.05  0.00 - 0.20 10*3/mm3 Final   • Glucose 11/25/2020 109* 70 - 99 mg/dL Final   • BUN 11/25/2020 17  7 - 23 mg/dL Final   • Creatinine 11/25/2020 1.14  0.70 - 1.30 mg/dL Final   • Sodium 11/25/2020 140  137 - 145 mmol/L Final   • Potassium 11/25/2020 3.5  3.4 - 5.0 mmol/L Final   • Chloride 11/25/2020 103  101 - 112 mmol/L Final   • CO2 11/25/2020 31.0* 22.0 - 30.0 mmol/L Final   • Calcium 11/25/2020 10.0  8.4 - 10.2 mg/dL Final   • Total Protein 11/25/2020 6.6  6.3 - 8.6 g/dL Final   • Albumin 11/25/2020 4.10  3.50 - 5.00 g/dL Final   • ALT (SGPT) 11/25/2020 42  <=50 U/L Final   • AST (SGOT) 11/25/2020 39  17 - 59 U/L Final   • Alkaline Phosphatase 11/25/2020 56  38 - 126 U/L Final   • Total Bilirubin 11/25/2020 2.1* 0.2 - 1.3 mg/dL Final   • eGFR Non African Amer  11/25/2020 65  49 - 113 mL/min/1.73 Final   • Globulin 11/25/2020 2.5  2.3 - 3.5 gm/dL Final   • A/G Ratio 11/25/2020 1.6  1.1 - 1.8 g/dL Final   • BUN/Creatinine Ratio 11/25/2020 14.9  7.0 - 25.0 Final   • Anion Gap 11/25/2020 6.0  5.0 - 15.0 mmol/L Final   • Hemoglobin A1C 11/25/2020 5.50  4.80 - 5.60 % Final   • Total Cholesterol 11/25/2020 109* 150 - 200 mg/dL Final   • Triglycerides 11/25/2020 65  <=150 mg/dL Final   • HDL Cholesterol 11/25/2020 36* 40 - 59 mg/dL Final   • LDL Cholesterol  11/25/2020 59  <=100 mg/dL Final   • VLDL Cholesterol 11/25/2020 14  5 - 40 mg/dL Final   • LDL/HDL Ratio 11/25/2020 1.67  0.00 - 3.55 Final   • TSH 11/25/2020 1.290  0.270 - 4.200 uIU/mL Final   • PSA 11/25/2020 4.130* 0.000 - 4.000 ng/mL Final   ]

## 2021-02-08 DIAGNOSIS — M72.2 PLANTAR FASCIITIS: Primary | ICD-10-CM

## 2021-02-24 ENCOUNTER — HOSPITAL ENCOUNTER (OUTPATIENT)
Dept: PHYSICAL THERAPY | Facility: HOSPITAL | Age: 61
Setting detail: THERAPIES SERIES
Discharge: HOME | End: 2021-02-24

## 2021-02-24 DIAGNOSIS — M21.41 PES PLANUS OF BOTH FEET: ICD-10-CM

## 2021-02-24 DIAGNOSIS — M72.2 PLANTAR FASCIAL FIBROMATOSIS: Primary | ICD-10-CM

## 2021-02-24 DIAGNOSIS — M21.42 PES PLANUS OF BOTH FEET: ICD-10-CM

## 2021-02-24 PROCEDURE — 97161 PT EVAL LOW COMPLEX 20 MIN: CPT | Performed by: PHYSICAL THERAPIST

## 2021-02-24 NOTE — THERAPY EVALUATION
Outpatient Physical Therapy Ortho Initial Evaluation  [facility]     Patient Name: Rashaun Quintanilla  : 1960  MRN: [id number]  Today's Date: 2021      Visit Date: 2021    Patient Active Problem List   Diagnosis   • Chest pain   • Essential hypertension   • Viral gastroenteritis   • Ventricular premature depolarization   • Sprain of foot, right   • Screening for malignant neoplasm of prostate   • Plantar fasciitis   • Osteoarthritis of knee   • Male erectile disorder   • Lung nodule, solitary   • Lipoma of skin and subcutaneous tissue of neck   • Knee pain   • Joint effusion of the lower leg   • Injury of ankle, left   • Impaired fasting glucose   • Hypertriglyceridemia   • Hyperlipidemia   • H/O peptic ulcer   • GERD (gastroesophageal reflux disease)   • Gastritis   • Foot pain   • Eczema of hand   • Dysuria   • Dyslipidemia   • Cardiomegaly   • Allergic rhinitis   • Abnormal chest x-ray   • Benign non-nodular prostatic hyperplasia with lower urinary tract symptoms   • Vomiting   • Chills   • Fever   • Tick bite   • Thrombocytopenia (CMS/HCC) - mild, noted    • Elevated PSA, less than 10 ng/ml - Very mild, 4.1, 2020        Past Medical History:   Diagnosis Date   • Abnormal chest x-ray    • Allergic rhinitis     Seasonal    • Cardiomegaly     Tendencies towards      • Chest pain    • Dyslipidemia    • Dysuria    • Eczema of hand     Bilateral hands      • Essential hypertension     On multidrug therapy      • Foot pain    • Gastritis    • GERD (gastroesophageal reflux disease)     Change omeprazole to Zantac twice daily, 2016      • H/O peptic ulcer    • Hyperlipidemia     Currently diet controlled      • Hypertriglyceridemia     low HDL      • Impaired fasting glucose     Impaired fasting glycaemia      • Injury of ankle, left     Left ankle sprain      • Joint effusion of the lower leg     Right knee. With episodic pain and occasional instability      • Knee pain     Right. With  episodes of mild medial instability   • Lipoma of skin and subcutaneous tissue of neck      Apparent subepidermal cyst anterior neck. Not infected      • Lung nodule, solitary     Right upper lobe, noted at [facility] urgent care, 5/2016. Chest x-ray repeated by me, 5/16/2016   • Male erectile disorder     exacerbated by HCTZ      • Osteoarthritis of knee     O/A, DJD      • Plantar fasciitis    • Screening for malignant neoplasm of prostate    • Sprain of foot, right    • Ventricular premature depolarization     Unifocal PVCs noted on [facility] screening EKG, summer 2015. started bisoprolol and referred to Dr. Braun, 11/2015.        Past Surgical History:   Procedure Laterality Date   • ENDOSCOPY  11/2008    Colon endoscopy 58530 (1)        Visit Dx:     ICD-10-CM ICD-9-CM   1. Plantar fascial fibromatosis  M72.2 728.71   2. Pes planus of both feet  M21.41 734    M21.42              PT Ortho     Row Name 02/24/21 1430       Subjective Comments    Subjective Comments  Present today with history of bilateral custom orthotics. Reports with orthotics no pain and in need for new orthotics. No issues with skin in past.   -BB       Precautions and Contraindications    Precautions/Limitations  no known precautions/limitations  -BB       Subjective Pain    Able to rate subjective pain?  yes  -BB    Pre-Treatment Pain Level  0  -BB    Post-Treatment Pain Level  0  -BB       Posture/Observations    Posture/Observations Comments  Bilateral pes planus with overpronation noted.   -BB       General ROM    GENERAL ROM COMMENTS  Ankle and toes WFL for normal gait   -BB       MMT (Manual Muscle Testing)    General MMT Comments  WFL for normal gait   -BB       Sensation    Sensation WNL?  WNL  -BB    Light Touch  No apparent deficits  -BB       Transfers    Comment (Transfers)  independent in all   -BB      User Key  (r) = Recorded By, (t) = Taken By, (c) = Cosigned By    Initials Name Provider Type    BB Kaitlin Sears, PT DPT Physical  Therapist                            PT OP Goals     Row Name 02/24/21 1400          PT Short Term Goals    STG Date to Achieve  03/10/21  -BB     STG 1  Patient will be independent in care and wear schedule of orthosis  -BB        Time Calculation    PT Goal Re-Cert Due Date  03/10/21  -BB       User Key  (r) = Recorded By, (t) = Taken By, (c) = Cosigned By    Initials Name Provider Type    Kaitlin Ahumada, PT DPT Physical Therapist          PT Assessment/Plan     Row Name 02/24/21 1430          PT Assessment    Functional Limitations  Impaired gait  -BB     Impairments  Gait;Poor body mechanics  -BB     Assessment Comments  Patient presents today with bilateral pes planus with overpronation with history of custom orthotics that have resolved pains with biomechanical motion control. Patient could continue to benefit from rigid motion control and support. Met head aqua temps to be fabricated as previously made and requested by patient.   -BB     Rehab Potential  Good  -BB     Patient/caregiver participated in establishment of treatment plan and goals  Yes  -BB     Patient would benefit from skilled therapy intervention  Yes  -BB        PT Plan    Predicted Duration of Therapy Intervention (PT)  PRN  -BB     Planned CPT's?  PT EVAL LOW COMPLEXITY: 89695;PT ORTHOTIC MGMT/TRAIN EA 15 MIN: 72232;PT THER SUPP EA 15 MIN  -BB     PT Plan Comments  orthotic checkout and adjustment PRN  -BB       User Key  (r) = Recorded By, (t) = Taken By, (c) = Cosigned By    Initials Name Provider Type    Kaitlin Ahumada, PT DPT Physical Therapist            OP Exercises     Row Name 02/24/21 1430             Subjective Comments    Subjective Comments  Present today with history of bilateral custom orthotics. Reports with orthotics no pain and in need for new orthotics. No issues with skin in past.   -BB         Subjective Pain    Able to rate subjective pain?  yes  -BB      Pre-Treatment Pain Level  0  -BB      Post-Treatment Pain  Level  0  -BB        User Key  (r) = Recorded By, (t) = Taken By, (c) = Cosigned By    Initials Name Provider Type    Kaitlin Ahumada PT DPT Physical Therapist                                  Time Calculation:     Start Time: 1430  Stop Time: 1450  Time Calculation (min): 20 min     Therapy Charges for Today     Code Description Service Date Service Provider Modifiers Qty    [id number]  PT EVAL LOW COMPLEXITY 1 2/24/2021 Kaitlin Sears PT DPT GP 1    [id number]  PT-CUSTOM ORTHOTICS-LEVEL 1 2/24/2021 Kaitlin Sears PT DPT  1                    Kaitlin Sears PT DPT  2/24/2021

## 2021-05-03 RX ORDER — VALSARTAN AND HYDROCHLOROTHIAZIDE 320; 25 MG/1; MG/1
1 TABLET, FILM COATED ORAL DAILY
Qty: 90 TABLET | Refills: 3 | Status: SHIPPED | OUTPATIENT
Start: 2021-05-03 | End: 2021-07-08 | Stop reason: ALTCHOICE

## 2021-05-03 RX ORDER — AMLODIPINE BESYLATE 10 MG/1
10 TABLET ORAL DAILY
Qty: 90 TABLET | Refills: 3 | Status: SHIPPED | OUTPATIENT
Start: 2021-05-03 | End: 2021-08-12 | Stop reason: SDUPTHER

## 2021-05-03 RX ORDER — BISOPROLOL FUMARATE 5 MG/1
TABLET, FILM COATED ORAL
Qty: 45 TABLET | Refills: 3 | Status: SHIPPED | OUTPATIENT
Start: 2021-05-03 | End: 2021-08-12

## 2021-07-06 ENCOUNTER — TELEPHONE (OUTPATIENT)
Dept: FAMILY MEDICINE CLINIC | Facility: CLINIC | Age: 61
End: 2021-07-06

## 2021-07-06 NOTE — TELEPHONE ENCOUNTER
I called patient because Dr. Hammonds wanted to see him if edema and b/p werent improving. He states he has apt with cardiology this week but will call back if he needs an apt. TP

## 2021-07-08 ENCOUNTER — OFFICE VISIT (OUTPATIENT)
Dept: FAMILY MEDICINE CLINIC | Facility: CLINIC | Age: 61
End: 2021-07-08

## 2021-07-08 VITALS
SYSTOLIC BLOOD PRESSURE: 126 MMHG | WEIGHT: 193 LBS | OXYGEN SATURATION: 98 % | DIASTOLIC BLOOD PRESSURE: 84 MMHG | HEART RATE: 67 BPM | BODY MASS INDEX: 25.58 KG/M2 | HEIGHT: 73 IN

## 2021-07-08 DIAGNOSIS — R60.9 EDEMA, UNSPECIFIED TYPE: ICD-10-CM

## 2021-07-08 DIAGNOSIS — K21.9 GASTROESOPHAGEAL REFLUX DISEASE WITHOUT ESOPHAGITIS: Chronic | ICD-10-CM

## 2021-07-08 DIAGNOSIS — E78.1 HYPERTRIGLYCERIDEMIA: ICD-10-CM

## 2021-07-08 DIAGNOSIS — R73.01 IMPAIRED FASTING GLUCOSE: ICD-10-CM

## 2021-07-08 DIAGNOSIS — D64.9 POSTOPERATIVE ANEMIA: ICD-10-CM

## 2021-07-08 DIAGNOSIS — R53.83 FATIGUE, UNSPECIFIED TYPE: ICD-10-CM

## 2021-07-08 DIAGNOSIS — J90 BILATERAL PLEURAL EFFUSION: ICD-10-CM

## 2021-07-08 DIAGNOSIS — E78.2 MIXED HYPERLIPIDEMIA: ICD-10-CM

## 2021-07-08 DIAGNOSIS — I10 ESSENTIAL HYPERTENSION: Chronic | ICD-10-CM

## 2021-07-08 DIAGNOSIS — N18.31 STAGE 3A CHRONIC KIDNEY DISEASE (HCC): Chronic | ICD-10-CM

## 2021-07-08 DIAGNOSIS — Z09 HOSPITAL DISCHARGE FOLLOW-UP: Primary | ICD-10-CM

## 2021-07-08 PROBLEM — C64.1 RENAL CELL CARCINOMA OF RIGHT KIDNEY (HCC): Chronic | Status: ACTIVE | Noted: 2021-07-08

## 2021-07-08 PROCEDURE — 99215 OFFICE O/P EST HI 40 MIN: CPT | Performed by: INTERNAL MEDICINE

## 2021-07-08 RX ORDER — AMIODARONE HYDROCHLORIDE 200 MG/1
TABLET ORAL
COMMUNITY
Start: 2021-06-23 | End: 2021-08-31 | Stop reason: ALTCHOICE

## 2021-07-08 RX ORDER — AMOXICILLIN 250 MG
1 CAPSULE ORAL DAILY
COMMUNITY
End: 2022-05-02

## 2021-07-08 RX ORDER — METOPROLOL SUCCINATE 100 MG/1
100 TABLET, EXTENDED RELEASE ORAL DAILY
COMMUNITY
Start: 2021-06-23 | End: 2023-03-22 | Stop reason: SDUPTHER

## 2021-07-08 RX ORDER — VALSARTAN 80 MG/1
80 TABLET ORAL DAILY
Qty: 90 TABLET | Refills: 3 | COMMUNITY
Start: 2021-07-08 | End: 2021-08-12

## 2021-07-08 RX ORDER — FUROSEMIDE 20 MG/1
20 TABLET ORAL
COMMUNITY
Start: 2021-07-01 | End: 2021-08-05

## 2021-07-08 RX ORDER — FAMOTIDINE 20 MG/1
20 TABLET, FILM COATED ORAL 2 TIMES DAILY
Qty: 180 TABLET | Refills: 3 | Status: SHIPPED | OUTPATIENT
Start: 2021-07-08 | End: 2021-10-29 | Stop reason: SDUPTHER

## 2021-07-08 RX ORDER — FLUTICASONE PROPIONATE 50 MCG
SPRAY, SUSPENSION (ML) NASAL
COMMUNITY
End: 2021-12-29 | Stop reason: SDUPTHER

## 2021-07-08 NOTE — PROGRESS NOTES
Chief Complaint  Follow-up (FU from surgery, had kidney removed, and chest cracked)    Subjective          Rashaun Quintanilla presents to [facility]  History of Present Illness    Rashaun is here for hospital follow-up and to address some acute issues.  To treat recently diagnosed right renal cell carcinoma, the patient underwent right radical nephrectomy at [place].  His case was complicated by massive inferior vena cava thrombosis, successfully addressed with surgical thrombectomy.  His postoperative period was complicated by atrial fibrillation.  He is now back in normal sinus rhythm, but on Eliquis, Toprol-XL and amiodarone.    Upon hospital discharge, they told him not to take his hydralazine or Diovan HCT or Norvasc.  His blood pressure was very elevated with systolics frequently in the 160-170 range.  He was seen for follow-up in the cardiac surgery department on July 1 where he was found to have some persistent bilateral pleural effusions and 3+ bilateral lower extremity edema; therefore, they had him resume Norvasc 10 mg daily.  He also continues on Lasix 20 mg twice daily.  His blood pressure control is very good today.  His edema is vastly improved, now with just trace or 1+ edema in the bilateral lower extremities and also some persistent pedal edema.  He reports having systolic pressures in the 140s to 150s at home for the past few days.  We discussed that it would be preferable to check the accuracy of his home blood pressure monitor before getting much more aggressive with hypertension management.  There is some mild dyspnea on exertion, but no dyspnea at rest.  No PND or orthopnea.    They report that one of his groups of doctors at [place] has just sent a prescription for valsartan 80 mg daily, but they were instructed not to start it yet.  If it is needed, this will replace valsartan HCT while he is on Lasix.  I will go ahead and update his medication list to reflect  "this change.    He reports episodic heartburn/GERD symptoms without dysphagia or melena since being discharged from the hospital.  He was previously on omeprazole each morning, but has been trying to avoid taking the PPI now that he has a unilateral kidney.  We discussed starting Pepcid twice daily.    The surgical team is giving them a positive prognosis.    Objective   Vital Signs:   /84   Pulse 67   Ht 185.4 cm (73\")   Wt 87.5 kg (193 lb)   SpO2 98%   BMI 25.46 kg/m²     Physical Exam  Vitals reviewed.   Constitutional:       General: He is not in acute distress.     Appearance: Normal appearance. He is well-developed. He is not ill-appearing.      Comments: Very pleasant gentleman.  Accompanied by his wife, Nathalie CAVANAUGH:      Head: Normocephalic and atraumatic.      Nose:      Right Sinus: No maxillary sinus tenderness or frontal sinus tenderness.      Left Sinus: No maxillary sinus tenderness or frontal sinus tenderness.      Mouth/Throat:      Mouth: No oral lesions.      Pharynx: Uvula midline.      Tonsils: No tonsillar exudate.   Eyes:      Conjunctiva/sclera: Conjunctivae normal.      Pupils: Pupils are equal, round, and reactive to light.   Neck:      Thyroid: No thyroid mass or thyromegaly.      Vascular: No carotid bruit or JVD.      Trachea: Trachea normal. No tracheal deviation.   Cardiovascular:      Rate and Rhythm: Normal rate and regular rhythm.  No extrasystoles are present.     Chest Wall: PMI is not displaced.      Pulses: Normal pulses.      Heart sounds: Normal heart sounds. No murmur heard.        Comments: CABG excision extends well down into the abdomen.  It is healing nicely with no evidence of wound dehiscence or infection  Pulmonary:      Effort: Pulmonary effort is normal. No accessory muscle usage or respiratory distress.      Breath sounds: Normal breath sounds. No decreased breath sounds, wheezing, rhonchi or rales.      Comments: Mild dullness to percussion at both lung " bases, left more than right.  Abdominal:      General: Bowel sounds are normal. There is no distension.      Palpations: Abdomen is soft.      Tenderness: There is no abdominal tenderness.   Musculoskeletal:      Cervical back: Neck supple.      Right lower leg: Edema present.      Left lower leg: Edema present.      Comments: Trace to 1+ bilateral lower extremity edema including some pedal edema, which they report to be much improved from 1 week ago.   Lymphadenopathy:      Cervical: No cervical adenopathy.   Skin:     General: Skin is warm and dry.      Findings: No rash.      Nails: There is no clubbing.   Neurological:      General: No focal deficit present.      Mental Status: He is alert and oriented to person, place, and time. Mental status is at baseline.      Cranial Nerves: No cranial nerve deficit.      Coordination: Coordination normal.   Psychiatric:         Mood and Affect: Mood normal.         Speech: Speech normal.         Behavior: Behavior normal.         Thought Content: Thought content normal.         Judgment: Judgment normal.        Result Review :     Common labs    [facility] 6/16/21 6/16/21 6/16/21 6/16/21 6/16/21 6/16/21 6/16/21 6/17/21 6/17/21 6/17/21 6/17/21 6/17/21 6/21/21    0736 1730 1730 1730 2156 2156 2156 0224 0224 0224 0931 0931    Glucose  120 (A)   143 (A)   134 (A)        Potassium 3.8  3.8   3.9   4.6  4.5  3.6   Hemoglobin    9.6 (A)   9.8 (A)   8.9 (A)  8.3 (A)    (A) Abnormal value            [facility] 6/16/21 6/16/21 6/16/21 6/17/21 6/17/21 6/21/21    0736 1730 2156 0224 0931    Potassium 3.8 3.8 3.9 4.6 4.5 3.6           Most Recent A1C    HGBA1C Most Recent 5/26/21   Hemoglobin A1C 5.4      Comments are available for some flowsheets but are not being displayed.         Most recent BUN/creatinine  Data reviewed: Recent hospitalization notes [place], 6/2021          Assessment and Plan    Diagnoses and all orders for this visit:    1. Hospital discharge follow-up  (Primary)  -     CBC Auto Differential; Future  -     Comprehensive Metabolic Panel; Future  -     Hemoglobin A1c; Future  -     LDL Cholesterol, Direct; Future  -     Vitamin B12; Future  -     Magnesium; Future    2. Essential hypertension  -     CBC Auto Differential; Future  -     Comprehensive Metabolic Panel; Future  -     Magnesium; Future    3. Edema, unspecified type    4. Bilateral pleural effusion    5. Stage 3a chronic kidney disease (CMS/HCC)  -     Comprehensive Metabolic Panel; Future  -     Magnesium; Future    6. Gastroesophageal reflux disease without esophagitis    7. Impaired fasting glucose  -     Hemoglobin A1c; Future    8. Mixed hyperlipidemia  -     LDL Cholesterol, Direct; Future    9. Hypertriglyceridemia  -     Triglycerides; Future    10. Fatigue, unspecified type  -     Vitamin B12; Future    11. Postoperative anemia  -     Ferritin; Future  -     Folate; Future  -     Iron Profile; Future    Other orders  -     famotidine (PEPCID) 20 MG tablet; Take 1 tablet by mouth 2 (Two) Times a Day.  Dispense: 180 tablet; Refill: 3      Bring home blood pressure cuff in the office tomorrow to check for accuracy, as we are getting a considerably lower reading here today than he has been getting at home.  Pursue sodium restriction.  Drink lots of water to maintain adequate hydration, but avoid excessive hydration due to the current edema and pleural effusions.  Continue monitoring blood pressure and heart rate at home.  Reduce Norvasc to 5 mg daily.  Continue the high-dose Toprol-XL and the low-dose hydralazine twice daily.  I am not going to resume valsartan for now, as his renal function is still not back to baseline and he is still requiring Lasix 20 mg twice daily, but will likely want to try to get him back on some of that in the future.  I have told them that he can take 80mg of valsartan as a rescue medication whenever systolic blood pressure is greater than 150.  Keep legs elevated until the  edema resolves, although it is vastly improved from when he was evaluated 1 week ago at [place].    Clinically, the pleural effusions also seem to be improving and should certainly respond to current management including Lasix.  I defer to CT surgery and cardiology to tell him when to reduce the diuretic and ultimately wean off of it.    Continue to avoid NSAIDs and other nephrotoxic drugs due to now having only the left kidney.  We will monitor renal function closely.  I am going to change his treatment for GERD.  See below.  Stop omeprazole and start Pepcid 20 mg twice daily for GERD, which should be a better choice in this patient with CKD and surgically removed right kidney.  Notify me if this does not provide adequate control of GERD symptoms.  His symptoms should be better at his current lower weight.    I encouraged him to have a goal of trying to keep his body weight less than 190 pounds.  His weight is down 15 pounds from his checkup here last December and his BMI is improved at 25.5.    Prior to surgery, his baseline hemoglobin was approximately 14.  It was less than 9 when he was discharged.  We will recheck CBC and perform an anemia work-up with labs in 3 weeks.  They did not discharge him on any iron.    I reviewed all of these instructions with the patient and also coordinated care with his wife, providing a written summary in the exam room and the patient also received the after visit summary.    Return to clinic in 1 month with nonfasting labs prior.  They will keep all scheduled follow-up appointments with urology and cardiac surgery and cardiology.  Hopefully, the atrial fibrillation will only have been a postoperative complication and he will not require chronic anticoagulation.    Current outpatient and discharge medications have been reconciled for the patient.  Reviewed by: Allen Hammonds MD      I spent 50 minutes caring for Rashaun on this date of service. This time includes time spent by me  in the following activities:preparing for the visit, reviewing tests, obtaining and/or reviewing a separately obtained history, performing a medically appropriate examination and/or evaluation , counseling and educating the patient/family/caregiver, ordering medications, tests, or procedures, documenting information in the medical record, independently interpreting results and communicating that information with the patient/family/caregiver and care coordination  Follow Up   No follow-ups on file.  Patient was given instructions and counseling regarding his condition or for health maintenance advice. Please see specific information pulled into the AVS if appropriate.

## 2021-07-09 ENCOUNTER — TELEPHONE (OUTPATIENT)
Dept: FAMILY MEDICINE CLINIC | Facility: CLINIC | Age: 61
End: 2021-07-09

## 2021-07-09 NOTE — TELEPHONE ENCOUNTER
I checked patients b/p cuff with ours. His read 172/93, ours was 170/80. Dr. Hammonds instructed to take Diovan 80 mg that he has at home already along with other meds. TP

## 2021-08-05 ENCOUNTER — LAB (OUTPATIENT)
Dept: LAB | Facility: OTHER | Age: 61
End: 2021-08-05

## 2021-08-05 DIAGNOSIS — E78.1 HYPERTRIGLYCERIDEMIA: ICD-10-CM

## 2021-08-05 DIAGNOSIS — N18.31 STAGE 3A CHRONIC KIDNEY DISEASE (HCC): Chronic | ICD-10-CM

## 2021-08-05 DIAGNOSIS — R53.83 FATIGUE, UNSPECIFIED TYPE: ICD-10-CM

## 2021-08-05 DIAGNOSIS — R73.01 IMPAIRED FASTING GLUCOSE: ICD-10-CM

## 2021-08-05 DIAGNOSIS — E78.2 MIXED HYPERLIPIDEMIA: ICD-10-CM

## 2021-08-05 DIAGNOSIS — Z09 HOSPITAL DISCHARGE FOLLOW-UP: ICD-10-CM

## 2021-08-05 DIAGNOSIS — D64.9 POSTOPERATIVE ANEMIA: ICD-10-CM

## 2021-08-05 DIAGNOSIS — I10 ESSENTIAL HYPERTENSION: Chronic | ICD-10-CM

## 2021-08-05 LAB
ALBUMIN SERPL-MCNC: 4.3 G/DL (ref 3.5–5)
ALBUMIN/GLOB SERPL: 1.5 G/DL (ref 1.1–1.8)
ALP SERPL-CCNC: 67 U/L (ref 38–126)
ALT SERPL W P-5'-P-CCNC: 20 U/L
ANION GAP SERPL CALCULATED.3IONS-SCNC: 5 MMOL/L (ref 5–15)
ARTICHOKE IGE QN: 62 MG/DL (ref 0–100)
AST SERPL-CCNC: 22 U/L (ref 17–59)
BASOPHILS # BLD AUTO: 0.05 10*3/MM3 (ref 0–0.2)
BASOPHILS NFR BLD AUTO: 1.1 % (ref 0–1.5)
BILIRUB SERPL-MCNC: 0.9 MG/DL (ref 0.2–1.3)
BUN SERPL-MCNC: 19 MG/DL (ref 7–23)
BUN/CREAT SERPL: 11.3 (ref 7–25)
CALCIUM SPEC-SCNC: 9.7 MG/DL (ref 8.4–10.2)
CHLORIDE SERPL-SCNC: 103 MMOL/L (ref 101–112)
CO2 SERPL-SCNC: 32 MMOL/L (ref 22–30)
CREAT SERPL-MCNC: 1.68 MG/DL (ref 0.7–1.3)
DEPRECATED RDW RBC AUTO: 45.9 FL (ref 37–54)
EOSINOPHIL # BLD AUTO: 0.21 10*3/MM3 (ref 0–0.4)
EOSINOPHIL NFR BLD AUTO: 4.5 % (ref 0.3–6.2)
ERYTHROCYTE [DISTWIDTH] IN BLOOD BY AUTOMATED COUNT: 14.1 % (ref 12.3–15.4)
FERRITIN SERPL-MCNC: 41.3 NG/ML (ref 30–400)
FOLATE SERPL-MCNC: 9.37 NG/ML (ref 4.78–24.2)
GFR SERPL CREATININE-BSD FRML MDRD: 41 ML/MIN/1.73 (ref 49–113)
GLOBULIN UR ELPH-MCNC: 2.8 GM/DL (ref 2.3–3.5)
GLUCOSE SERPL-MCNC: 149 MG/DL (ref 70–99)
HBA1C MFR BLD: 4.9 % (ref 4.8–5.6)
HCT VFR BLD AUTO: 36.5 % (ref 37.5–51)
HGB BLD-MCNC: 11.6 G/DL (ref 13–17.7)
IRON 24H UR-MRATE: 69 MCG/DL (ref 59–158)
IRON SATN MFR SERPL: 15 % (ref 20–50)
LYMPHOCYTES # BLD AUTO: 0.89 10*3/MM3 (ref 0.7–3.1)
LYMPHOCYTES NFR BLD AUTO: 19.2 % (ref 19.6–45.3)
MAGNESIUM SERPL-MCNC: 2.3 MG/DL (ref 1.6–2.3)
MCH RBC QN AUTO: 29.1 PG (ref 26.6–33)
MCHC RBC AUTO-ENTMCNC: 31.8 G/DL (ref 31.5–35.7)
MCV RBC AUTO: 91.5 FL (ref 79–97)
MONOCYTES # BLD AUTO: 0.34 10*3/MM3 (ref 0.1–0.9)
MONOCYTES NFR BLD AUTO: 7.3 % (ref 5–12)
NEUTROPHILS NFR BLD AUTO: 3.15 10*3/MM3 (ref 1.7–7)
NEUTROPHILS NFR BLD AUTO: 67.9 % (ref 42.7–76)
PLATELET # BLD AUTO: 205 10*3/MM3 (ref 140–450)
PMV BLD AUTO: 9.9 FL (ref 6–12)
POTASSIUM SERPL-SCNC: 4.1 MMOL/L (ref 3.4–5)
PROT SERPL-MCNC: 7.1 G/DL (ref 6.3–8.6)
RBC # BLD AUTO: 3.99 10*6/MM3 (ref 4.14–5.8)
SODIUM SERPL-SCNC: 140 MMOL/L (ref 137–145)
TIBC SERPL-MCNC: 456 MCG/DL (ref 298–536)
TRANSFERRIN SERPL-MCNC: 306 MG/DL (ref 200–360)
TRIGL SERPL-MCNC: 66 MG/DL
VIT B12 BLD-MCNC: 286 PG/ML (ref 211–946)
WBC # BLD AUTO: 4.64 10*3/MM3 (ref 3.4–10.8)

## 2021-08-05 PROCEDURE — 84466 ASSAY OF TRANSFERRIN: CPT | Performed by: INTERNAL MEDICINE

## 2021-08-05 PROCEDURE — 82746 ASSAY OF FOLIC ACID SERUM: CPT | Performed by: INTERNAL MEDICINE

## 2021-08-05 PROCEDURE — 82607 VITAMIN B-12: CPT | Performed by: INTERNAL MEDICINE

## 2021-08-05 PROCEDURE — 83036 HEMOGLOBIN GLYCOSYLATED A1C: CPT | Performed by: INTERNAL MEDICINE

## 2021-08-05 PROCEDURE — 83721 ASSAY OF BLOOD LIPOPROTEIN: CPT | Performed by: INTERNAL MEDICINE

## 2021-08-05 PROCEDURE — 80053 COMPREHEN METABOLIC PANEL: CPT | Performed by: INTERNAL MEDICINE

## 2021-08-05 PROCEDURE — 84478 ASSAY OF TRIGLYCERIDES: CPT | Performed by: INTERNAL MEDICINE

## 2021-08-05 PROCEDURE — 83735 ASSAY OF MAGNESIUM: CPT | Performed by: INTERNAL MEDICINE

## 2021-08-05 PROCEDURE — 82728 ASSAY OF FERRITIN: CPT | Performed by: INTERNAL MEDICINE

## 2021-08-05 PROCEDURE — 83540 ASSAY OF IRON: CPT | Performed by: INTERNAL MEDICINE

## 2021-08-05 PROCEDURE — 36415 COLL VENOUS BLD VENIPUNCTURE: CPT | Performed by: INTERNAL MEDICINE

## 2021-08-05 PROCEDURE — 85025 COMPLETE CBC W/AUTO DIFF WBC: CPT | Performed by: INTERNAL MEDICINE

## 2021-08-10 NOTE — PROGRESS NOTES
Chief Complaint  1 month follow up (Review labs)    Subjective          History of Present Illness     Rashaun Quintanilla presents to the clinic for follow up of postoperative anemia and rather difficult to control hypertension as well as multiple complex chronic medical issues including hypertension, stage III chronic kidney disease due to right nephrectomy, impaired fasting glucose, among other issues.  He was recently diagnosed with right renal cell carcinoma, for which he underwent right radical nephrectomy and massive IVC thrombectomy at [place] to address large right renal mass and massive inferior vena cava thrombosis, successfully addressed with surgical nephrectomy and thrombectomy.  His postoperative period was complicated by atrial fibrillation, for which he continues on Eliquis, Toprol-XL and amiodarone.  His cardiologist/electrophysiologist has indicated that he will likely discontinue amiodarone soon based on good report of recent 14-day Holter monitoring.    Renal function following the right nephrectomy has declined with creatinine 1.68 increased from baseline of 1.14 nine months ago.      His blood pressure is adequately controlled today, but blood pressure diary reveals morning systolic pressures are usually in the 150s, improving after he takes his blood pressure medications.  He reports that his urological surgeon was a little concerned about the HCTZ that is in his Diovan HCT, and we discussed this.  He avoids NSAIDs and other nephrotoxic drugs.    His postoperative anemia has greatly improved, although hemoglobin is not yet back to baseline.  Vitamin B-12 is below goal at 286, a new finding.  We discussed how to address the vitamin B12 deficiency.  Ferritin is 41, serum iron is normal, but iron saturation is 15.  He denies any postoperative evidence of GI or  blood loss.    The patient's relevant past medical, surgical, and social history was reviewed in Epic.   Lab results are reviewed with  "the patient today.  Fasting glucose 149.  A1c 4.9.  LDL and triglycerides at goal.  Normal liver function.  Renal function declined. LDL and triglycerides at goal.         Objective   Vital Signs:   /82 (BP Location: Left arm, Patient Position: Sitting, Cuff Size: Adult)   Pulse 73 Comment: regular  Temp 96.7 °F (35.9 °C) (Tympanic)   Ht 185.4 cm (73\")   Wt 87.5 kg (193 lb)   SpO2 98%   BMI 25.46 kg/m²       Physical Exam  Vitals reviewed.   Constitutional:       General: He is not in acute distress.     Appearance: Normal appearance. He is well-developed.      Comments: Very pleasant, intelligent gentleman   HENT:      Head: Normocephalic and atraumatic.      Nose:      Right Sinus: No maxillary sinus tenderness or frontal sinus tenderness.      Left Sinus: No maxillary sinus tenderness or frontal sinus tenderness.      Mouth/Throat:      Mouth: No oral lesions.      Pharynx: Uvula midline.      Tonsils: No tonsillar exudate.   Eyes:      Conjunctiva/sclera: Conjunctivae normal.      Pupils: Pupils are equal, round, and reactive to light.   Neck:      Thyroid: No thyroid mass or thyromegaly.      Vascular: No carotid bruit or JVD.      Trachea: Trachea normal. No tracheal deviation.   Cardiovascular:      Rate and Rhythm: Normal rate and regular rhythm.  No extrasystoles are present.     Chest Wall: PMI is not displaced.      Heart sounds: Normal heart sounds. No murmur heard.     Pulmonary:      Effort: Pulmonary effort is normal. No accessory muscle usage or respiratory distress.      Breath sounds: Normal breath sounds. No decreased breath sounds, wheezing, rhonchi or rales.   Abdominal:      General: Bowel sounds are normal. There is no distension.      Palpations: Abdomen is soft.      Tenderness: There is no abdominal tenderness.   Musculoskeletal:      Cervical back: Neck supple.   Lymphadenopathy:      Cervical: No cervical adenopathy.   Skin:     General: Skin is warm and dry.      Findings: No " rash.      Nails: There is no clubbing.      Comments: Amazingly, his Steri-Strips are still in place, but they are dirty and need to be removed.  No evidence of wound dehiscence.  No evidence of wound infection.   Neurological:      General: No focal deficit present.      Mental Status: He is alert and oriented to person, place, and time. Mental status is at baseline.      Cranial Nerves: No cranial nerve deficit.      Coordination: Coordination normal.   Psychiatric:         Mood and Affect: Mood normal.         Speech: Speech normal.         Behavior: Behavior normal.         Thought Content: Thought content normal.         Judgment: Judgment normal.              Result Review :     [facility] 6/17/21 6/17/21 6/17/21 6/21/21 8/5/21    0224 0224 0931     Glucose 134 (A)    149 (A)   BUN     19   Creatinine     1.68 (A)   eGFR Non African Am     41 (A)   Sodium     140   Potassium  4.6 4.5 3.6 4.1   Chloride     103   Calcium     9.7   Albumin     4.30   Total Bilirubin     0.9   Alkaline Phosphatase     67   AST (SGOT)     22   ALT (SGPT)     20   (A) Abnormal value            CBC w/diff    CBC w/Diff 6/16/21 6/16/21 6/17/21 6/17/21 8/5/21    1730 2156 0224 0931    WBC     4.64   RBC     3.99 (A)   Hemoglobin 9.6 (A) 9.8 (A) 8.9 (A) 8.3 (A) 11.6 (A)   Hematocrit     36.5 (A)   MCV     91.5   MCH     29.1   MCHC     31.8   RDW     14.1   Platelets     205   Neutrophil Rel %     67.9   Lymphocyte Rel %     19.2 (A)   Monocyte Rel %     7.3   Eosinophil Rel %     4.5   Basophil Rel %     1.1   (A) Abnormal value            Lipid Panel    Lipid Panel 11/25/20 8/5/21 8/5/21     0800 0800   Total Cholesterol 109 (A)     Triglycerides 65 66    HDL Cholesterol 36 (A)     VLDL Cholesterol 14     LDL Cholesterol  59  62   LDL/HDL Ratio 1.67     (A) Abnormal value            TSH    TSH 11/25/20 6/20/21   TSH 1.290 1.340      Comments are available for some flowsheets but are not being displayed.           A1C Last 3 Results   "  HGBA1C Last 3 Results 11/25/20 5/26/21 8/5/21   Hemoglobin A1C 5.50 5.4 4.90      Comments are available for some flowsheets but are not being displayed.           Data reviewed: Consultant notes [place] CT surgery          Assessment and Plan    Diagnoses and all orders for this visit:    1. Essential hypertension (Primary)    2. Postoperative anemia    3. Stage 3b chronic kidney disease (CMS/HCC)    4. Other dietary vitamin B12 deficiency anemia  -     cyanocobalamin injection 1,000 mcg    5. Renal cell carcinoma of right kidney (CMS/HCC) - S/P R radical nephrectomy 6/16/2021    6. 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 Paroxysmal atrial fibrillation (CMS/HCC)    Other orders  -     valsartan (Diovan) 320 MG tablet; Take 1 tablet by mouth Daily.  Dispense: 90 tablet; Refill: 3    Move Norvasc to nighttime dosing.  Stop the Diovan HCT and start Diovan 320 mg every morning.  Continue the Toprol- mg daily.  Continue to monitor blood pressure and heart rate twice daily.  Notify me if systolic blood pressure is not consistently less than 140.  Notify me if he starts experiencing any significant lower extremity edema without the HCTZ, as he is on high-dose Norvasc.  It sounds like amiodarone is going to be discontinued soon by his cardiology physicians at [place].  Continue to pursue sodium restriction and hydrate well.    For the new issue of vitamin B12 deficiency in this patient with postoperative anemia, he receives a vitamin B12 shot today and is to start oral vitamin B12 500 mcg daily.  We will reassess his iron studies and vitamin B12 levels in 4 months when we also repeat his other labs and CBC.    He remains on Eliquis at this time due to the postoperative paroxysmal atrial fibrillation and is history of massive IVC thrombosis.  Duration of anticoagulation will be determined by his specialist in [place].    He reports that his urology surgeon informed him that she \" got all " "of the cancer\".  She has not offered or suggested an oncology appointment.  I have asked him to discuss an oncology referral with her.    With his weight loss after surgery, his IFG has normalized.  A1c is now excellent.  We will continue to follow.  Try to avoid regaining the weight and continue a diabetic style diet.    I spent 43 minutes caring for Rashaun on this date of service. This time includes time spent by me in the following activities:preparing for the visit, reviewing tests, obtaining and/or reviewing a separately obtained history, performing a medically appropriate examination and/or evaluation , counseling and educating the patient/family/caregiver, ordering medications, tests, or procedures, documenting information in the medical record and independently interpreting results and communicating that information with the patient/family/caregiver     Follow Up   Return for Next scheduled follow up - labs 1 week prior.  Patient was given instructions and counseling regarding his condition or for health maintenance advice. Please see specific information pulled into the AVS if appropriate.       "

## 2021-08-12 ENCOUNTER — OFFICE VISIT (OUTPATIENT)
Dept: FAMILY MEDICINE CLINIC | Facility: CLINIC | Age: 61
End: 2021-08-12

## 2021-08-12 VITALS
BODY MASS INDEX: 25.58 KG/M2 | HEIGHT: 73 IN | HEART RATE: 73 BPM | DIASTOLIC BLOOD PRESSURE: 82 MMHG | WEIGHT: 193 LBS | TEMPERATURE: 96.7 F | OXYGEN SATURATION: 98 % | SYSTOLIC BLOOD PRESSURE: 138 MMHG

## 2021-08-12 DIAGNOSIS — D51.3 OTHER DIETARY VITAMIN B12 DEFICIENCY ANEMIA: Chronic | ICD-10-CM

## 2021-08-12 DIAGNOSIS — C64.1 RENAL CELL CARCINOMA OF RIGHT KIDNEY (HCC): Chronic | ICD-10-CM

## 2021-08-12 DIAGNOSIS — N18.32 STAGE 3B CHRONIC KIDNEY DISEASE (HCC): Chronic | ICD-10-CM

## 2021-08-12 DIAGNOSIS — D64.9 POSTOPERATIVE ANEMIA: ICD-10-CM

## 2021-08-12 DIAGNOSIS — I10 ESSENTIAL HYPERTENSION: Primary | Chronic | ICD-10-CM

## 2021-08-12 DIAGNOSIS — I48.0 PAROXYSMAL ATRIAL FIBRILLATION (HCC): ICD-10-CM

## 2021-08-12 DIAGNOSIS — I82.220 TUMOR OF RIGHT KIDNEY WITH THROMBUS OF IVC (HCC): ICD-10-CM

## 2021-08-12 DIAGNOSIS — D49.511 TUMOR OF RIGHT KIDNEY WITH THROMBUS OF IVC (HCC): ICD-10-CM

## 2021-08-12 PROBLEM — N28.89 RIGHT RENAL MASS: Status: ACTIVE | Noted: 2021-06-16

## 2021-08-12 PROBLEM — R60.0 EDEMA OF BOTH LOWER EXTREMITIES: Status: ACTIVE | Noted: 2021-07-01

## 2021-08-12 PROCEDURE — 96372 THER/PROPH/DIAG INJ SC/IM: CPT | Performed by: INTERNAL MEDICINE

## 2021-08-12 PROCEDURE — 99215 OFFICE O/P EST HI 40 MIN: CPT | Performed by: INTERNAL MEDICINE

## 2021-08-12 RX ORDER — VALSARTAN AND HYDROCHLOROTHIAZIDE 160; 12.5 MG/1; MG/1
1 TABLET, FILM COATED ORAL DAILY
COMMUNITY
Start: 2021-07-29 | End: 2021-08-12 | Stop reason: ALTCHOICE

## 2021-08-12 RX ORDER — CHOLECALCIFEROL (VITAMIN D3) 125 MCG
500 CAPSULE ORAL DAILY
COMMUNITY

## 2021-08-12 RX ORDER — VALSARTAN 320 MG/1
320 TABLET ORAL DAILY
Qty: 90 TABLET | Refills: 3 | Status: SHIPPED | OUTPATIENT
Start: 2021-08-12 | End: 2022-08-08

## 2021-08-12 RX ORDER — AMLODIPINE BESYLATE 10 MG/1
10 TABLET ORAL NIGHTLY
Qty: 90 TABLET | Refills: 3 | COMMUNITY
Start: 2021-08-12 | End: 2021-12-29 | Stop reason: ALTCHOICE

## 2021-08-12 RX ORDER — CYANOCOBALAMIN 1000 UG/ML
1000 INJECTION, SOLUTION INTRAMUSCULAR; SUBCUTANEOUS
Status: SHIPPED | OUTPATIENT
Start: 2021-08-12

## 2021-08-12 RX ADMIN — CYANOCOBALAMIN 1000 MCG: 1000 INJECTION, SOLUTION INTRAMUSCULAR; SUBCUTANEOUS at 09:43

## 2021-09-29 ENCOUNTER — TRANSCRIBE ORDERS (OUTPATIENT)
Dept: CT IMAGING | Facility: CLINIC | Age: 61
End: 2021-09-29

## 2021-09-29 DIAGNOSIS — I82.220 TUMOR OF RIGHT KIDNEY WITH THROMBUS OF IVC (HCC): Primary | ICD-10-CM

## 2021-09-29 DIAGNOSIS — D49.511 TUMOR OF RIGHT KIDNEY WITH THROMBUS OF IVC (HCC): Primary | ICD-10-CM

## 2021-10-14 ENCOUNTER — TRANSCRIBE ORDERS (OUTPATIENT)
Dept: LAB | Facility: OTHER | Age: 61
End: 2021-10-14

## 2021-10-14 DIAGNOSIS — I82.220 TUMOR OF RIGHT KIDNEY WITH THROMBUS OF IVC (HCC): Primary | ICD-10-CM

## 2021-10-14 DIAGNOSIS — D49.511 TUMOR OF RIGHT KIDNEY WITH THROMBUS OF IVC (HCC): Primary | ICD-10-CM

## 2021-10-15 ENCOUNTER — LAB (OUTPATIENT)
Dept: LAB | Facility: OTHER | Age: 61
End: 2021-10-15

## 2021-10-15 ENCOUNTER — TRANSCRIBE ORDERS (OUTPATIENT)
Dept: GENERAL RADIOLOGY | Facility: CLINIC | Age: 61
End: 2021-10-15

## 2021-10-15 DIAGNOSIS — D49.511 TUMOR OF RIGHT KIDNEY WITH THROMBUS OF IVC (HCC): Primary | ICD-10-CM

## 2021-10-15 DIAGNOSIS — I82.220 TUMOR OF RIGHT KIDNEY WITH THROMBUS OF IVC (HCC): Primary | ICD-10-CM

## 2021-10-15 DIAGNOSIS — C64.1 RENAL CELL CARCINOMA OF RIGHT KIDNEY (HCC): ICD-10-CM

## 2021-10-15 DIAGNOSIS — I82.220 TUMOR OF RIGHT KIDNEY WITH THROMBUS OF IVC (HCC): ICD-10-CM

## 2021-10-15 DIAGNOSIS — D49.511 TUMOR OF RIGHT KIDNEY WITH THROMBUS OF IVC (HCC): ICD-10-CM

## 2021-10-15 LAB
ALBUMIN SERPL-MCNC: 4.3 G/DL (ref 3.5–5)
ALBUMIN/GLOB SERPL: 1.5 G/DL (ref 1.1–1.8)
ALP SERPL-CCNC: 68 U/L (ref 38–126)
ALT SERPL W P-5'-P-CCNC: 19 U/L
ANION GAP SERPL CALCULATED.3IONS-SCNC: 6 MMOL/L (ref 5–15)
AST SERPL-CCNC: 21 U/L (ref 17–59)
BILIRUB SERPL-MCNC: 0.7 MG/DL (ref 0.2–1.3)
BUN SERPL-MCNC: 19 MG/DL (ref 7–23)
BUN/CREAT SERPL: 9.9 (ref 7–25)
CALCIUM SPEC-SCNC: 9.7 MG/DL (ref 8.4–10.2)
CHLORIDE SERPL-SCNC: 106 MMOL/L (ref 101–112)
CO2 SERPL-SCNC: 29 MMOL/L (ref 22–30)
CREAT SERPL-MCNC: 1.91 MG/DL (ref 0.7–1.3)
GFR SERPL CREATININE-BSD FRML MDRD: 36 ML/MIN/1.73 (ref 49–113)
GLOBULIN UR ELPH-MCNC: 2.8 GM/DL (ref 2.3–3.5)
GLUCOSE SERPL-MCNC: 113 MG/DL (ref 70–99)
POTASSIUM SERPL-SCNC: 4.3 MMOL/L (ref 3.4–5)
PROT SERPL-MCNC: 7.1 G/DL (ref 6.3–8.6)
SODIUM SERPL-SCNC: 141 MMOL/L (ref 137–145)

## 2021-10-15 PROCEDURE — 80053 COMPREHEN METABOLIC PANEL: CPT | Performed by: INTERNAL MEDICINE

## 2021-10-15 PROCEDURE — 36415 COLL VENOUS BLD VENIPUNCTURE: CPT | Performed by: INTERNAL MEDICINE

## 2021-10-26 ENCOUNTER — LAB (OUTPATIENT)
Dept: LAB | Facility: OTHER | Age: 61
End: 2021-10-26

## 2021-10-26 ENCOUNTER — TRANSCRIBE ORDERS (OUTPATIENT)
Dept: GENERAL RADIOLOGY | Facility: CLINIC | Age: 61
End: 2021-10-26

## 2021-10-26 DIAGNOSIS — C64.1 RENAL CELL CARCINOMA OF RIGHT KIDNEY (HCC): Primary | ICD-10-CM

## 2021-10-29 RX ORDER — HYDRALAZINE HYDROCHLORIDE 25 MG/1
TABLET, FILM COATED ORAL
Qty: 90 TABLET | Refills: 3 | Status: SHIPPED | OUTPATIENT
Start: 2021-10-29 | End: 2021-12-29 | Stop reason: SDUPTHER

## 2021-10-29 RX ORDER — FAMOTIDINE 20 MG/1
20 TABLET, FILM COATED ORAL 2 TIMES DAILY
Qty: 180 TABLET | Refills: 3 | Status: SHIPPED | OUTPATIENT
Start: 2021-10-29

## 2021-12-15 ENCOUNTER — LAB (OUTPATIENT)
Dept: LAB | Facility: OTHER | Age: 61
End: 2021-12-15

## 2021-12-16 ENCOUNTER — LAB (OUTPATIENT)
Dept: LAB | Facility: OTHER | Age: 61
End: 2021-12-16

## 2021-12-16 DIAGNOSIS — D64.9 POSTOPERATIVE ANEMIA: ICD-10-CM

## 2021-12-16 LAB
IRON 24H UR-MRATE: 133 MCG/DL (ref 59–158)
IRON SATN MFR SERPL: 28 % (ref 20–50)
TIBC SERPL-MCNC: 471 MCG/DL (ref 298–536)
TRANSFERRIN SERPL-MCNC: 316 MG/DL (ref 200–360)

## 2021-12-16 PROCEDURE — 36415 COLL VENOUS BLD VENIPUNCTURE: CPT | Performed by: INTERNAL MEDICINE

## 2021-12-16 PROCEDURE — 84466 ASSAY OF TRANSFERRIN: CPT | Performed by: INTERNAL MEDICINE

## 2021-12-16 PROCEDURE — 83540 ASSAY OF IRON: CPT | Performed by: INTERNAL MEDICINE

## 2021-12-28 DIAGNOSIS — Z12.5 SPECIAL SCREENING FOR MALIGNANT NEOPLASM OF PROSTATE: ICD-10-CM

## 2021-12-28 DIAGNOSIS — R73.01 IMPAIRED FASTING GLUCOSE: ICD-10-CM

## 2021-12-28 DIAGNOSIS — D64.9 POSTOPERATIVE ANEMIA: ICD-10-CM

## 2021-12-28 DIAGNOSIS — E78.2 MIXED HYPERLIPIDEMIA: ICD-10-CM

## 2021-12-28 DIAGNOSIS — Z11.59 ENCOUNTER FOR HEPATITIS C SCREENING TEST FOR LOW RISK PATIENT: ICD-10-CM

## 2021-12-28 DIAGNOSIS — I10 ESSENTIAL HYPERTENSION: Primary | ICD-10-CM

## 2021-12-28 DIAGNOSIS — D51.3 OTHER DIETARY VITAMIN B12 DEFICIENCY ANEMIA: ICD-10-CM

## 2021-12-28 DIAGNOSIS — N18.31 STAGE 3A CHRONIC KIDNEY DISEASE (HCC): ICD-10-CM

## 2021-12-29 ENCOUNTER — LAB (OUTPATIENT)
Dept: LAB | Facility: OTHER | Age: 61
End: 2021-12-29

## 2021-12-29 ENCOUNTER — OFFICE VISIT (OUTPATIENT)
Dept: FAMILY MEDICINE CLINIC | Facility: CLINIC | Age: 61
End: 2021-12-29

## 2021-12-29 VITALS
HEART RATE: 64 BPM | DIASTOLIC BLOOD PRESSURE: 86 MMHG | HEIGHT: 73 IN | WEIGHT: 220 LBS | BODY MASS INDEX: 29.16 KG/M2 | TEMPERATURE: 97.2 F | SYSTOLIC BLOOD PRESSURE: 144 MMHG

## 2021-12-29 DIAGNOSIS — D63.1 ANEMIA DUE TO STAGE 3B CHRONIC KIDNEY DISEASE (HCC): Chronic | ICD-10-CM

## 2021-12-29 DIAGNOSIS — E78.2 MIXED HYPERLIPIDEMIA: Chronic | ICD-10-CM

## 2021-12-29 DIAGNOSIS — R73.01 IMPAIRED FASTING GLUCOSE: ICD-10-CM

## 2021-12-29 DIAGNOSIS — Z11.59 ENCOUNTER FOR HEPATITIS C SCREENING TEST FOR LOW RISK PATIENT: ICD-10-CM

## 2021-12-29 DIAGNOSIS — I10 ESSENTIAL HYPERTENSION: ICD-10-CM

## 2021-12-29 DIAGNOSIS — N18.31 STAGE 3A CHRONIC KIDNEY DISEASE: ICD-10-CM

## 2021-12-29 DIAGNOSIS — E78.2 MIXED HYPERLIPIDEMIA: ICD-10-CM

## 2021-12-29 DIAGNOSIS — N18.32 STAGE 3B CHRONIC KIDNEY DISEASE (HCC): Chronic | ICD-10-CM

## 2021-12-29 DIAGNOSIS — D64.9 POSTOPERATIVE ANEMIA: ICD-10-CM

## 2021-12-29 DIAGNOSIS — Z23 FLU VACCINE NEED: ICD-10-CM

## 2021-12-29 DIAGNOSIS — R97.20 ELEVATED PSA, LESS THAN 10 NG/ML: Chronic | ICD-10-CM

## 2021-12-29 DIAGNOSIS — D51.3 OTHER DIETARY VITAMIN B12 DEFICIENCY ANEMIA: ICD-10-CM

## 2021-12-29 DIAGNOSIS — R73.01 IMPAIRED FASTING GLUCOSE: Chronic | ICD-10-CM

## 2021-12-29 DIAGNOSIS — N18.32 ANEMIA DUE TO STAGE 3B CHRONIC KIDNEY DISEASE (HCC): Chronic | ICD-10-CM

## 2021-12-29 DIAGNOSIS — Z12.5 SPECIAL SCREENING FOR MALIGNANT NEOPLASM OF PROSTATE: ICD-10-CM

## 2021-12-29 DIAGNOSIS — D51.3 OTHER DIETARY VITAMIN B12 DEFICIENCY ANEMIA: Chronic | ICD-10-CM

## 2021-12-29 DIAGNOSIS — I10 ESSENTIAL HYPERTENSION: Primary | Chronic | ICD-10-CM

## 2021-12-29 LAB
ALBUMIN SERPL-MCNC: 4.1 G/DL (ref 3.5–5)
ALBUMIN/GLOB SERPL: 1.5 G/DL (ref 1.1–1.8)
ALP SERPL-CCNC: 65 U/L (ref 38–126)
ALT SERPL W P-5'-P-CCNC: 22 U/L
ANION GAP SERPL CALCULATED.3IONS-SCNC: 6 MMOL/L (ref 5–15)
AST SERPL-CCNC: 23 U/L (ref 17–59)
BASOPHILS # BLD AUTO: 0.06 10*3/MM3 (ref 0–0.2)
BASOPHILS NFR BLD AUTO: 1 % (ref 0–1.5)
BILIRUB SERPL-MCNC: 0.9 MG/DL (ref 0.2–1.3)
BUN SERPL-MCNC: 21 MG/DL (ref 7–23)
BUN/CREAT SERPL: 11.9 (ref 7–25)
CALCIUM SPEC-SCNC: 9.4 MG/DL (ref 8.4–10.2)
CHLORIDE SERPL-SCNC: 103 MMOL/L (ref 101–112)
CHOLEST SERPL-MCNC: 126 MG/DL (ref 150–200)
CO2 SERPL-SCNC: 30 MMOL/L (ref 22–30)
CREAT SERPL-MCNC: 1.77 MG/DL (ref 0.7–1.3)
DEPRECATED RDW RBC AUTO: 44.8 FL (ref 37–54)
EOSINOPHIL # BLD AUTO: 0.25 10*3/MM3 (ref 0–0.4)
EOSINOPHIL NFR BLD AUTO: 4.1 % (ref 0.3–6.2)
ERYTHROCYTE [DISTWIDTH] IN BLOOD BY AUTOMATED COUNT: 13.4 % (ref 12.3–15.4)
FERRITIN SERPL-MCNC: 29.4 NG/ML (ref 30–400)
GFR SERPL CREATININE-BSD FRML MDRD: 39 ML/MIN/1.73 (ref 49–113)
GLOBULIN UR ELPH-MCNC: 2.8 GM/DL (ref 2.3–3.5)
GLUCOSE SERPL-MCNC: 103 MG/DL (ref 70–99)
HBA1C MFR BLD: 5.07 % (ref 4.8–5.6)
HCT VFR BLD AUTO: 38.6 % (ref 37.5–51)
HCV AB SER DONR QL: NORMAL
HDLC SERPL-MCNC: 35 MG/DL (ref 40–59)
HGB BLD-MCNC: 12.7 G/DL (ref 13–17.7)
LDLC SERPL CALC-MCNC: 72 MG/DL
LDLC/HDLC SERPL: 2.01 {RATIO} (ref 0–3.55)
LYMPHOCYTES # BLD AUTO: 1.58 10*3/MM3 (ref 0.7–3.1)
LYMPHOCYTES NFR BLD AUTO: 25.6 % (ref 19.6–45.3)
MCH RBC QN AUTO: 30.8 PG (ref 26.6–33)
MCHC RBC AUTO-ENTMCNC: 32.9 G/DL (ref 31.5–35.7)
MCV RBC AUTO: 93.5 FL (ref 79–97)
MONOCYTES # BLD AUTO: 0.62 10*3/MM3 (ref 0.1–0.9)
MONOCYTES NFR BLD AUTO: 10.1 % (ref 5–12)
NEUTROPHILS NFR BLD AUTO: 3.65 10*3/MM3 (ref 1.7–7)
NEUTROPHILS NFR BLD AUTO: 59.2 % (ref 42.7–76)
PLATELET # BLD AUTO: 193 10*3/MM3 (ref 140–450)
PMV BLD AUTO: 10 FL (ref 6–12)
POTASSIUM SERPL-SCNC: 4.1 MMOL/L (ref 3.4–5)
PROT SERPL-MCNC: 6.9 G/DL (ref 6.3–8.6)
PSA SERPL-MCNC: 5.93 NG/ML (ref 0–4)
RBC # BLD AUTO: 4.13 10*6/MM3 (ref 4.14–5.8)
SODIUM SERPL-SCNC: 139 MMOL/L (ref 137–145)
TRIGL SERPL-MCNC: 103 MG/DL
TSH SERPL DL<=0.05 MIU/L-ACNC: 2.01 UIU/ML (ref 0.27–4.2)
VIT B12 BLD-MCNC: 402 PG/ML (ref 211–946)
VLDLC SERPL-MCNC: 19 MG/DL (ref 5–40)
WBC NRBC COR # BLD: 6.16 10*3/MM3 (ref 3.4–10.8)

## 2021-12-29 PROCEDURE — 83036 HEMOGLOBIN GLYCOSYLATED A1C: CPT | Performed by: INTERNAL MEDICINE

## 2021-12-29 PROCEDURE — 84443 ASSAY THYROID STIM HORMONE: CPT | Performed by: INTERNAL MEDICINE

## 2021-12-29 PROCEDURE — 86803 HEPATITIS C AB TEST: CPT | Performed by: INTERNAL MEDICINE

## 2021-12-29 PROCEDURE — 36415 COLL VENOUS BLD VENIPUNCTURE: CPT | Performed by: INTERNAL MEDICINE

## 2021-12-29 PROCEDURE — G0103 PSA SCREENING: HCPCS | Performed by: INTERNAL MEDICINE

## 2021-12-29 PROCEDURE — 90471 IMMUNIZATION ADMIN: CPT | Performed by: INTERNAL MEDICINE

## 2021-12-29 PROCEDURE — 82607 VITAMIN B-12: CPT | Performed by: INTERNAL MEDICINE

## 2021-12-29 PROCEDURE — 80053 COMPREHEN METABOLIC PANEL: CPT | Performed by: INTERNAL MEDICINE

## 2021-12-29 PROCEDURE — 82728 ASSAY OF FERRITIN: CPT | Performed by: INTERNAL MEDICINE

## 2021-12-29 PROCEDURE — 99214 OFFICE O/P EST MOD 30 MIN: CPT | Performed by: INTERNAL MEDICINE

## 2021-12-29 PROCEDURE — 85025 COMPLETE CBC W/AUTO DIFF WBC: CPT | Performed by: INTERNAL MEDICINE

## 2021-12-29 PROCEDURE — 90686 IIV4 VACC NO PRSV 0.5 ML IM: CPT | Performed by: INTERNAL MEDICINE

## 2021-12-29 PROCEDURE — 80061 LIPID PANEL: CPT | Performed by: INTERNAL MEDICINE

## 2021-12-29 RX ORDER — FLUTICASONE PROPIONATE 50 MCG
2 SPRAY, SUSPENSION (ML) NASAL DAILY
Qty: 9.9 ML | Refills: 3 | Status: SHIPPED | OUTPATIENT
Start: 2021-12-29

## 2021-12-29 RX ORDER — NIFEDIPINE 90 MG/1
90 TABLET, FILM COATED, EXTENDED RELEASE ORAL DAILY
COMMUNITY
Start: 2021-12-06 | End: 2022-11-29 | Stop reason: SDUPTHER

## 2021-12-29 RX ORDER — HYDRALAZINE HYDROCHLORIDE 50 MG/1
50 TABLET, FILM COATED ORAL 3 TIMES DAILY
Qty: 270 TABLET | Refills: 3 | Status: SHIPPED | OUTPATIENT
Start: 2021-12-29 | End: 2022-01-03 | Stop reason: SDUPTHER

## 2021-12-29 NOTE — PATIENT INSTRUCTIONS

## 2021-12-30 PROBLEM — Z86.79 HISTORY OF ATRIAL FIBRILLATION: Status: ACTIVE | Noted: 2021-12-30

## 2021-12-30 PROBLEM — N18.9 ANEMIA DUE TO CHRONIC KIDNEY DISEASE: Chronic | Status: ACTIVE | Noted: 2021-12-30

## 2021-12-30 PROBLEM — I48.0 PAROXYSMAL ATRIAL FIBRILLATION (HCC): Status: RESOLVED | Noted: 2021-06-18 | Resolved: 2021-12-30

## 2021-12-30 PROBLEM — D63.1 ANEMIA DUE TO CHRONIC KIDNEY DISEASE: Chronic | Status: ACTIVE | Noted: 2021-12-30

## 2021-12-30 PROBLEM — N18.32 ANEMIA DUE TO STAGE 3B CHRONIC KIDNEY DISEASE: Chronic | Status: ACTIVE | Noted: 2021-12-30

## 2021-12-30 NOTE — PROGRESS NOTES
Chief Complaint  Annual Exam, Dizziness (not dizzy. occasionally headache), and Immunizations (flu vaccine given in right deltoid and tolerated procedure well )    Subjective          Rashaun Quintanilla presents to [facility]  History of Present Illness    Rashaun is here for follow up of  multiple chronic medical problems including recent postoperative anemia and rather difficult to control hypertension as well as other multiple complex chronic medical issues including hypertension, stage III chronic kidney disease due to right nephrectomy, impaired fasting glucose, elevated PSA among other issues.  He was recently diagnosed with right renal cell carcinoma, for which he underwent right radical nephrectomy and massive IVC thrombectomy at [place] to address large right renal mass and massive inferior vena cava thrombosis, successfully addressed with surgical nephrectomy and thrombectomy.  His postoperative period was complicated by atrial fibrillation, but he has remained in normal sinus rhythm and Eliquis and antiarrhythmic medication has recently been discontinued by cardiology/EP service at [place].  I reviewed the note documenting his recent visit with his urologist, Ruba Bacon.    His weight is up 27 pounds in the past 4 months and BMI is now 29.  I emphasized how very important it is for him to exercise regularly, reduce calories and lose weight and the benefits that we will provide with his chronic medical issues.  He is taking hydralazine twice daily.  I reviewed his blood pressure diary.  Systolic pressures have been averaging around 150.    Some of the patient's labs are not back yet and we will contact him after everything is back.  Serum creatinine is 1.7, improved from 1.9 approximately 2 months ago.  Unfortunately, PSA has increased from 4.2-5.9.  PSA result came back after the patient left and we will contact him with this information.   "Hopefully, this can be addressed with his current urologist, if insurance will approve that referral.  She may be out of his preferred network.    Objective   Vital Signs:   /86   Pulse 64   Temp 97.2 °F (36.2 °C) (Tympanic)   Ht 185.4 cm (73\")   Wt 99.8 kg (220 lb)   BMI 29.03 kg/m²     Physical Exam  Vitals reviewed.   Constitutional:       General: He is not in acute distress.     Appearance: Normal appearance. He is well-developed.      Comments: Very pleasant, intelligent gentleman   HENT:      Head: Normocephalic and atraumatic.      Nose:      Right Sinus: No maxillary sinus tenderness or frontal sinus tenderness.      Left Sinus: No maxillary sinus tenderness or frontal sinus tenderness.      Mouth/Throat:      Mouth: No oral lesions.      Pharynx: Uvula midline.      Tonsils: No tonsillar exudate.   Eyes:      Conjunctiva/sclera: Conjunctivae normal.      Pupils: Pupils are equal, round, and reactive to light.   Neck:      Thyroid: No thyroid mass or thyromegaly.      Vascular: No carotid bruit or JVD.      Trachea: Trachea normal. No tracheal deviation.   Cardiovascular:      Rate and Rhythm: Normal rate and regular rhythm.  No extrasystoles are present.     Chest Wall: PMI is not displaced.      Heart sounds: Normal heart sounds. No murmur heard.      Pulmonary:      Effort: Pulmonary effort is normal. No accessory muscle usage or respiratory distress.      Breath sounds: Normal breath sounds. No decreased breath sounds, wheezing, rhonchi or rales.   Abdominal:      General: Bowel sounds are normal. There is no distension.      Palpations: Abdomen is soft.      Tenderness: There is no abdominal tenderness.      Comments: Overweight abdomen   Musculoskeletal:      Cervical back: Neck supple.   Lymphadenopathy:      Cervical: No cervical adenopathy.   Skin:     General: Skin is warm and dry.      Findings: No rash.      Nails: There is no clubbing.      Comments: Extensive abdominal surgical " incisions have healed nicely   Neurological:      General: No focal deficit present.      Mental Status: He is alert and oriented to person, place, and time. Mental status is at baseline.      Cranial Nerves: No cranial nerve deficit.      Coordination: Coordination normal.   Psychiatric:         Mood and Affect: Mood normal.         Speech: Speech normal.         Behavior: Behavior normal.         Thought Content: Thought content normal.         Judgment: Judgment normal.        Result Review :     Common labs    Common Labsle 8/5/21 8/5/21 8/5/21 8/5/21 8/5/21 10/15/21 12/29/21 12/29/21 12/29/21 12/29/21 12/29/21    0800 0800 0800 0800 0800  0707 0707 0707 0707 0707   Glucose  149 (A)    113 (A)  103 (A)      BUN  19    19  21      Creatinine  1.68 (A)    1.91 (A)  1.77 (A)      eGFR Non  Am  41 (A)    36 (A)  39 (A)      Sodium  140    141  139      Potassium  4.1    4.3  4.1      Chloride  103    106  103      Calcium  9.7    9.7  9.4      Albumin  4.30    4.30  4.10      Total Bilirubin  0.9    0.7  0.9      Alkaline Phosphatase  67    68  65      AST (SGOT)  22    21  23      ALT (SGPT)  20    19  22      WBC 4.64      6.16       Hemoglobin 11.6 (A)      12.7 (A)       Hematocrit 36.5 (A)      38.6       Platelets 205      193       Total Cholesterol         126 (A)     Triglycerides   66      103     HDL Cholesterol         35 (A)     LDL Cholesterol      62    72     Hemoglobin A1C    4.90      5.07    PSA           5.930 (A)   (A) Abnormal value              Reviewed urology note from [place].     Assessment and Plan    Diagnoses and all orders for this visit:    1. Essential hypertension (Primary)  -     CBC Auto Differential; Future  -     Comprehensive Metabolic Panel; Future    2. Stage 3b chronic kidney disease (HCC)  -     CBC Auto Differential; Future  -     Comprehensive Metabolic Panel; Future    3. Mixed hyperlipidemia  -     LDL Cholesterol, Direct; Future  -     Cancel: TSH;  Future    4. Flu vaccine need  -     FluLaval/Fluarix/Fluzone >6 Months    5. Elevated PSA, less than 10 ng/ml   -     PSA DIAGNOSTIC; Future    6. Impaired fasting glucose  -     Comprehensive Metabolic Panel; Future    7. Anemia due to stage 3b chronic kidney disease (HCC)  -     CBC Auto Differential; Future  -     Ferritin; Future  -     Folate; Future  -     Iron Profile; Future  -     Vitamin B12; Future  -     Vitamin D 25 Hydroxy; Future    8. Other dietary vitamin B12 deficiency anemia  -     Vitamin B12; Future    Other orders  -     fluticasone (FLONASE) 50 MCG/ACT nasal spray; 2 sprays into the nostril(s) as directed by provider Daily.  Dispense: 9.9 mL; Refill: 3  -     hydrALAZINE (APRESOLINE) 50 MG tablet; Take 1 tablet by mouth 3 (Three) Times a Day. 1/2 tab bid  Dispense: 270 tablet; Refill: 3  -     Cancel: Fluzone High-Dose 65+yrs  -     Zoster Vac Recomb Adjuvanted 50 MCG/0.5ML reconstituted suspension; Inject 0.5 mL into the appropriate muscle as directed by prescriber Every 2 (Two) Months.  Dispense: 1 each; Refill: 1      Increase hydralazine to 50 mg 3 times daily.  Continue with his other current blood pressure medications.  Aggressively pursue sodium restriction and weight loss.  Start exercising regularly and reduce caloric intake by 500 ramon daily.  He has gained a great deal of weight since longterm and since his surgery.  And I urged him to take this very seriously.  At least weekly weights at home.    Continue to avoid NSAIDs and other nephrotoxic drugs in this patient now with single kidney and stage IIIb CKD.  We will try to optimize his blood pressure and improve his body weight.  We will continue to follow the IFG, but this A1c was excellent at 5.0.    Continue the vitamin B12 supplement.  I will order additional anemia labs with next visit and continue to follow.  Hemoglobin is acceptable at 12.7, but his prior baseline was 14.  His baseline will likely be different now with  single kidney.    We will refer to his urologist of choice to evaluate the increasing PSA.  We will call him to discuss options.    Return to clinic in 6 months with fasting labs prior.    I spent 33 minutes caring for Rashaun on this date of service. This time includes time spent by me in the following activities:preparing for the visit, reviewing tests, obtaining and/or reviewing a separately obtained history, performing a medically appropriate examination and/or evaluation , counseling and educating the patient/family/caregiver, ordering medications, tests, or procedures and documenting information in the medical record  Follow Up   Return in about 4 months (around 4/29/2022) for Next scheduled follow up - labs 1 week prior.  Patient was given instructions and counseling regarding his condition or for health maintenance advice. Please see specific information pulled into the AVS if appropriate.

## 2022-01-03 RX ORDER — HYDRALAZINE HYDROCHLORIDE 50 MG/1
50 TABLET, FILM COATED ORAL 3 TIMES DAILY
Qty: 270 TABLET | Refills: 3 | Status: SHIPPED | OUTPATIENT
Start: 2022-01-03 | End: 2023-02-07

## 2022-01-10 ENCOUNTER — LAB (OUTPATIENT)
Dept: LAB | Facility: OTHER | Age: 62
End: 2022-01-10

## 2022-01-10 ENCOUNTER — TRANSCRIBE ORDERS (OUTPATIENT)
Dept: LAB | Facility: OTHER | Age: 62
End: 2022-01-10

## 2022-01-10 DIAGNOSIS — C64.1 RENAL CELL CARCINOMA OF RIGHT KIDNEY: Primary | ICD-10-CM

## 2022-01-10 DIAGNOSIS — C64.1 RENAL CELL CARCINOMA OF RIGHT KIDNEY: ICD-10-CM

## 2022-01-10 LAB
ALBUMIN SERPL-MCNC: 4.3 G/DL (ref 3.5–5)
ALBUMIN/GLOB SERPL: 1.6 G/DL (ref 1.1–1.8)
ALP SERPL-CCNC: 63 U/L (ref 38–126)
ALT SERPL W P-5'-P-CCNC: 22 U/L
ANION GAP SERPL CALCULATED.3IONS-SCNC: 7 MMOL/L (ref 5–15)
AST SERPL-CCNC: 24 U/L (ref 17–59)
BASOPHILS # BLD AUTO: 0.1 10*3/MM3 (ref 0–0.2)
BASOPHILS NFR BLD AUTO: 2.1 % (ref 0–1.5)
BILIRUB SERPL-MCNC: 0.9 MG/DL (ref 0.2–1.3)
BUN SERPL-MCNC: 21 MG/DL (ref 7–23)
BUN/CREAT SERPL: 13.4 (ref 7–25)
CALCIUM SPEC-SCNC: 9.7 MG/DL (ref 8.4–10.2)
CHLORIDE SERPL-SCNC: 104 MMOL/L (ref 101–112)
CO2 SERPL-SCNC: 28 MMOL/L (ref 22–30)
CREAT SERPL-MCNC: 1.57 MG/DL (ref 0.7–1.3)
DEPRECATED RDW RBC AUTO: 45.1 FL (ref 37–54)
EOSINOPHIL # BLD AUTO: 0.18 10*3/MM3 (ref 0–0.4)
EOSINOPHIL NFR BLD AUTO: 3.7 % (ref 0.3–6.2)
ERYTHROCYTE [DISTWIDTH] IN BLOOD BY AUTOMATED COUNT: 13.6 % (ref 12.3–15.4)
GFR SERPL CREATININE-BSD FRML MDRD: 45 ML/MIN/1.73 (ref 49–113)
GLOBULIN UR ELPH-MCNC: 2.7 GM/DL (ref 2.3–3.5)
GLUCOSE SERPL-MCNC: 114 MG/DL (ref 70–99)
HCT VFR BLD AUTO: 40.1 % (ref 37.5–51)
HGB BLD-MCNC: 13.1 G/DL (ref 13–17.7)
LYMPHOCYTES # BLD AUTO: 1.23 10*3/MM3 (ref 0.7–3.1)
LYMPHOCYTES NFR BLD AUTO: 25.5 % (ref 19.6–45.3)
MCH RBC QN AUTO: 30.3 PG (ref 26.6–33)
MCHC RBC AUTO-ENTMCNC: 32.7 G/DL (ref 31.5–35.7)
MCV RBC AUTO: 92.8 FL (ref 79–97)
MONOCYTES # BLD AUTO: 0.45 10*3/MM3 (ref 0.1–0.9)
MONOCYTES NFR BLD AUTO: 9.3 % (ref 5–12)
NEUTROPHILS NFR BLD AUTO: 2.86 10*3/MM3 (ref 1.7–7)
NEUTROPHILS NFR BLD AUTO: 59.4 % (ref 42.7–76)
PLATELET # BLD AUTO: 198 10*3/MM3 (ref 140–450)
PMV BLD AUTO: 9.9 FL (ref 6–12)
POTASSIUM SERPL-SCNC: 4 MMOL/L (ref 3.4–5)
PROT SERPL-MCNC: 7 G/DL (ref 6.3–8.6)
RBC # BLD AUTO: 4.32 10*6/MM3 (ref 4.14–5.8)
SODIUM SERPL-SCNC: 139 MMOL/L (ref 137–145)
WBC NRBC COR # BLD: 4.82 10*3/MM3 (ref 3.4–10.8)

## 2022-01-10 PROCEDURE — 36415 COLL VENOUS BLD VENIPUNCTURE: CPT | Performed by: INTERNAL MEDICINE

## 2022-01-10 PROCEDURE — 80053 COMPREHEN METABOLIC PANEL: CPT | Performed by: INTERNAL MEDICINE

## 2022-01-10 PROCEDURE — 85025 COMPLETE CBC W/AUTO DIFF WBC: CPT | Performed by: INTERNAL MEDICINE

## 2022-01-25 ENCOUNTER — OFFICE VISIT (OUTPATIENT)
Dept: FAMILY MEDICINE CLINIC | Facility: CLINIC | Age: 62
End: 2022-01-25

## 2022-01-25 VITALS
TEMPERATURE: 97.5 F | BODY MASS INDEX: 29.82 KG/M2 | DIASTOLIC BLOOD PRESSURE: 90 MMHG | HEIGHT: 73 IN | SYSTOLIC BLOOD PRESSURE: 164 MMHG | HEART RATE: 68 BPM | WEIGHT: 225 LBS

## 2022-01-25 DIAGNOSIS — C64.1 RENAL CELL CARCINOMA OF RIGHT KIDNEY: Chronic | ICD-10-CM

## 2022-01-25 DIAGNOSIS — I10 ESSENTIAL HYPERTENSION: Primary | Chronic | ICD-10-CM

## 2022-01-25 DIAGNOSIS — E66.3 OVERWEIGHT (BMI 25.0-29.9): ICD-10-CM

## 2022-01-25 DIAGNOSIS — N18.32 STAGE 3B CHRONIC KIDNEY DISEASE: Chronic | ICD-10-CM

## 2022-01-25 PROCEDURE — 99213 OFFICE O/P EST LOW 20 MIN: CPT | Performed by: INTERNAL MEDICINE

## 2022-01-25 RX ORDER — CLONIDINE HYDROCHLORIDE 0.1 MG/1
0.05 TABLET ORAL 2 TIMES DAILY
Qty: 30 TABLET | Refills: 6 | Status: SHIPPED | OUTPATIENT
Start: 2022-01-25 | End: 2022-05-02 | Stop reason: SDUPTHER

## 2022-01-25 NOTE — PROGRESS NOTES
"Chief Complaint  Hypertension    Subjective          History of Present Illness     Rashaun Quintanilla presents to the clinic to follow up on rather difficult to control hypertension.  Patient was recently diagnosed with right renal cell carcinoma, for which he underwent right radical nephrectomy and massive IVC thrombectomy at [place] to address large right renal mass and massive inferior vena cava thrombosis, successfully addressed with surgical nephrectomy and thrombectomy, although complicated by atrial fibrillation.  With his visit last month, I increased hydralazine to 50 mg 3 times daily in addition to Diovan 320 mg daily , Toprol  mg daily, and nifedipine.. Despite this change, he states BP continues to run high. His BP diary after the change reveals systolic consistently remains >150 the majority of times.  Denies increased edema.  Denies orthopnea.  We tried adding clonidine in the past, but it caused excessive sedation.  His visit with Ruba Bacon, urology, was rescheduled for next Monday due to winter weather.         When patient was in last month, his weight was up 27 pounds in the prior 4 months with BMI 29.  I gave patient a weight loss goal of 20 pounds.  His weight is actually up 5 pounds in the past month with BMI near 30.  I continue to encourage regular walking for exercise in combination with aggressive calorie reduction and other dietary efforts to achieve weight loss.  He is not yet meeting those goals.    Objective   Vital Signs:   /90   Pulse 68   Temp 97.5 °F (36.4 °C) (Tympanic)   Ht 185.4 cm (73\")   Wt 102 kg (225 lb)   BMI 29.69 kg/m²         Physical Exam  Constitutional:       General: He is not in acute distress.     Appearance: He is well-developed.      Comments: Very pleasant, intelligent gentleman, overweight.     HENT:      Head: Normocephalic and atraumatic.      Nose: Nose normal.      Mouth/Throat:      Pharynx: No oropharyngeal exudate.   Eyes:      " Pupils: Pupils are equal, round, and reactive to light.   Neck:      Thyroid: No thyromegaly.      Vascular: No JVD.   Cardiovascular:      Rate and Rhythm: Normal rate and regular rhythm.      Heart sounds: Normal heart sounds.   Pulmonary:      Effort: Pulmonary effort is normal. No accessory muscle usage or respiratory distress.      Breath sounds: Normal breath sounds. No wheezing or rales.   Abdominal:      General: Bowel sounds are normal. There is no distension.      Palpations: Abdomen is soft.      Tenderness: There is no abdominal tenderness.      Comments: Overweight abdomen.    Musculoskeletal:      Cervical back: Neck supple.      Right lower leg: Edema present.      Left lower leg: Edema present.      Comments: Trace edema bilateral ankles   Lymphadenopathy:      Cervical: No cervical adenopathy.   Neurological:      Mental Status: He is alert and oriented to person, place, and time.      Cranial Nerves: No cranial nerve deficit.   Psychiatric:         Speech: Speech normal.         Behavior: Behavior normal.         Thought Content: Thought content normal.         Judgment: Judgment normal.          Result Review :     Common labs    Common Labsle 10/15/21 12/29/21 12/29/21 12/29/21 12/29/21 12/29/21 1/10/22 1/10/22     0707 0707 0707 0707 0707 0722 0722   Glucose 113 (A)  103 (A)     114 (A)   BUN 19  21     21   Creatinine 1.91 (A)  1.77 (A)     1.57 (A)   eGFR Non  Am 36 (A)  39 (A)     45 (A)   Sodium 141  139     139   Potassium 4.3  4.1     4.0   Chloride 106  103     104   Calcium 9.7  9.4     9.7   Albumin 4.30  4.10     4.30   Total Bilirubin 0.7  0.9     0.9   Alkaline Phosphatase 68  65     63   AST (SGOT) 21  23     24   ALT (SGPT) 19  22     22   WBC  6.16     4.82    Hemoglobin  12.7 (A)     13.1    Hematocrit  38.6     40.1    Platelets  193     198    Total Cholesterol    126 (A)       Triglycerides    103       HDL Cholesterol    35 (A)       LDL Cholesterol     72        Hemoglobin A1C     5.07      PSA      5.930 (A)     (A) Abnormal value            Data reviewed: Consultant notes Dr.Amy Bacon, [facility]       Future Appointments   Date Time Provider Department Center   5/2/2022  8:00 AM Allen Hammonds MD [facility]        Assessment and Plan    Diagnoses and all orders for this visit:    1. Essential hypertension (Primary)    2. Stage 3b chronic kidney disease (HCC)    3. Overweight (BMI 25.0-29.9)    4. Renal cell carcinoma of right kidney (CMS/HCC) - S/P R radical nephrectomy 6/16/2021    Other orders  -     cloNIDine (Catapres) 0.1 MG tablet; Take 0.5 tablets by mouth 2 (Two) Times a Day.  Dispense: 30 tablet; Refill: 6         I spent 23 minutes caring for Rashaun on this date of service. This time includes time spent by me in the following activities:preparing for the visit, reviewing tests, obtaining and/or reviewing a separately obtained history, performing a medically appropriate examination and/or evaluation , counseling and educating the patient/family/caregiver, ordering medications, tests, or procedures and documenting information in the medical record     We are trying to optimize his blood pressure in this patient who recently underwent radical nephrectomy and massive IVC thrombectomy at [facility] to address large right renal mass/renal carcinoma.  Patient is in agreement to try clonidine, although, we will start a lower dose due to excessive sedation with the whole tablet in the past. I sent prescription for clonidine 0.1 mg to take 1/2 tablet b.i.d.  Continue with his other current blood pressure medications as prescribed including the nifedipine, valsartan, metoprolol XL, hydralazine 50 mg t.i.d.   Continue to monitor BP and keep a diary to bring by in a week.  If he tolerates the lower dose of clonidine, we can increase to the whole 1 mg tablet b.i.d.  If he cannot tolerate the clonidine or this does not get BP to goal, our next step is to  double the dose of hydralazine to 100 mg b.i.d.  Continue to avoid NSAIDs and other nephrotoxic drugs in this patient now with single kidney and stage IIIb CKD.  I continue to encourage regular exercise and aggressive dietary efforts to help achieve weight loss.   Keep urology appointment scheduled for next Monday with Ruba Bacon, [facility].     If blood pressure is not at goal upon review of his diabetic diary next week, I will either double his dose of hydralazine or add a very low-dose diuretic.  I have been trying to avoid diuretics due to his chronic kidney disease, but he does have trace edema at the ankles.    Return in May for routine follow up with fasting labs one week prior or sooner if needed.     Scribed for Dr. Hammonds by Arlette Redd [place].     Follow Up   Return for Next scheduled follow up.  Patient was given instructions and counseling regarding his condition or for health maintenance advice. Please see specific information pulled into the AVS if appropriate.

## 2022-02-11 ENCOUNTER — TRANSCRIBE ORDERS (OUTPATIENT)
Dept: CT IMAGING | Facility: CLINIC | Age: 62
End: 2022-02-11

## 2022-02-11 DIAGNOSIS — C64.1 RENAL CANCER, RIGHT: Primary | ICD-10-CM

## 2022-04-18 ENCOUNTER — LAB (OUTPATIENT)
Dept: LAB | Facility: OTHER | Age: 62
End: 2022-04-18

## 2022-04-18 DIAGNOSIS — C64.1 RENAL CANCER, RIGHT: ICD-10-CM

## 2022-04-18 LAB
ALBUMIN SERPL-MCNC: 4.2 G/DL (ref 3.5–5)
ALBUMIN/GLOB SERPL: 1.4 G/DL (ref 1.1–1.8)
ALP SERPL-CCNC: 62 U/L (ref 38–126)
ALT SERPL W P-5'-P-CCNC: 20 U/L
ANION GAP SERPL CALCULATED.3IONS-SCNC: 8 MMOL/L (ref 5–15)
AST SERPL-CCNC: 23 U/L (ref 17–59)
BASOPHILS # BLD AUTO: 0.06 10*3/MM3 (ref 0–0.2)
BASOPHILS NFR BLD AUTO: 1.1 % (ref 0–1.5)
BILIRUB SERPL-MCNC: 0.9 MG/DL (ref 0.2–1.3)
BUN SERPL-MCNC: 19 MG/DL (ref 7–23)
BUN/CREAT SERPL: 13 (ref 7–25)
CALCIUM SPEC-SCNC: 9.8 MG/DL (ref 8.4–10.2)
CHLORIDE SERPL-SCNC: 104 MMOL/L (ref 101–112)
CO2 SERPL-SCNC: 28 MMOL/L (ref 22–30)
CREAT SERPL-MCNC: 1.46 MG/DL (ref 0.7–1.3)
DEPRECATED RDW RBC AUTO: 45.1 FL (ref 37–54)
EGFRCR SERPLBLD CKD-EPI 2021: 53.4 ML/MIN/1.73
EOSINOPHIL # BLD AUTO: 0.24 10*3/MM3 (ref 0–0.4)
EOSINOPHIL NFR BLD AUTO: 4.4 % (ref 0.3–6.2)
ERYTHROCYTE [DISTWIDTH] IN BLOOD BY AUTOMATED COUNT: 13.6 % (ref 12.3–15.4)
GLOBULIN UR ELPH-MCNC: 3 GM/DL (ref 2.3–3.5)
GLUCOSE SERPL-MCNC: 110 MG/DL (ref 70–99)
HCT VFR BLD AUTO: 40.9 % (ref 37.5–51)
HGB BLD-MCNC: 13.7 G/DL (ref 13–17.7)
LYMPHOCYTES # BLD AUTO: 1.52 10*3/MM3 (ref 0.7–3.1)
LYMPHOCYTES NFR BLD AUTO: 27.7 % (ref 19.6–45.3)
MCH RBC QN AUTO: 30.9 PG (ref 26.6–33)
MCHC RBC AUTO-ENTMCNC: 33.5 G/DL (ref 31.5–35.7)
MCV RBC AUTO: 92.1 FL (ref 79–97)
MONOCYTES # BLD AUTO: 0.61 10*3/MM3 (ref 0.1–0.9)
MONOCYTES NFR BLD AUTO: 11.1 % (ref 5–12)
NEUTROPHILS NFR BLD AUTO: 3.06 10*3/MM3 (ref 1.7–7)
NEUTROPHILS NFR BLD AUTO: 55.7 % (ref 42.7–76)
PLATELET # BLD AUTO: 206 10*3/MM3 (ref 140–450)
PMV BLD AUTO: 10.4 FL (ref 6–12)
POTASSIUM SERPL-SCNC: 4 MMOL/L (ref 3.4–5)
PROT SERPL-MCNC: 7.2 G/DL (ref 6.3–8.6)
RBC # BLD AUTO: 4.44 10*6/MM3 (ref 4.14–5.8)
SODIUM SERPL-SCNC: 140 MMOL/L (ref 137–145)
WBC NRBC COR # BLD: 5.49 10*3/MM3 (ref 3.4–10.8)

## 2022-04-18 PROCEDURE — 85025 COMPLETE CBC W/AUTO DIFF WBC: CPT | Performed by: INTERNAL MEDICINE

## 2022-04-18 PROCEDURE — 80053 COMPREHEN METABOLIC PANEL: CPT | Performed by: INTERNAL MEDICINE

## 2022-04-18 PROCEDURE — 36415 COLL VENOUS BLD VENIPUNCTURE: CPT | Performed by: INTERNAL MEDICINE

## 2022-04-26 ENCOUNTER — TRANSCRIBE ORDERS (OUTPATIENT)
Dept: GENERAL RADIOLOGY | Facility: CLINIC | Age: 62
End: 2022-04-26

## 2022-04-26 ENCOUNTER — LAB (OUTPATIENT)
Dept: LAB | Facility: OTHER | Age: 62
End: 2022-04-26

## 2022-04-26 DIAGNOSIS — N18.32 ANEMIA DUE TO STAGE 3B CHRONIC KIDNEY DISEASE: Chronic | ICD-10-CM

## 2022-04-26 DIAGNOSIS — D63.1 ANEMIA DUE TO STAGE 3B CHRONIC KIDNEY DISEASE: Chronic | ICD-10-CM

## 2022-04-26 DIAGNOSIS — E78.2 MIXED HYPERLIPIDEMIA: Chronic | ICD-10-CM

## 2022-04-26 DIAGNOSIS — C64.1 RENAL CELL CARCINOMA OF RIGHT KIDNEY: Primary | ICD-10-CM

## 2022-04-26 DIAGNOSIS — D51.3 OTHER DIETARY VITAMIN B12 DEFICIENCY ANEMIA: Chronic | ICD-10-CM

## 2022-04-26 DIAGNOSIS — R97.20 ELEVATED PSA, LESS THAN 10 NG/ML: Chronic | ICD-10-CM

## 2022-04-26 LAB
25(OH)D3 SERPL-MCNC: 31.8 NG/ML (ref 30–100)
ARTICHOKE IGE QN: 79 MG/DL (ref 0–100)
FERRITIN SERPL-MCNC: 37.3 NG/ML (ref 30–400)
FOLATE SERPL-MCNC: 7.86 NG/ML (ref 4.78–24.2)
IRON 24H UR-MRATE: 102 MCG/DL (ref 59–158)
IRON SATN MFR SERPL: 25 % (ref 20–50)
PSA SERPL-MCNC: 4.86 NG/ML (ref 0–4)
TIBC SERPL-MCNC: 402 MCG/DL (ref 298–536)
TRANSFERRIN SERPL-MCNC: 270 MG/DL (ref 200–360)
VIT B12 BLD-MCNC: 775 PG/ML (ref 211–946)

## 2022-04-26 PROCEDURE — 83721 ASSAY OF BLOOD LIPOPROTEIN: CPT | Performed by: INTERNAL MEDICINE

## 2022-04-26 PROCEDURE — 84153 ASSAY OF PSA TOTAL: CPT | Performed by: INTERNAL MEDICINE

## 2022-04-26 PROCEDURE — 84466 ASSAY OF TRANSFERRIN: CPT | Performed by: INTERNAL MEDICINE

## 2022-04-26 PROCEDURE — 36415 COLL VENOUS BLD VENIPUNCTURE: CPT | Performed by: INTERNAL MEDICINE

## 2022-04-26 PROCEDURE — 83540 ASSAY OF IRON: CPT | Performed by: INTERNAL MEDICINE

## 2022-04-26 PROCEDURE — 82607 VITAMIN B-12: CPT | Performed by: INTERNAL MEDICINE

## 2022-04-26 PROCEDURE — 82728 ASSAY OF FERRITIN: CPT | Performed by: INTERNAL MEDICINE

## 2022-04-26 PROCEDURE — 82306 VITAMIN D 25 HYDROXY: CPT | Performed by: INTERNAL MEDICINE

## 2022-04-26 PROCEDURE — 82746 ASSAY OF FOLIC ACID SERUM: CPT | Performed by: INTERNAL MEDICINE

## 2022-05-02 ENCOUNTER — OFFICE VISIT (OUTPATIENT)
Dept: FAMILY MEDICINE CLINIC | Facility: CLINIC | Age: 62
End: 2022-05-02

## 2022-05-02 VITALS
DIASTOLIC BLOOD PRESSURE: 86 MMHG | BODY MASS INDEX: 29.13 KG/M2 | SYSTOLIC BLOOD PRESSURE: 146 MMHG | HEART RATE: 64 BPM | HEIGHT: 73 IN | WEIGHT: 219.8 LBS | TEMPERATURE: 96.9 F

## 2022-05-02 DIAGNOSIS — E78.2 MIXED HYPERLIPIDEMIA: Chronic | ICD-10-CM

## 2022-05-02 DIAGNOSIS — E78.1 HYPERTRIGLYCERIDEMIA: Chronic | ICD-10-CM

## 2022-05-02 DIAGNOSIS — N18.31 STAGE 3A CHRONIC KIDNEY DISEASE: Chronic | ICD-10-CM

## 2022-05-02 DIAGNOSIS — C64.1 RENAL CELL CARCINOMA OF RIGHT KIDNEY: Chronic | ICD-10-CM

## 2022-05-02 DIAGNOSIS — R97.20 ELEVATED PSA, LESS THAN 10 NG/ML: Chronic | ICD-10-CM

## 2022-05-02 DIAGNOSIS — D51.3 OTHER DIETARY VITAMIN B12 DEFICIENCY ANEMIA: Chronic | ICD-10-CM

## 2022-05-02 DIAGNOSIS — I10 ESSENTIAL HYPERTENSION: Primary | Chronic | ICD-10-CM

## 2022-05-02 DIAGNOSIS — E66.3 OVERWEIGHT (BMI 25.0-29.9): Chronic | ICD-10-CM

## 2022-05-02 DIAGNOSIS — K21.9 GASTROESOPHAGEAL REFLUX DISEASE WITHOUT ESOPHAGITIS: Chronic | ICD-10-CM

## 2022-05-02 DIAGNOSIS — R73.01 IMPAIRED FASTING GLUCOSE: Chronic | ICD-10-CM

## 2022-05-02 DIAGNOSIS — I49.3 VENTRICULAR PREMATURE DEPOLARIZATION: Chronic | ICD-10-CM

## 2022-05-02 PROBLEM — D69.6 THROMBOCYTOPENIA: Chronic | Status: RESOLVED | Noted: 2020-12-15 | Resolved: 2022-05-02

## 2022-05-02 PROCEDURE — 99214 OFFICE O/P EST MOD 30 MIN: CPT | Performed by: INTERNAL MEDICINE

## 2022-05-02 RX ORDER — CLONIDINE HYDROCHLORIDE 0.1 MG/1
TABLET ORAL
Qty: 180 TABLET | Refills: 3 | Status: SHIPPED | OUTPATIENT
Start: 2022-05-02 | End: 2023-03-22 | Stop reason: SDUPTHER

## 2022-05-02 NOTE — PROGRESS NOTES
Chief Complaint  Follow-up (Labs and b/p readings)    Subjective          History of Present Illness     Rashaun Quintanilla presents to the office for follow up on multiple complex chronic medical issues including hypertension, stage III chronic kidney disease due to right nephrectomy, impaired fasting glucose, essential hypertension, hyperlipidemia, mildly elevated PSA, and GERD among other issues.  He was diagnosed with right renal cell carcinoma 06/2021, for which he underwent right radical nephrectomy and massive IVC thrombectomy at [facility] to address large right renal mass and massive inferior vena cava thrombosis, successfully addressed with surgical nephrectomy and thrombectomy.  His postoperative period was complicated by atrial fibrillation, for which he continues on aspirin and Toprol-XL.  Dr. Marino is his cardiologist currently, although, he will be relocating and patient will be establishing with another cardiologist. Patient continues to follow with Ruba Bacon, urologist to follow history of right renal cell carcinoma.  Renal function continues to gradually improve to stage 3A CKD currently.  He continues to avoid chronic NSAIDs.  His PSA was elevated at 5.9 when last checked 12/2021.  Diagnostic PSA with a set of labs is improved at 4.8.    Patient takes Pepcid approximately twice a week on average to manage occasional GERD flares.      Weight is stable from 4 months ago, although, down 6 pounds in the past four months.  He admits he is not meeting diet and exercise goals.  He has been getting a lot of empty calories by drinking sweet tea at [employer], which I have asked him to eliminate.   BP is improved.  He brings in BP diary for the last month revealing 3 occasional episodes of BP over 140 with average in 130s since adding clonidine.  Dry mouth symptoms are tolerable with the clonidine.  Denies constipation.  He admits he is not meeting exercise and weight loss goals.       The patient's  "relevant past medical, surgical, and social history was reviewed in Epic. Lab results are reviewed with the patient today.  CBC unremarkable. LDL 79 with dietary efforts.  Vitamin B-12 at goal with daily oral B-12.  Normal liver function.  Renal function improved as above.  Iron levels at goal without oral iron.  Vitamin D acceptable.     Objective   Vital Signs:   /86   Pulse 64   Temp 96.9 °F (36.1 °C) (Tympanic)   Ht 185.4 cm (73\")   Wt 99.7 kg (219 lb 12.8 oz)   BMI 29.00 kg/m²       Physical Exam  Vitals reviewed.   Constitutional:       General: He is not in acute distress.     Appearance: He is well-developed.      Comments: Pleasant male, overweight.     HENT:      Head: Normocephalic and atraumatic.      Nose:      Right Sinus: No maxillary sinus tenderness or frontal sinus tenderness.      Left Sinus: No maxillary sinus tenderness or frontal sinus tenderness.      Mouth/Throat:      Mouth: No oral lesions.      Pharynx: Uvula midline.      Tonsils: No tonsillar exudate.   Eyes:      Conjunctiva/sclera: Conjunctivae normal.      Pupils: Pupils are equal, round, and reactive to light.   Neck:      Thyroid: No thyroid mass or thyromegaly.      Vascular: No carotid bruit or JVD.      Trachea: Trachea normal. No tracheal deviation.   Cardiovascular:      Rate and Rhythm: Normal rate and regular rhythm.  No extrasystoles are present.     Chest Wall: PMI is not displaced.      Heart sounds: Normal heart sounds. No murmur heard.  Pulmonary:      Effort: Pulmonary effort is normal. No accessory muscle usage or respiratory distress.      Breath sounds: Normal breath sounds. No decreased breath sounds, wheezing, rhonchi or rales.   Abdominal:      General: Bowel sounds are normal. There is no distension.      Palpations: Abdomen is soft.      Tenderness: There is no abdominal tenderness.   Musculoskeletal:      Cervical back: Neck supple.   Lymphadenopathy:      Cervical: No cervical adenopathy.   Skin:    "  General: Skin is warm and dry.      Findings: No rash.      Nails: There is no clubbing.      Comments: Multiple varicosities bilateral lower extremities.     Sun-damaged skin.   Neurological:      Mental Status: He is alert and oriented to person, place, and time.      Cranial Nerves: No cranial nerve deficit.      Coordination: Coordination normal.   Psychiatric:         Speech: Speech normal.         Behavior: Behavior normal.         Thought Content: Thought content normal.         Judgment: Judgment normal.            Result Review :     CMP    CMP 12/29/21 1/10/22 4/18/22   Glucose 103 (A) 114 (A) 110 (A)   BUN 21 21 19   Creatinine 1.77 (A) 1.57 (A) 1.46 (A)   eGFR Non  Am 39 (A) 45 (A)    Sodium 139 139 140   Potassium 4.1 4.0 4.0   Chloride 103 104 104   Calcium 9.4 9.7 9.8   Albumin 4.10 4.30 4.20   Total Bilirubin 0.9 0.9 0.9   Alkaline Phosphatase 65 63 62   AST (SGOT) 23 24 23   ALT (SGPT) 22 22 20   (A) Abnormal value            CBC w/diff    CBC w/Diff 12/29/21 1/10/22 4/18/22   WBC 6.16 4.82 5.49   RBC 4.13 (A) 4.32 4.44   Hemoglobin 12.7 (A) 13.1 13.7   Hematocrit 38.6 40.1 40.9   MCV 93.5 92.8 92.1   MCH 30.8 30.3 30.9   MCHC 32.9 32.7 33.5   RDW 13.4 13.6 13.6   Platelets 193 198 206   Neutrophil Rel % 59.2 59.4 55.7   Lymphocyte Rel % 25.6 25.5 27.7   Monocyte Rel % 10.1 9.3 11.1   Eosinophil Rel % 4.1 3.7 4.4   Basophil Rel % 1.0 2.1 (A) 1.1   (A) Abnormal value            Lipid Panel    Lipid Panel 8/5/21 8/5/21 12/29/21 4/26/22    0800 0800     Total Cholesterol   126 (A)    Triglycerides 66  103    HDL Cholesterol   35 (A)    VLDL Cholesterol   19    LDL Cholesterol   62 72 79   LDL/HDL Ratio   2.01    (A) Abnormal value            TSH    TSH 6/20/21 12/29/21   TSH 1.340 2.010      Comments are available for some flowsheets but are not being displayed.           A1C Last 3 Results    HGBA1C Last 3 Results 5/26/21 8/5/21 12/29/21   Hemoglobin A1C 5.4 4.90 5.07      Comments are  available for some flowsheets but are not being displayed.           PSA    PSA 5/10/21 12/29/21 4/26/22   PSA 4.23 (A) 5.930 (A) 4.860 (A)   (A) Abnormal value       Comments are available for some flowsheets but are not being displayed.           Data reviewed: Consultant notes Ruba Bacon, urologist, [place], Dr. Marino, cardiologist.          Assessment and Plan    Diagnoses and all orders for this visit:    1. Essential hypertension (Primary)  -     CBC Auto Differential; Future  -     Comprehensive Metabolic Panel; Future    2. Mixed hyperlipidemia  -     Comprehensive Metabolic Panel; Future  -     Lipid Panel; Future  -     TSH; Future    3. Hypertriglyceridemia  -     Lipid Panel; Future    4. Impaired fasting glucose  -     Comprehensive Metabolic Panel; Future  -     Hemoglobin A1c; Future    5. Other dietary vitamin B12 deficiency anemia  -     CBC Auto Differential; Future  -     Vitamin B12; Future    6. Ventricular premature depolarization  -     Comprehensive Metabolic Panel; Future    7. Gastroesophageal reflux disease without esophagitis  -     CBC Auto Differential; Future    8. Stage 3a chronic kidney disease (HCC)  -     Comprehensive Metabolic Panel; Future    9. Renal cell carcinoma of right kidney (CMS/HCC) - S/P R radical nephrectomy 6/16/2021  -     CBC Auto Differential; Future  -     Comprehensive Metabolic Panel; Future    10. Overweight (BMI 25.0-29.9)  -     Comprehensive Metabolic Panel; Future    11. Elevated PSA, less than 10 ng/ml   -     PSA DIAGNOSTIC; Future    Other orders  -     cloNIDine (Catapres) 0.1 MG tablet; 1/2-1 tab po bid for hypertension  Dispense: 180 tablet; Refill: 3      BMI is >= 25 and < 30. (Overweight) The following options were offered after discussion: weight loss educational material (shared in after visit summary)       I spent 31 minutes caring for Rashaun on this date of service. This time includes time spent by me in the following  activities:preparing for the visit, reviewing tests, obtaining and/or reviewing a separately obtained history, performing a medically appropriate examination and/or evaluation , counseling and educating the patient/family/caregiver, ordering medications, tests, or procedures and documenting information in the medical record     BP improved.  We will have him continue the current BP medications, although, will send a prescription to allow him to take an extra 1/2 tablet of clonidine for systolic  or >.  If needed in the future, we can increase his hydralazine dose or change Toprol-XL to higher dose Ziac in the future, if needed.  We reviewed diet and exercise goals to help patient achieve approximately 15-pound weight loss goal.  He can easily eliminate empty calories by stopping the sweet tea.  With the weight loss, we will more than likely be able to stop the clonidine.  Recommended daily walking gradually increasing time and distance.     Continue to follow with Ruba Bacon, urologist to follow history of right renal cell carcinoma.  Renal function continues to gradually improve and currently stage 3A.  I screened for vitamin D deficiency with a set of labs, and vitamin D level is adequate.  The diagnostic PSA is lower, but still mildly elevated at 4.8.  We will continue to follow with diagnostic PSA every 6 months.    Continue cardiology follow up visits.  LDL at goal with dietary efforts.  He will be establishing with another cardiologist with Dr. Jamila walker.  He can notify us if he needs a referral.       Continue the daily vitamin B-12.  Vitamin B-12 acceptable.     Continue Pepcid for GERD, which he is currently taking approximately twice weekly.      Return in six months for routine follow up with fasting labs one week prior or sooner if needed.     Scribed for Dr. Hammonds by Arlette Redd [facility].     Follow Up   Return in about 6 months (around 11/2/2022) for Follow up in six months with  labs one week prior..  Patient was given instructions and counseling regarding his condition or for health maintenance advice. Please see specific information pulled into the AVS if appropriate.

## 2022-05-26 ENCOUNTER — OFFICE VISIT (OUTPATIENT)
Dept: FAMILY MEDICINE CLINIC | Facility: CLINIC | Age: 62
End: 2022-05-26

## 2022-05-26 VITALS
DIASTOLIC BLOOD PRESSURE: 80 MMHG | TEMPERATURE: 96.6 F | WEIGHT: 224 LBS | SYSTOLIC BLOOD PRESSURE: 136 MMHG | HEART RATE: 64 BPM | HEIGHT: 73 IN | BODY MASS INDEX: 29.69 KG/M2

## 2022-05-26 DIAGNOSIS — E66.3 OVERWEIGHT (BMI 25.0-29.9): Chronic | ICD-10-CM

## 2022-05-26 DIAGNOSIS — N18.31 STAGE 3A CHRONIC KIDNEY DISEASE: Chronic | ICD-10-CM

## 2022-05-26 DIAGNOSIS — H69.81 ETD (EUSTACHIAN TUBE DYSFUNCTION), RIGHT: ICD-10-CM

## 2022-05-26 DIAGNOSIS — I10 ESSENTIAL HYPERTENSION: Chronic | ICD-10-CM

## 2022-05-26 DIAGNOSIS — J30.1 SEASONAL ALLERGIC RHINITIS DUE TO POLLEN: Chronic | ICD-10-CM

## 2022-05-26 DIAGNOSIS — R42 VERTIGO: Primary | ICD-10-CM

## 2022-05-26 DIAGNOSIS — R73.01 IMPAIRED FASTING GLUCOSE: Chronic | ICD-10-CM

## 2022-05-26 PROCEDURE — 99214 OFFICE O/P EST MOD 30 MIN: CPT | Performed by: INTERNAL MEDICINE

## 2022-05-26 RX ORDER — PHENYLEPHRINE HCL 10 MG/1
TABLET, FILM COATED ORAL
Qty: 15 TABLET | Refills: 0 | Status: SHIPPED | OUTPATIENT
Start: 2022-05-26 | End: 2022-11-29

## 2022-05-26 RX ORDER — PREDNISONE 20 MG/1
20 TABLET ORAL EVERY MORNING
Qty: 7 TABLET | Refills: 0 | Status: SHIPPED | OUTPATIENT
Start: 2022-05-26 | End: 2022-08-02

## 2022-05-26 RX ORDER — MECLIZINE HYDROCHLORIDE 25 MG/1
25 TABLET ORAL 3 TIMES DAILY PRN
Qty: 30 TABLET | Refills: 0 | Status: SHIPPED | OUTPATIENT
Start: 2022-05-26

## 2022-05-26 NOTE — PROGRESS NOTES
Answers for HPI/ROS submitted by the patient on 5/24/2022  What is the primary reason for your visit?: Other  Please describe your symptoms.: Dizziness  Have you had these symptoms before?: No  How long have you been having these symptoms?: 1-4 days  Please list any medications you are currently taking for this condition.: none    Chief Complaint  Dizziness (While lying in bed the room is spinning x several days)    Subjective          History of Present Illness     Rashaun Quintanilla is our 62-year-old male with multiple complex chronic medical issues who was diagnosed with right renal cell carcinoma 06/2021, for which he underwent right radical nephrectomy and massive IVC thrombectomy at [facility] to address large right renal mass and massive inferior vena cava thrombosis, successfully addressed with surgical nephrectomy and thrombectomy.  His postoperative period was complicated by atrial fibrillation, for which he continues on aspirin and Toprol-XL.   He is requiring multiple blood pressure medications and we have been encouraging him to aggressively lose weight to help with blood pressure management and also delay progression of IFG    He presents to the office today describing symptoms of vertigo and some ETD.  Four nights ago, he had onset of dizziness after he had gotten up out of bed to use the restroom.  Denies palpitations or diaphoresis.  Denies any stroke symptoms.  Episodes normally last no more than 60 seconds, without significant nausea or diaphoresis.  His BP and HR at goal in the office today.  Weight is up 5 pounds in the past month with BMI 29.6.  In discussion, he has had recent cough and increased seasonal allergy symptoms, worse after mowing the lawn,  which could have been a trigger. Clear effusion behind the TM on the right on exam today. [place] some ear [place] and right ear felt a little sore on one day. He takes generic Zyrtec episodically and is not using  "his Flonase nasal spray much, only once this week.    BP at goal in the office today as well as with home monitoring.  He is made aware decongestants may temporarily elevate BP.        Objective   Vital Signs:  /80   Pulse 64   Temp 96.6 °F (35.9 °C) (Tympanic)   Ht 185.4 cm (73\")   Wt 102 kg (224 lb)   BMI 29.55 kg/m²         Physical Exam  Constitutional:       General: He is not in acute distress.     Appearance: He is well-developed.      Comments: Pleasant male, overweight.    HENT:      Head: Normocephalic and atraumatic.      Ears:      Comments: Exam of right ear canal reveals clear effusion 50% of tympanic membrane.          Nose: Nose normal.      Mouth/Throat:      Pharynx: No oropharyngeal exudate.      Comments: Clear postnasal drainage.   Eyes:      Pupils: Pupils are equal, round, and reactive to light.   Neck:      Thyroid: No thyromegaly.      Vascular: No JVD.   Cardiovascular:      Rate and Rhythm: Normal rate and regular rhythm.      Heart sounds: Normal heart sounds.   Pulmonary:      Effort: Pulmonary effort is normal. No accessory muscle usage or respiratory distress.      Breath sounds: Normal breath sounds. No wheezing or rales.   Abdominal:      General: Bowel sounds are normal. There is no distension.      Palpations: Abdomen is soft.      Tenderness: There is no abdominal tenderness.   Musculoskeletal:      Cervical back: Neck supple.   Lymphadenopathy:      Cervical: No cervical adenopathy.   Neurological:      Mental Status: He is alert and oriented to person, place, and time.      Cranial Nerves: No cranial nerve deficit.      Comments: Positive Serg-Hallpike test.      Psychiatric:         Speech: Speech normal.         Behavior: Behavior normal.         Thought Content: Thought content normal.         Judgment: Judgment normal.            Result Review :                Future Appointments   Date Time Provider Department Center   7/18/2022  9:00 AM [facility] PWD CT 1 [facility] CT POW " [place]   7/18/2022  9:30 AM [facility] PWD CT 1 [facility] CT POW [place]   11/9/2022  8:00 AM Allen Hammonds MD [facility] POW [facility]        Assessment and Plan    Diagnoses and all orders for this visit:    1. Vertigo (Primary)    2. ETD (Eustachian tube dysfunction), right    3. Essential hypertension    4. Overweight (BMI 25.0-29.9)    5. Impaired fasting glucose    6. Seasonal allergic rhinitis due to pollen    7. Stage 3a chronic kidney disease (HCC) - S/P R nephrectomy    Other orders  -     phenylephrine (SUDAFED PE) 10 MG tablet; 1 po qam prn congestion or ear pressure or dizziness  Dispense: 15 tablet; Refill: 0  -     meclizine (ANTIVERT) 25 MG tablet; Take 1 tablet by mouth 3 (Three) Times a Day As Needed for Dizziness.  Dispense: 30 tablet; Refill: 0  -     predniSONE (DELTASONE) 20 MG tablet; Take 1 tablet by mouth Every Morning.  Dispense: 7 tablet; Refill: 0           I spent 31 minutes caring for Rashaun on this date of service. This time includes time spent by me in the following activities:preparing for the visit, reviewing tests, obtaining and/or reviewing a separately obtained history, performing a medically appropriate examination and/or evaluation , counseling and educating the patient/family/caregiver, ordering medications, tests, or procedures and documenting information in the medical record     The treatment choices and medication doses today are made considering his stage III CKD due to unilateral kidney and also his hypertension and IFG.    For the benign positional vertigo/eustachian tube dysfunction, I sent prescriptions for meclizine 25 mg t.i.d. as needed, prednisone 20 mg q.a.m. x 7 days, and Sudafed PE 10 mg to take 1 po qam prn congestion or ear pressure or dizziness.   I recommended he resume daily use of the Zyrtec 10 mg nightly and use his Flonase one spray each nostril b.i.d. to help manage seasonal allergy symptoms, reduce postnasal drip, and improve eustachian tube function. Recommended he  research Epley maneuvers on the Internet and information regarding benign positional vertigo online.  If symptoms persist with above mentioned treatment, we can refer to PT for Epley maneuvers teaching.     Continue current BP medications.  Monitor BP, which can be elevated with the decongestants, but I am just using a low-dose decongestant each morning.   Reduce carbohydrates and concentrated sweets while on the prednisone due to IFG.  We will continue to encourage weight loss with goal of getting his BMI around 25 or 26.    Return in November for routine follow up with fasting labs one week prior or sooner if needed.     Scribed for Dr. Hammonds by Arlette Redd [facility].     Follow Up   Return if symptoms worsen or fail to improve, for Next scheduled follow up.  Patient was given instructions and counseling regarding his condition or for health maintenance advice. Please see specific information pulled into the AVS if appropriate.

## 2022-07-18 ENCOUNTER — LAB (OUTPATIENT)
Dept: LAB | Facility: OTHER | Age: 62
End: 2022-07-18

## 2022-07-18 DIAGNOSIS — C64.1 RENAL CELL CARCINOMA OF RIGHT KIDNEY: ICD-10-CM

## 2022-07-18 LAB
ALBUMIN SERPL-MCNC: 4.3 G/DL (ref 3.5–5)
ALBUMIN/GLOB SERPL: 1.6 G/DL (ref 1.1–1.8)
ALP SERPL-CCNC: 64 U/L (ref 38–126)
ALT SERPL W P-5'-P-CCNC: 21 U/L
ANION GAP SERPL CALCULATED.3IONS-SCNC: 9 MMOL/L (ref 5–15)
AST SERPL-CCNC: 22 U/L (ref 17–59)
BASOPHILS # BLD AUTO: 0.03 10*3/MM3 (ref 0–0.2)
BASOPHILS NFR BLD AUTO: 0.6 % (ref 0–1.5)
BILIRUB SERPL-MCNC: 0.8 MG/DL (ref 0.2–1.3)
BUN SERPL-MCNC: 19 MG/DL (ref 7–23)
BUN/CREAT SERPL: 12.9 (ref 7–25)
CALCIUM SPEC-SCNC: 9.6 MG/DL (ref 8.4–10.2)
CHLORIDE SERPL-SCNC: 105 MMOL/L (ref 101–112)
CO2 SERPL-SCNC: 28 MMOL/L (ref 22–30)
CREAT SERPL-MCNC: 1.47 MG/DL (ref 0.7–1.3)
DEPRECATED RDW RBC AUTO: 45 FL (ref 37–54)
EGFRCR SERPLBLD CKD-EPI 2021: 52.9 ML/MIN/1.73
EOSINOPHIL # BLD AUTO: 0.19 10*3/MM3 (ref 0–0.4)
EOSINOPHIL NFR BLD AUTO: 3.9 % (ref 0.3–6.2)
ERYTHROCYTE [DISTWIDTH] IN BLOOD BY AUTOMATED COUNT: 13.5 % (ref 12.3–15.4)
GLOBULIN UR ELPH-MCNC: 2.7 GM/DL (ref 2.3–3.5)
GLUCOSE SERPL-MCNC: 111 MG/DL (ref 70–99)
HCT VFR BLD AUTO: 41.5 % (ref 37.5–51)
HGB BLD-MCNC: 13.5 G/DL (ref 13–17.7)
LYMPHOCYTES # BLD AUTO: 1.22 10*3/MM3 (ref 0.7–3.1)
LYMPHOCYTES NFR BLD AUTO: 25.3 % (ref 19.6–45.3)
MCH RBC QN AUTO: 30 PG (ref 26.6–33)
MCHC RBC AUTO-ENTMCNC: 32.5 G/DL (ref 31.5–35.7)
MCV RBC AUTO: 92.2 FL (ref 79–97)
MONOCYTES # BLD AUTO: 0.55 10*3/MM3 (ref 0.1–0.9)
MONOCYTES NFR BLD AUTO: 11.4 % (ref 5–12)
NEUTROPHILS NFR BLD AUTO: 2.84 10*3/MM3 (ref 1.7–7)
NEUTROPHILS NFR BLD AUTO: 58.8 % (ref 42.7–76)
PLATELET # BLD AUTO: 174 10*3/MM3 (ref 140–450)
PMV BLD AUTO: 10.1 FL (ref 6–12)
POTASSIUM SERPL-SCNC: 4.1 MMOL/L (ref 3.4–5)
PROT SERPL-MCNC: 7 G/DL (ref 6.3–8.6)
PSA SERPL-MCNC: 4.84 NG/ML (ref 0–4)
RBC # BLD AUTO: 4.5 10*6/MM3 (ref 4.14–5.8)
SODIUM SERPL-SCNC: 142 MMOL/L (ref 137–145)
WBC NRBC COR # BLD: 4.83 10*3/MM3 (ref 3.4–10.8)

## 2022-07-18 PROCEDURE — 36415 COLL VENOUS BLD VENIPUNCTURE: CPT | Performed by: INTERNAL MEDICINE

## 2022-07-18 PROCEDURE — 84153 ASSAY OF PSA TOTAL: CPT | Performed by: UROLOGY

## 2022-07-18 PROCEDURE — 85025 COMPLETE CBC W/AUTO DIFF WBC: CPT | Performed by: INTERNAL MEDICINE

## 2022-07-18 PROCEDURE — 80053 COMPREHEN METABOLIC PANEL: CPT | Performed by: INTERNAL MEDICINE

## 2022-08-02 ENCOUNTER — TELEPHONE (OUTPATIENT)
Dept: FAMILY MEDICINE CLINIC | Facility: CLINIC | Age: 62
End: 2022-08-02

## 2022-08-02 NOTE — TELEPHONE ENCOUNTER
Please inform Rashaun that I have sent a prescription for Paxlovid to the pharmacy.  It may cause his blood pressure to be lower than usual for the next 5 days.  Monitor blood pressure twice daily and if blood pressure is low, hold the hydralazine.

## 2022-08-04 ENCOUNTER — TRANSCRIBE ORDERS (OUTPATIENT)
Dept: CT IMAGING | Facility: CLINIC | Age: 62
End: 2022-08-04

## 2022-08-04 DIAGNOSIS — C64.1 RENAL CELL CARCINOMA OF RIGHT KIDNEY: Primary | ICD-10-CM

## 2022-08-08 RX ORDER — VALSARTAN 320 MG/1
TABLET ORAL
Qty: 90 TABLET | Refills: 3 | Status: SHIPPED | OUTPATIENT
Start: 2022-08-08 | End: 2023-03-22 | Stop reason: SDUPTHER

## 2022-11-18 ENCOUNTER — LAB (OUTPATIENT)
Dept: LAB | Facility: OTHER | Age: 62
End: 2022-11-18

## 2022-11-18 DIAGNOSIS — E78.2 MIXED HYPERLIPIDEMIA: Chronic | ICD-10-CM

## 2022-11-18 DIAGNOSIS — I49.3 VENTRICULAR PREMATURE DEPOLARIZATION: Chronic | ICD-10-CM

## 2022-11-18 DIAGNOSIS — C64.1 RENAL CELL CARCINOMA OF RIGHT KIDNEY: Chronic | ICD-10-CM

## 2022-11-18 DIAGNOSIS — R73.01 IMPAIRED FASTING GLUCOSE: Chronic | ICD-10-CM

## 2022-11-18 DIAGNOSIS — C64.1 RENAL CELL CARCINOMA OF RIGHT KIDNEY: ICD-10-CM

## 2022-11-18 DIAGNOSIS — I10 ESSENTIAL HYPERTENSION: Chronic | ICD-10-CM

## 2022-11-18 DIAGNOSIS — N18.31 STAGE 3A CHRONIC KIDNEY DISEASE: Chronic | ICD-10-CM

## 2022-11-18 DIAGNOSIS — K21.9 GASTROESOPHAGEAL REFLUX DISEASE WITHOUT ESOPHAGITIS: Chronic | ICD-10-CM

## 2022-11-18 DIAGNOSIS — D51.3 OTHER DIETARY VITAMIN B12 DEFICIENCY ANEMIA: Chronic | ICD-10-CM

## 2022-11-18 DIAGNOSIS — R97.20 ELEVATED PSA, LESS THAN 10 NG/ML: Chronic | ICD-10-CM

## 2022-11-18 DIAGNOSIS — E78.1 HYPERTRIGLYCERIDEMIA: Chronic | ICD-10-CM

## 2022-11-18 DIAGNOSIS — E66.3 OVERWEIGHT (BMI 25.0-29.9): Chronic | ICD-10-CM

## 2022-11-18 LAB
ALBUMIN SERPL-MCNC: 4.5 G/DL (ref 3.5–5)
ALBUMIN/GLOB SERPL: 1.6 G/DL (ref 1.1–1.8)
ALP SERPL-CCNC: 72 U/L (ref 38–126)
ALT SERPL W P-5'-P-CCNC: 24 U/L
ANION GAP SERPL CALCULATED.3IONS-SCNC: 9 MMOL/L (ref 5–15)
AST SERPL-CCNC: 21 U/L (ref 17–59)
BASOPHILS # BLD AUTO: 0.04 10*3/MM3 (ref 0–0.2)
BASOPHILS NFR BLD AUTO: 0.7 % (ref 0–1.5)
BILIRUB SERPL-MCNC: 0.8 MG/DL (ref 0.2–1.3)
BUN SERPL-MCNC: 20 MG/DL (ref 7–23)
BUN/CREAT SERPL: 12 (ref 7–25)
CALCIUM SPEC-SCNC: 9.6 MG/DL (ref 8.4–10.2)
CHLORIDE SERPL-SCNC: 105 MMOL/L (ref 101–112)
CHOLEST SERPL-MCNC: 127 MG/DL (ref 150–200)
CO2 SERPL-SCNC: 28 MMOL/L (ref 22–30)
CREAT SERPL-MCNC: 1.66 MG/DL (ref 0.7–1.3)
DEPRECATED RDW RBC AUTO: 45.2 FL (ref 37–54)
EGFRCR SERPLBLD CKD-EPI 2021: 45.7 ML/MIN/1.73
EOSINOPHIL # BLD AUTO: 0.2 10*3/MM3 (ref 0–0.4)
EOSINOPHIL NFR BLD AUTO: 3.6 % (ref 0.3–6.2)
ERYTHROCYTE [DISTWIDTH] IN BLOOD BY AUTOMATED COUNT: 13.7 % (ref 12.3–15.4)
GLOBULIN UR ELPH-MCNC: 2.8 GM/DL (ref 2.3–3.5)
GLUCOSE SERPL-MCNC: 115 MG/DL (ref 70–99)
HBA1C MFR BLD: 5.2 % (ref 4.8–5.6)
HCT VFR BLD AUTO: 42.8 % (ref 37.5–51)
HDLC SERPL-MCNC: 32 MG/DL (ref 40–59)
HGB BLD-MCNC: 13.8 G/DL (ref 13–17.7)
LDLC SERPL CALC-MCNC: 78 MG/DL
LDLC/HDLC SERPL: 2.43 {RATIO} (ref 0–3.55)
LYMPHOCYTES # BLD AUTO: 1.09 10*3/MM3 (ref 0.7–3.1)
LYMPHOCYTES NFR BLD AUTO: 19.7 % (ref 19.6–45.3)
MCH RBC QN AUTO: 30.3 PG (ref 26.6–33)
MCHC RBC AUTO-ENTMCNC: 32.2 G/DL (ref 31.5–35.7)
MCV RBC AUTO: 93.9 FL (ref 79–97)
MONOCYTES # BLD AUTO: 0.56 10*3/MM3 (ref 0.1–0.9)
MONOCYTES NFR BLD AUTO: 10.1 % (ref 5–12)
NEUTROPHILS NFR BLD AUTO: 3.64 10*3/MM3 (ref 1.7–7)
NEUTROPHILS NFR BLD AUTO: 65.9 % (ref 42.7–76)
PLATELET # BLD AUTO: 185 10*3/MM3 (ref 140–450)
PMV BLD AUTO: 10.5 FL (ref 6–12)
POTASSIUM SERPL-SCNC: 4.4 MMOL/L (ref 3.4–5)
PROT SERPL-MCNC: 7.3 G/DL (ref 6.3–8.6)
PSA SERPL-MCNC: 5.27 NG/ML (ref 0–4)
RBC # BLD AUTO: 4.56 10*6/MM3 (ref 4.14–5.8)
SODIUM SERPL-SCNC: 142 MMOL/L (ref 137–145)
TRIGL SERPL-MCNC: 86 MG/DL
TSH SERPL DL<=0.05 MIU/L-ACNC: 1.55 UIU/ML (ref 0.27–4.2)
VIT B12 BLD-MCNC: 1144 PG/ML (ref 211–946)
VLDLC SERPL-MCNC: 17 MG/DL (ref 5–40)
WBC NRBC COR # BLD: 5.53 10*3/MM3 (ref 3.4–10.8)

## 2022-11-18 PROCEDURE — 83036 HEMOGLOBIN GLYCOSYLATED A1C: CPT | Performed by: INTERNAL MEDICINE

## 2022-11-18 PROCEDURE — 84153 ASSAY OF PSA TOTAL: CPT | Performed by: INTERNAL MEDICINE

## 2022-11-18 PROCEDURE — 36415 COLL VENOUS BLD VENIPUNCTURE: CPT | Performed by: INTERNAL MEDICINE

## 2022-11-18 PROCEDURE — 82607 VITAMIN B-12: CPT | Performed by: INTERNAL MEDICINE

## 2022-11-18 PROCEDURE — 80050 GENERAL HEALTH PANEL: CPT | Performed by: INTERNAL MEDICINE

## 2022-11-18 PROCEDURE — 80061 LIPID PANEL: CPT | Performed by: INTERNAL MEDICINE

## 2022-11-29 ENCOUNTER — OFFICE VISIT (OUTPATIENT)
Dept: FAMILY MEDICINE CLINIC | Facility: CLINIC | Age: 62
End: 2022-11-29

## 2022-11-29 ENCOUNTER — TRANSCRIBE ORDERS (OUTPATIENT)
Dept: CT IMAGING | Facility: CLINIC | Age: 62
End: 2022-11-29

## 2022-11-29 VITALS
OXYGEN SATURATION: 95 % | WEIGHT: 229 LBS | HEIGHT: 73 IN | DIASTOLIC BLOOD PRESSURE: 80 MMHG | SYSTOLIC BLOOD PRESSURE: 122 MMHG | TEMPERATURE: 98 F | BODY MASS INDEX: 30.35 KG/M2 | HEART RATE: 70 BPM

## 2022-11-29 DIAGNOSIS — R97.20 ELEVATED PSA, LESS THAN 10 NG/ML: Chronic | ICD-10-CM

## 2022-11-29 DIAGNOSIS — K21.9 GASTROESOPHAGEAL REFLUX DISEASE WITHOUT ESOPHAGITIS: Chronic | ICD-10-CM

## 2022-11-29 DIAGNOSIS — R97.20 ELEVATED PSA: ICD-10-CM

## 2022-11-29 DIAGNOSIS — N18.31 STAGE 3A CHRONIC KIDNEY DISEASE: Chronic | ICD-10-CM

## 2022-11-29 DIAGNOSIS — Z23 NEED FOR COVID-19 VACCINE: ICD-10-CM

## 2022-11-29 DIAGNOSIS — I10 ESSENTIAL HYPERTENSION: Primary | Chronic | ICD-10-CM

## 2022-11-29 DIAGNOSIS — N40.1 BENIGN NON-NODULAR PROSTATIC HYPERPLASIA WITH LOWER URINARY TRACT SYMPTOMS: Primary | ICD-10-CM

## 2022-11-29 DIAGNOSIS — E78.2 MIXED HYPERLIPIDEMIA: Chronic | ICD-10-CM

## 2022-11-29 DIAGNOSIS — E66.3 OVERWEIGHT (BMI 25.0-29.9): Chronic | ICD-10-CM

## 2022-11-29 DIAGNOSIS — R73.01 IMPAIRED FASTING GLUCOSE: Chronic | ICD-10-CM

## 2022-11-29 DIAGNOSIS — D51.3 OTHER DIETARY VITAMIN B12 DEFICIENCY ANEMIA: Chronic | ICD-10-CM

## 2022-11-29 DIAGNOSIS — C64.1 RENAL CELL CARCINOMA OF RIGHT KIDNEY: ICD-10-CM

## 2022-11-29 PROCEDURE — 91312 COVID-19 (PFIZER) BIVALENT BOOSTER 12+YRS: CPT | Performed by: INTERNAL MEDICINE

## 2022-11-29 PROCEDURE — 0124A COVID-19 (PFIZER) BIVALENT BOOSTER 12+YRS: CPT | Performed by: INTERNAL MEDICINE

## 2022-11-29 PROCEDURE — 99214 OFFICE O/P EST MOD 30 MIN: CPT | Performed by: INTERNAL MEDICINE

## 2022-11-29 RX ORDER — TAMSULOSIN HYDROCHLORIDE 0.4 MG/1
1 CAPSULE ORAL
Qty: 90 CAPSULE | Refills: 3 | Status: SHIPPED | OUTPATIENT
Start: 2022-11-29

## 2022-11-29 NOTE — PATIENT INSTRUCTIONS
Exercising to Lose Weight  Getting regular exercise is important for everyone. It is especially important if you are overweight. Being overweight increases your risk of heart disease, stroke, diabetes, high blood pressure, and several types of cancer. Exercising, and reducing the calories you consume, can help you lose weight and improve fitness and health.  Exercise can be moderate or vigorous intensity. To lose weight, most people need to do a certain amount of moderate or vigorous-intensity exercise each week.  How can exercise affect me?  You lose weight when you exercise enough to burn more calories than you eat. Exercise also reduces body fat and builds muscle. The more muscle you have, the more calories you burn. Exercise also:  Improves mood.  Reduces stress and tension.  Improves your overall fitness, flexibility, and endurance.  Increases bone strength.  Moderate-intensity exercise  Moderate-intensity exercise is any activity that gets you moving enough to burn at least three times more energy (calories) than if you were sitting.  Examples of moderate exercise include:  Walking a mile in 15 minutes.  Doing light yard work.  Biking at an easy pace.  Most people should get at least 150 minutes of moderate-intensity exercise a week to maintain their body weight.  Vigorous-intensity exercise  Vigorous-intensity exercise is any activity that gets you moving enough to burn at least six times more calories than if you were sitting. When you exercise at this intensity, you should be working hard enough that you are not able to carry on a conversation.  Examples of vigorous exercise include:  Running.  Playing a team sport, such as football, basketball, and soccer.  Jumping rope.  Most people should get at least 75 minutes a week of vigorous exercise to maintain their body weight.  What actions can I take to lose weight?  The amount of exercise you need to lose weight depends on:  Your age.  The type of  exercise.  Any health conditions you have.  Your overall physical ability.  Talk to your health care provider about how much exercise you need and what types of activities are safe for you.  Nutrition    Make changes to your diet as told by your health care provider or diet and nutrition specialist (dietitian). This may include:  Eating fewer calories.  Eating more protein.  Eating less unhealthy fats.  Eating a diet that includes fresh fruits and vegetables, whole grains, low-fat dairy products, and lean protein.  Avoiding foods with added fat, salt, and sugar.  Drink plenty of water while you exercise to prevent dehydration or heat stroke.  Activity  Choose an activity that you enjoy and set realistic goals. Your health care provider can help you make an exercise plan that works for you.  Exercise at a moderate or vigorous intensity most days of the week.  The intensity of exercise may vary from person to person. You can tell how intense a workout is for you by paying attention to your breathing and heartbeat. Most people will notice their breathing and heartbeat get faster with more intense exercise.  Do resistance training twice each week, such as:  Push-ups.  Sit-ups.  Lifting weights.  Using resistance bands.  Getting short amounts of exercise can be just as helpful as long, structured periods of exercise. If you have trouble finding time to exercise, try doing these things as part of your daily routine:  Get up, stretch, and walk around every 30 minutes throughout the day.  Go for a walk during your lunch break.  Park your car farther away from your destination.  If you take public transportation, get off one stop early and walk the rest of the way.  Make phone calls while standing up and walking around.  Take the stairs instead of elevators or escalators.  Wear comfortable clothes and shoes with good support.  Do not exercise so much that you hurt yourself, feel dizzy, or get very short of breath.  Where to  find more information  [place] Department of Health and Human Services: www.hhs.gov  Centers for Disease Control and Prevention: www.cdc.gov  Contact a health care provider:  Before starting a new exercise program.  If you have questions or concerns about your weight.  If you have a medical problem that keeps you from exercising.  Get help right away if:  You have any of the following while exercising:  Injury.  Dizziness.  Difficulty breathing or shortness of breath that does not go away when you stop exercising.  Chest pain.  Rapid heartbeat.  These symptoms may represent a serious problem that is an emergency. Do not wait to see if the symptoms will go away. Get medical help right away. Call your local emergency services (911 in the [place] Do not drive yourself to the hospital.  Summary  Getting regular exercise is especially important if you are overweight.  Being overweight increases your risk of heart disease, stroke, diabetes, high blood pressure, and several types of cancer.  Losing weight happens when you burn more calories than you eat.  Reducing the amount of calories you eat, and getting regular moderate or vigorous exercise each week, helps you lose weight.  This information is not intended to replace advice given to you by your health care provider. Make sure you discuss any questions you have with your health care provider.  Document Revised: 02/13/2022 Document Reviewed: 02/13/2022  [employer]vier Patient Education © 2022 [employer]  Calorie Counting for Weight Loss  Calories are units of energy. Your body needs a certain number of calories from food to keep going throughout the day. When you eat or drink more calories than your body needs, your body stores the extra calories mostly as fat. When you eat or drink fewer calories than your body needs, your body burns fat to get the energy it needs.  Calorie counting means keeping track of how many calories you eat and drink each day. Calorie counting can be  helpful if you need to lose weight. If you eat fewer calories than your body needs, you should lose weight. Ask your health care provider what a healthy weight is for you.  For calorie counting to work, you will need to eat the right number of calories each day to lose a healthy amount of weight per week. A dietitian can help you figure out how many calories you need in a day and will suggest ways to reach your calorie goal.  A healthy amount of weight to lose each week is usually 1-2 lb (0.5-0.9 kg). This usually means that your daily calorie intake should be reduced by 500-750 calories.  Eating 1,200-1,500 calories a day can help most women lose weight.  Eating 1,500-1,800 calories a day can help most men lose weight.  What do I need to know about calorie counting?  Work with your health care provider or dietitian to determine how many calories you should get each day. To meet your daily calorie goal, you will need to:  Find out how many calories are in each food that you would like to eat. Try to do this before you eat.  Decide how much of the food you plan to eat.  Keep a food log. Do this by writing down what you ate and how many calories it had.  To successfully lose weight, it is important to balance calorie counting with a healthy lifestyle that includes regular activity.  Where do I find calorie information?  The number of calories in a food can be found on a Nutrition Facts label. If a food does not have a Nutrition Facts label, try to look up the calories online or ask your dietitian for help.  Remember that calories are listed per serving. If you choose to have more than one serving of a food, you will have to multiply the calories per serving by the number of servings you plan to eat. For example, the label on a package of bread might say that a serving size is 1 slice and that there are 90 calories in a serving. If you eat 1 slice, you will have eaten 90 calories. If you eat 2 slices, you will have  eaten 180 calories.  How do I keep a food log?  After each time that you eat, record the following in your food log as soon as possible:  What you ate. Be sure to include toppings, sauces, and other extras on the food.  How much you ate. This can be measured in cups, ounces, or number of items.  How many calories were in each food and drink.  The total number of calories in the food you ate.  Keep your food log near you, such as in a pocket-sized notebook or on an marcus or website on your mobile phone. Some programs will calculate calories for you and show you how many calories you have left to meet your daily goal.  What are some portion-control tips?  Know how many calories are in a serving. This will help you know how many servings you can have of a certain food.  Use a measuring cup to measure serving sizes. You could also try weighing out portions on a kitchen scale. With time, you will be able to estimate serving sizes for some foods.  Take time to put servings of different foods on your favorite plates or in your favorite bowls and cups so you know what a serving looks like.  Try not to eat straight from a food's packaging, such as from a bag or box. Eating straight from the package makes it hard to see how much you are eating and can lead to overeating. Put the amount you would like to eat in a cup or on a plate to make sure you are eating the right portion.  Use smaller plates, glasses, and bowls for smaller portions and to prevent overeating.  Try not to multitask. For example, avoid watching TV or using your computer while eating. If it is time to eat, sit down at a table and enjoy your food. This will help you recognize when you are full. It will also help you be more mindful of what and how much you are eating.  What are tips for following this plan?  Reading food labels  Check the calorie count compared with the serving size. The serving size may be smaller than what you are used to eating.  Check the  source of the calories. Try to choose foods that are high in protein, fiber, and vitamins, and low in saturated fat, trans fat, and sodium.  Shopping  Read nutrition labels while you shop. This will help you make healthy decisions about which foods to buy.  Pay attention to nutrition labels for low-fat or fat-free foods. These foods sometimes have the same number of calories or more calories than the full-fat versions. They also often have added sugar, starch, or salt to make up for flavor that was removed with the fat.  Make a grocery list of lower-calorie foods and stick to it.  Cooking  Try to cook your favorite foods in a healthier way. For example, try baking instead of frying.  Use low-fat dairy products.  Meal planning  Use more fruits and vegetables. One-half of your plate should be fruits and vegetables.  Include lean proteins, such as chicken, turkey, and fish.  Lifestyle  Each week, aim to do one of the followin minutes of moderate exercise, such as walking.  75 minutes of vigorous exercise, such as running.  General information  Know how many calories are in the foods you eat most often. This will help you calculate calorie counts faster.  Find a way of tracking calories that works for you. Get creative. Try different apps or programs if writing down calories does not work for you.  What foods should I eat?    Eat nutritious foods. It is better to have a nutritious, high-calorie food, such as an avocado, than a food with few nutrients, such as a bag of potato chips.  Use your calories on foods and drinks that will fill you up and will not leave you hungry soon after eating.  Examples of foods that fill you up are nuts and nut butters, vegetables, lean proteins, and high-fiber foods such as whole grains. High-fiber foods are foods with more than 5 g of fiber per serving.  Pay attention to calories in drinks. Low-calorie drinks include water and unsweetened drinks.  The items listed above may not be  a complete list of foods and beverages you can eat. Contact a dietitian for more information.  What foods should I limit?  Limit foods or drinks that are not good sources of vitamins, minerals, or protein or that are high in unhealthy fats. These include:  Candy.  Other sweets.  Sodas, specialty coffee drinks, alcohol, and juice.  The items listed above may not be a complete list of foods and beverages you should avoid. Contact a dietitian for more information.  How do I count calories when eating out?  Pay attention to portions. Often, portions are much larger when eating out. Try these tips to keep portions smaller:  Consider sharing a meal instead of getting your own.  If you get your own meal, eat only half of it. Before you start eating, ask for a container and put half of your meal into it.  When available, consider ordering smaller portions from the menu instead of full portions.  Pay attention to your food and drink choices. Knowing the way food is cooked and what is included with the meal can help you eat fewer calories.  If calories are listed on the menu, choose the lower-calorie options.  Choose dishes that include vegetables, fruits, whole grains, low-fat dairy products, and lean proteins.  Choose items that are boiled, broiled, grilled, or steamed. Avoid items that are buttered, battered, fried, or served with cream sauce. Items labeled as crispy are usually fried, unless stated otherwise.  Choose water, low-fat milk, unsweetened iced tea, or other drinks without added sugar. If you want an alcoholic beverage, choose a lower-calorie option, such as a glass of wine or light beer.  Ask for dressings, sauces, and syrups on the side. These are usually high in calories, so you should limit the amount you eat.  If you want a salad, choose a garden salad and ask for grilled meats. Avoid extra toppings such as coon, cheese, or fried items. Ask for the dressing on the side, or ask for olive oil and vinegar or  lemon to use as dressing.  Estimate how many servings of a food you are given. Knowing serving sizes will help you be aware of how much food you are eating at restaurants.  Where to find more information  Centers for Disease Control and Prevention: www.cdc.gov  [place] Department of Agriculture: myplate.gov  Summary  Calorie counting means keeping track of how many calories you eat and drink each day. If you eat fewer calories than your body needs, you should lose weight.  A healthy amount of weight to lose per week is usually 1-2 lb (0.5-0.9 kg). This usually means reducing your daily calorie intake by 500-750 calories.  The number of calories in a food can be found on a Nutrition Facts label. If a food does not have a Nutrition Facts label, try to look up the calories online or ask your dietitian for help.  Use smaller plates, glasses, and bowls for smaller portions and to prevent overeating.  Use your calories on foods and drinks that will fill you up and not leave you hungry shortly after a meal.  This information is not intended to replace advice given to you by your health care provider. Make sure you discuss any questions you have with your health care provider.  Document Revised: 01/28/2021 Document Reviewed: 01/28/2021  Elsevier Patient Education © 2022 [employer]

## 2022-11-29 NOTE — PROGRESS NOTES
Chief Complaint  Hypertension, Hyperlipidemia, Impaired fasting glucose, GERD (gastroesophageal reflux disease), Stage 3a chronic kidney disease , Benign non-nodular prostatic hyperplasia with lower urinary, Renal cell carcinoma of right kidney , Other dietary vitamin B12 deficiency anemia, Anemia due to stage 3b chronic kidney disease , and COVID Pfizer Booster  (Second Booster, last booster 3/04/2022)    Subjective        History of Present Illness     Rashaun Quintanilla is our 62-year-old patient who presents for follow up on multiple complex chronic medical issues including hypertension, stage III chronic kidney disease due to right nephrectomy, impaired fasting glucose, essential hypertension, hyperlipidemia, mildly elevated PSA, and GERD among other issues.  He was diagnosed with right renal cell carcinoma 06/2021, for which he underwent right radical nephrectomy and massive IVC thrombectomy at [place] to address large right renal mass and massive inferior vena cava thrombosis, successfully addressed with surgical nephrectomy and thrombectomy.      His postoperative period was complicated by atrial fibrillation, for which he continues on aspirin and Toprol-XL.  BP diary he brings in today reveals excellent BP management, although, he reports systolic above goal at times, which he failed to record.  BP tends to run higher in the morning prior to BP medications. He has been taking 1 1/2 clonidine daily, although, halving the clonidine is difficult since the tablet is so small.  I told him he could try taking a whole tablet b.i.d., but may experience dry mouth or constipation with the higher dose.             Renal function declined with creatinine 1.6 increased from 1.4.  He continues to follow with Ruba Bacon, urology every 6 months and was actually seen yesterday. See note to review visit.  PSA continues to gradually trend up, which she is following/addressing.  MRI prostate with and without  "contrast done locally looked normal and his Ariane biopsy taken 03/23/2022 was negative.     Patient tested positive for COVID early August.  Updated bivalent COVID vaccine is available and given today in the office today.   He had flu vaccine at [facility].  He will notify us of the date of his flu vaccine so we can update record.  .      Weight is up 10 pounds in the past six months with patient now in obesity range.  He admits to noncompliance with diet and continues to eat a lot of higher calorie restaurant meals.  With his history of right nephrectomy, I emphasized importance of getting weight to goal, which would require 20-25 gradual weight loss.        The patient's relevant past medical, surgical, and social history was reviewed in Epic.   Lab results are reviewed with the patient today. CBC unremarkable.  A1c acceptable at 5.2 with dietary efforts.  Normal thyroid screen.  Cholesterol at goal without statin therapy.  B-12 excellent and slightly above goal with daily oral B-12.         Objective   Vital Signs:  /80 (BP Location: Left arm, Patient Position: Sitting, Cuff Size: Large Adult)   Pulse 70 Comment: regular  Temp 98 °F (36.7 °C) (Tympanic)   Ht 185.4 cm (73\")   Wt 104 kg (229 lb)   SpO2 95%   BMI 30.21 kg/m²   Estimated body mass index is 30.21 kg/m² as calculated from the following:    Height as of this encounter: 185.4 cm (73\").    Weight as of this encounter: 104 kg (229 lb).          Physical Exam  Vitals reviewed.   Constitutional:       General: He is not in acute distress.     Appearance: He is well-developed.      Comments: Pleasant male, obese.    HENT:      Head: Normocephalic and atraumatic.      Nose:      Right Sinus: No maxillary sinus tenderness or frontal sinus tenderness.      Left Sinus: No maxillary sinus tenderness or frontal sinus tenderness.      Mouth/Throat:      Mouth: No oral lesions.      Pharynx: Uvula midline.      Tonsils: No tonsillar exudate.   Eyes:      " Conjunctiva/sclera: Conjunctivae normal.      Pupils: Pupils are equal, round, and reactive to light.   Neck:      Thyroid: No thyroid mass or thyromegaly.      Vascular: No carotid bruit or JVD.      Trachea: Trachea normal. No tracheal deviation.   Cardiovascular:      Rate and Rhythm: Normal rate and regular rhythm.  No extrasystoles are present.     Chest Wall: PMI is not displaced.      Heart sounds: Normal heart sounds. No murmur heard.  Pulmonary:      Effort: Pulmonary effort is normal. No accessory muscle usage or respiratory distress.      Breath sounds: Normal breath sounds. No decreased breath sounds, wheezing, rhonchi or rales.   Abdominal:      General: Bowel sounds are normal. There is no distension.      Palpations: Abdomen is soft.      Tenderness: There is no abdominal tenderness.   Musculoskeletal:      Cervical back: Neck supple.   Lymphadenopathy:      Cervical: No cervical adenopathy.   Skin:     General: Skin is warm and dry.      Findings: No rash.      Nails: There is no clubbing.      Comments: Dry skin bilateral lower extremities.    Neurological:      Mental Status: He is alert and oriented to person, place, and time.      Cranial Nerves: No cranial nerve deficit.      Coordination: Coordination normal.   Psychiatric:         Speech: Speech normal.         Behavior: Behavior normal.         Thought Content: Thought content normal.         Judgment: Judgment normal.            Result Review :    CMP    CMP 4/18/22 7/18/22 11/18/22   Glucose 110 (A) 111 (A) 115 (A)   BUN 19 19 20   Creatinine 1.46 (A) 1.47 (A) 1.66 (A)   Sodium 140 142 142   Potassium 4.0 4.1 4.4   Chloride 104 105 105   Calcium 9.8 9.6 9.6   Albumin 4.20 4.30 4.50   Total Bilirubin 0.9 0.8 0.8   Alkaline Phosphatase 62 64 72   AST (SGOT) 23 22 21   ALT (SGPT) 20 21 24   (A) Abnormal value            CBC w/diff    CBC w/Diff 4/18/22 7/18/22 11/18/22   WBC 5.49 4.83 5.53   RBC 4.44 4.50 4.56   Hemoglobin 13.7 13.5 13.8    Hematocrit 40.9 41.5 42.8   MCV 92.1 92.2 93.9   MCH 30.9 30.0 30.3   MCHC 33.5 32.5 32.2   RDW 13.6 13.5 13.7   Platelets 206 174 185   Neutrophil Rel % 55.7 58.8 65.9   Lymphocyte Rel % 27.7 25.3 19.7   Monocyte Rel % 11.1 11.4 10.1   Eosinophil Rel % 4.4 3.9 3.6   Basophil Rel % 1.1 0.6 0.7           Lipid Panel    Lipid Panel 12/29/21 4/26/22 11/18/22   Total Cholesterol 126 (A)  127 (A)   Triglycerides 103  86   HDL Cholesterol 35 (A)  32 (A)   VLDL Cholesterol 19  17   LDL Cholesterol  72 79 78   LDL/HDL Ratio 2.01  2.43   (A) Abnormal value            TSH    TSH 12/29/21 11/18/22   TSH 2.010 1.550           A1C Last 3 Results    HGBA1C Last 3 Results 12/29/21 11/18/22   Hemoglobin A1C 5.07 5.20           PSA    PSA 4/26/22 7/18/22 11/18/22   PSA 4.860 (A) 4.840 (A) 5.270 (A)   (A) Abnormal value            Data reviewed: Consultant notes Ruba Bacon, urology and BP diary.           Assessment and Plan   Diagnoses and all orders for this visit:    1. Essential hypertension (Primary)  -     Ambulatory Referral to Nephrology  -     CBC Auto Differential; Future  -     Comprehensive Metabolic Panel; Future    2. Impaired fasting glucose  -     Hemoglobin A1c; Future    3. Mixed hyperlipidemia  -     LDL Cholesterol, Direct; Future    4. Overweight (BMI 25.0-29.9)  -     Comprehensive Metabolic Panel; Future    5. Gastroesophageal reflux disease without esophagitis  -     CBC Auto Differential; Future    6. Stage 3a chronic kidney disease (HCC) - S/P R nephrectomy  -     Ambulatory Referral to Nephrology  -     Comprehensive Metabolic Panel; Future    7. Elevated PSA, less than 10 ng/ml   -     PSA DIAGNOSTIC; Future    8. Other dietary vitamin B12 deficiency anemia  -     Vitamin B12; Future    9. Need for COVID-19 vaccine    Other orders  -     COVID-19 Bivalent Booster (Pfizer) 12+yrs                Continue to follow with Ruba Bacon, urologist to follow history of right renal cell carcinoma.   Renal function has worsened.  Recommended 15-20 pound weight loss goal over next year with a longer term goal of getting weight to 200 pounds.  This will also help delay progression of IFG and manage cholesterol.        B-12 a little higher than necessary, for which I recommended he decrease oral B-12 to take 6/7 days weekly.      After informed verbal consent, patient is given updated COVID vaccine.  He tolerated well.  He will notify us with the date of his flu vaccine so we can update his immunization record.     Continue to pursue a diabetic style diet and weight loss plan to delay progression of IFG, which is stable.  I encouraged daily exercise/increased physical activity.    His chronic kidney disease is progressed with this set of labs.  Continue to avoid NSAIDs or other nephrotoxic drugs.  Continue to hydrate well.  Refer to Dr Thornton, nephrologist, to be seen in [place] in this patient with history of right radical nephrectomy and massive IVC thrombectomy at [place] to address large right renal mass and massive inferior vena cava thrombosis, successfully addressed with surgical nephrectomy and thrombectomy.      He is currently taking 1/2 tablets of clonidine twice daily, although, cutting the tablet in half is difficult.  He can try taking clonidine one tablet b.i.d. if he can tolerate dry mouth and constipation side effects.  Continue other multiple blood pressure medications.  Continue monitoring blood pressure and heart rate at home regularly.  Pursue aggressive sodium restriction and weight loss.  He still describes lots of room for improvement in these areas.    Return in 6 months for routine follow up with fasting labs one week prior or sooner if needed.     Scribed for Dr. Hammonds by Arlette [facility].     Follow Up   Return in about 6 months (around 5/29/2023) for Follow up in six months with labs one week prior..  Patient was given instructions and counseling regarding his condition  or for health maintenance advice. Please see specific information pulled into the AVS if appropriate.

## 2022-11-29 NOTE — TELEPHONE ENCOUNTER
----- Message from Dominique Allen sent at 11/29/2022  2:02 PM CST -----  Regarding: Refill on Flomax 0.4 mg mail order pharmacy

## 2022-12-20 ENCOUNTER — TELEPHONE (OUTPATIENT)
Dept: FAMILY MEDICINE CLINIC | Facility: CLINIC | Age: 62
End: 2022-12-20
Payer: COMMERCIAL

## 2022-12-20 NOTE — TELEPHONE ENCOUNTER
Urgent care called and said patient tested positive for covid and is wanting Paxlovid to [facility] in [place].

## 2022-12-30 ENCOUNTER — OFFICE VISIT (OUTPATIENT)
Dept: FAMILY MEDICINE CLINIC | Facility: CLINIC | Age: 62
End: 2022-12-30
Payer: COMMERCIAL

## 2022-12-30 VITALS
HEIGHT: 73 IN | TEMPERATURE: 96.9 F | OXYGEN SATURATION: 99 % | BODY MASS INDEX: 30.48 KG/M2 | HEART RATE: 69 BPM | WEIGHT: 230 LBS | SYSTOLIC BLOOD PRESSURE: 142 MMHG | DIASTOLIC BLOOD PRESSURE: 86 MMHG

## 2022-12-30 DIAGNOSIS — H00.014 HORDEOLUM EXTERNUM OF LEFT UPPER EYELID: Primary | ICD-10-CM

## 2022-12-30 PROCEDURE — 99213 OFFICE O/P EST LOW 20 MIN: CPT | Performed by: NURSE PRACTITIONER

## 2022-12-30 RX ORDER — POLYMYXIN B SULFATE AND TRIMETHOPRIM 1; 10000 MG/ML; [USP'U]/ML
1 SOLUTION OPHTHALMIC EVERY 6 HOURS
Qty: 10 ML | Refills: 0 | Status: SHIPPED | OUTPATIENT
Start: 2022-12-30

## 2022-12-30 NOTE — PROGRESS NOTES
Subjective   Rashaun Quintanilla is a 62 y.o. male who presents to the office complaining of a style on upper LEFT eyelid that he noticed two days ago.  Denies tenderness.  Has been using Emycin ointment given to him by PCP Cassius.    History of Present Illness     The following portions of the patient's history were reviewed and updated as appropriate: allergies, current medications, past family history, past medical history, past social history, past surgical history and problem list.    Review of Systems   Constitutional: Negative for chills, fatigue and fever.   HENT: Negative for congestion, sneezing, sore throat and trouble swallowing.    Eyes: Negative for visual disturbance.   Respiratory: Negative for cough, chest tightness, shortness of breath and wheezing.    Cardiovascular: Negative for chest pain, palpitations and leg swelling.   Gastrointestinal: Negative for abdominal pain, constipation, diarrhea, nausea and vomiting.   Genitourinary: Negative for dysuria, frequency and urgency.   Musculoskeletal: Negative for neck pain.   Skin: Negative for rash.   Neurological: Negative for dizziness, weakness and headaches.   Psychiatric/Behavioral:        In the past two weeks the pt has not felt down, depressed, hopeless or lost interest in doing things   All other systems reviewed and are negative.      Objective   Physical Exam  Vitals and nursing note reviewed.   Constitutional:       Appearance: Normal appearance. He is well-developed.   HENT:      Head: Normocephalic and atraumatic.      Right Ear: External ear normal.      Left Ear: External ear normal.      Nose: Nose normal.   Eyes:      General: Vision grossly intact. No scleral icterus.        Right eye: No discharge.         Left eye: Hordeolum present.No discharge.      Extraocular Movements: Extraocular movements intact.      Conjunctiva/sclera: Conjunctivae normal.      Pupils: Pupils are equal, round, and reactive to light.   Neck:      Thyroid: No  thyromegaly.   Cardiovascular:      Rate and Rhythm: Normal rate and regular rhythm.      Heart sounds: Normal heart sounds. No murmur heard.    No friction rub. No gallop.   Pulmonary:      Effort: Pulmonary effort is normal. No respiratory distress.      Breath sounds: Normal breath sounds. No wheezing or rales.   Abdominal:      General: Bowel sounds are normal. There is no distension.      Palpations: Abdomen is soft.      Tenderness: There is no abdominal tenderness. There is no guarding or rebound.   Musculoskeletal:         General: Normal range of motion.      Cervical back: Normal range of motion and neck supple.   Lymphadenopathy:      Cervical: No cervical adenopathy.   Skin:     General: Skin is warm and dry.      Capillary Refill: Capillary refill takes less than 2 seconds.      Findings: No rash.   Neurological:      General: No focal deficit present.      Mental Status: He is alert and oriented to person, place, and time.      GCS: GCS eye subscore is 4. GCS verbal subscore is 5. GCS motor subscore is 6.      Cranial Nerves: Cranial nerves 2-12 are intact. No cranial nerve deficit.   Psychiatric:         Behavior: Behavior normal. Behavior is cooperative.         Thought Content: Thought content normal.         Judgment: Judgment normal.         Assessment & Plan   Diagnoses and all orders for this visit:    1. Hordeolum externum of left upper eyelid (Primary)    Other orders  -     trimethoprim-polymyxin b (POLYTRIM) 29953-9.1 UNIT/ML-% ophthalmic solution; Administer 1 drop into the left eye Every 6 (Six) Hours.  Dispense: 10 mL; Refill: 0    Encouraged to continue with warm compresses prn.    Do not try to poke or open any kind of redness or swelling at his eyelid.  Good hygiene.  Will switch ophthalmic product, may call my office for any concerns or questions.                 This document has been electronically signed by EDGAR Ross on January 11, 2023 20:41 CST    Answers for HPI/ROS  submitted by the patient on 12/30/2022  What is the primary reason for your visit?: Other  Please describe your symptoms.: bump on left eyelid, possible sty  Have you had these symptoms before?: No  How long have you been having these symptoms?: 1-4 days

## 2023-01-26 ENCOUNTER — OFFICE VISIT (OUTPATIENT)
Dept: FAMILY MEDICINE CLINIC | Facility: CLINIC | Age: 63
End: 2023-01-26
Payer: COMMERCIAL

## 2023-01-26 VITALS
SYSTOLIC BLOOD PRESSURE: 124 MMHG | TEMPERATURE: 98.3 F | DIASTOLIC BLOOD PRESSURE: 84 MMHG | WEIGHT: 230 LBS | HEART RATE: 67 BPM | BODY MASS INDEX: 30.48 KG/M2 | OXYGEN SATURATION: 96 % | HEIGHT: 73 IN

## 2023-01-26 DIAGNOSIS — I10 ESSENTIAL HYPERTENSION: Chronic | ICD-10-CM

## 2023-01-26 DIAGNOSIS — H00.014 HORDEOLUM EXTERNUM OF LEFT UPPER EYELID: Primary | ICD-10-CM

## 2023-01-26 DIAGNOSIS — N18.31 STAGE 3A CHRONIC KIDNEY DISEASE: Chronic | ICD-10-CM

## 2023-01-26 DIAGNOSIS — E66.3 OVERWEIGHT (BMI 25.0-29.9): Chronic | ICD-10-CM

## 2023-01-26 PROCEDURE — 99213 OFFICE O/P EST LOW 20 MIN: CPT | Performed by: INTERNAL MEDICINE

## 2023-01-26 NOTE — PROGRESS NOTES
"Chief Complaint  Stye (Left eye) and Hypertension    Subjective        Rashaun Quintanilla presents to [facility]  History of Present Illness    Rashaun has had a stye on the left upper eyelid for the past few weeks.  His wife has a history of a stye and had a tube of erythromycin ophthalmic ointment, which he tried without much improvement.  He has been to urgent care where they gave him Maxitrol eyedrops, which also did not really change anything.  The stye has gradually improved over the course of the past week.  It is getting smaller.  There is another small stye on the left upper eyelid medial to the 1 that has been symptomatic.  I was able to express occluded material from both of these areas today as well as a similar asymptomatic area on the right lower eyelid.  We reviewed eyelid hygiene with warm washcloth daily to prevent recurrence.    His blood pressure is improved today.  Heart rate is at goal.  He continues current multidrug therapy including clonidine 0.1 mg 1/2 tablet twice daily.  He is tolerating the dry mouth side effect adequately.  He denies uncontrolled constipation issues.  We continue to encourage aggressive weight loss to improve hypertension management with less medication in this patient with class IIIa CKD and unilateral kidney.  Weight is stable with this visit.  BMI is above goal at 30.4.    He has established cardiology care with Dr. Rueda.  He saw Dr. Tipton nurse practitioner recently who has scheduled him for echocardiogram and a stress test.    He will continue to follow with [facility] due to his history of right RCCA, status post right nephrectomy.  He is getting excellent news on follow-ups.  Prognosis is very good.    Objective   Vital Signs:  /84 (BP Location: Left arm, Patient Position: Sitting, Cuff Size: Large Adult)   Pulse 67 Comment: regular  Temp 98.3 °F (36.8 °C) (Temporal)   Ht 185.4 cm (73\")   Wt 104 " "kg (230 lb)   SpO2 96%   BMI 30.34 kg/m²   Estimated body mass index is 30.34 kg/m² as calculated from the following:    Height as of this encounter: 185.4 cm (73\").    Weight as of this encounter: 104 kg (230 lb).             Physical Exam  Vitals and nursing note reviewed.   Constitutional:       Appearance: Normal appearance. He is well-developed.   HENT:      Head: Normocephalic and atraumatic.      Right Ear: External ear normal.      Left Ear: External ear normal.      Nose: Nose normal.   Eyes:      General: Vision grossly intact. No scleral icterus.        Right eye: No discharge.         Left eye: Hordeolum present.No discharge.      Extraocular Movements: Extraocular movements intact.      Conjunctiva/sclera: Conjunctivae normal.      Pupils: Pupils are equal, round, and reactive to light.      Comments: A mature stye is noted on the middle of the left upper eyelid.  I am able to express the contents today without difficulty.  There is another smaller stye medially, and I am able to express a plug from the occluded for without difficulty.  I was also able to remove a plug from, and occluded pore of the right lower eyelid laterally.  Exam of the left sclera and conjunctiva is unremarkable.  There is no significant cellulitis of the left upper eyelid or periorbital area   Neck:      Thyroid: No thyromegaly.   Cardiovascular:      Rate and Rhythm: Normal rate and regular rhythm.      Heart sounds: Normal heart sounds. No murmur heard.    No friction rub. No gallop.   Pulmonary:      Effort: Pulmonary effort is normal. No respiratory distress.      Breath sounds: Normal breath sounds. No wheezing or rales.   Abdominal:      General: There is no distension.      Palpations: Abdomen is soft.      Tenderness: There is no rebound.      Comments: Overweight/obese abdomen   Musculoskeletal:         General: Normal range of motion.      Cervical back: Normal range of motion and neck supple.   Lymphadenopathy:      " Cervical: No cervical adenopathy.   Skin:     General: Skin is warm and dry.      Capillary Refill: Capillary refill takes less than 2 seconds.      Findings: No rash.   Neurological:      General: No focal deficit present.      Mental Status: He is alert and oriented to person, place, and time. Mental status is at baseline.      GCS: GCS eye subscore is 4. GCS verbal subscore is 5. GCS motor subscore is 6.      Cranial Nerves: Cranial nerves 2-12 are intact. No cranial nerve deficit.   Psychiatric:         Mood and Affect: Mood normal.         Behavior: Behavior normal. Behavior is cooperative.         Thought Content: Thought content normal.         Judgment: Judgment normal.        Result Review :    Common labs    Common Labs 4/26/22 4/26/22 7/18/22 7/18/22 7/18/22 11/18/22 11/18/22 11/18/22 11/18/22 11/18/22    0706 [id number] 0707 0707 0710 0710 0710 0710 0710   Glucose    111 (A)   115 (A)      BUN    19   20      Creatinine    1.47 (A)   1.66 (A)      Sodium    142   142      Potassium    4.1   4.4      Chloride    105   105      Calcium    9.6   9.6      Albumin    4.30   4.50      Total Bilirubin    0.8   0.8      Alkaline Phosphatase    64   72      AST (SGOT)    22   21      ALT (SGPT)    21   24      WBC   4.83   5.53       Hemoglobin   13.5   13.8       Hematocrit   41.5   42.8       Platelets   174   185       Total Cholesterol        127 (A)     Triglycerides        86     HDL Cholesterol        32 (A)     LDL Cholesterol  79       78     Hemoglobin A1C         5.20    PSA  4.860 (A)   4.840 (A)     5.270 (A)   (A) Abnormal value                         Assessment and Plan   Diagnoses and all orders for this visit:    1. Hordeolum externum of left upper eyelid (Primary)    2. Essential hypertension    3. Overweight (BMI 25.0-29.9)    4. Stage 3a chronic kidney disease (HCC) - S/P R nephrectomy    We outlined future management of temo, should he experience recurrence.  Warm compresses as primary  management instead of ointments or drops.  We reviewed daily eyelid hygiene to help prevent recurrence of this issue.  He voices understanding.    Continue current aggressive multidrug hypertension management in this patient with unilateral kidney and stage IIIa CKD.  Blood pressure is improved today.  We continue to encourage aggressive weight loss and sodium restriction.    Return to clinic as previously scheduled, sooner if needed.         Follow Up   Return if symptoms worsen or fail to improve, for Next scheduled follow up - labs 1 week prior.  Patient was given instructions and counseling regarding his condition or for health maintenance advice. Please see specific information pulled into the AVS if appropriate.

## 2023-01-26 NOTE — PATIENT INSTRUCTIONS
Calorie Counting for Weight Loss  Calories are units of energy. Your body needs a certain number of calories from food to keep going throughout the day. When you eat or drink more calories than your body needs, your body stores the extra calories mostly as fat. When you eat or drink fewer calories than your body needs, your body burns fat to get the energy it needs.  Calorie counting means keeping track of how many calories you eat and drink each day. Calorie counting can be helpful if you need to lose weight. If you eat fewer calories than your body needs, you should lose weight. Ask your health care provider what a healthy weight is for you.  For calorie counting to work, you will need to eat the right number of calories each day to lose a healthy amount of weight per week. A dietitian can help you figure out how many calories you need in a day and will suggest ways to reach your calorie goal.  A healthy amount of weight to lose each week is usually 1-2 lb (0.5-0.9 kg). This usually means that your daily calorie intake should be reduced by 500-750 calories.  Eating 1,200-1,500 calories a day can help most women lose weight.  Eating 1,500-1,800 calories a day can help most men lose weight.  What do I need to know about calorie counting?  Work with your health care provider or dietitian to determine how many calories you should get each day. To meet your daily calorie goal, you will need to:  Find out how many calories are in each food that you would like to eat. Try to do this before you eat.  Decide how much of the food you plan to eat.  Keep a food log. Do this by writing down what you ate and how many calories it had.  To successfully lose weight, it is important to balance calorie counting with a healthy lifestyle that includes regular activity.  Where do I find calorie information?  The number of calories in a food can be found on a Nutrition Facts label. If a food does not have a Nutrition Facts label, try to  look up the calories online or ask your dietitian for help.  Remember that calories are listed per serving. If you choose to have more than one serving of a food, you will have to multiply the calories per serving by the number of servings you plan to eat. For example, the label on a package of bread might say that a serving size is 1 slice and that there are 90 calories in a serving. If you eat 1 slice, you will have eaten 90 calories. If you eat 2 slices, you will have eaten 180 calories.  How do I keep a food log?  After each time that you eat, record the following in your food log as soon as possible:  What you ate. Be sure to include toppings, sauces, and other extras on the food.  How much you ate. This can be measured in cups, ounces, or number of items.  How many calories were in each food and drink.  The total number of calories in the food you ate.  Keep your food log near you, such as in a pocket-sized notebook or on an marcus or website on your mobile phone. Some programs will calculate calories for you and show you how many calories you have left to meet your daily goal.  What are some portion-control tips?  Know how many calories are in a serving. This will help you know how many servings you can have of a certain food.  Use a measuring cup to measure serving sizes. You could also try weighing out portions on a kitchen scale. With time, you will be able to estimate serving sizes for some foods.  Take time to put servings of different foods on your favorite plates or in your favorite bowls and cups so you know what a serving looks like.  Try not to eat straight from a food's packaging, such as from a bag or box. Eating straight from the package makes it hard to see how much you are eating and can lead to overeating. Put the amount you would like to eat in a cup or on a plate to make sure you are eating the right portion.  Use smaller plates, glasses, and bowls for smaller portions and to prevent  overeating.  Try not to multitask. For example, avoid watching TV or using your computer while eating. If it is time to eat, sit down at a table and enjoy your food. This will help you recognize when you are full. It will also help you be more mindful of what and how much you are eating.  What are tips for following this plan?  Reading food labels  Check the calorie count compared with the serving size. The serving size may be smaller than what you are used to eating.  Check the source of the calories. Try to choose foods that are high in protein, fiber, and vitamins, and low in saturated fat, trans fat, and sodium.  Shopping  Read nutrition labels while you shop. This will help you make healthy decisions about which foods to buy.  Pay attention to nutrition labels for low-fat or fat-free foods. These foods sometimes have the same number of calories or more calories than the full-fat versions. They also often have added sugar, starch, or salt to make up for flavor that was removed with the fat.  Make a grocery list of lower-calorie foods and stick to it.  Cooking  Try to cook your favorite foods in a healthier way. For example, try baking instead of frying.  Use low-fat dairy products.  Meal planning  Use more fruits and vegetables. One-half of your plate should be fruits and vegetables.  Include lean proteins, such as chicken, turkey, and fish.  Lifestyle  Each week, aim to do one of the followin minutes of moderate exercise, such as walking.  75 minutes of vigorous exercise, such as running.  General information  Know how many calories are in the foods you eat most often. This will help you calculate calorie counts faster.  Find a way of tracking calories that works for you. Get creative. Try different apps or programs if writing down calories does not work for you.  What foods should I eat?    Eat nutritious foods. It is better to have a nutritious, high-calorie food, such as an avocado, than a food with  few nutrients, such as a bag of potato chips.  Use your calories on foods and drinks that will fill you up and will not leave you hungry soon after eating.  Examples of foods that fill you up are nuts and nut butters, vegetables, lean proteins, and high-fiber foods such as whole grains. High-fiber foods are foods with more than 5 g of fiber per serving.  Pay attention to calories in drinks. Low-calorie drinks include water and unsweetened drinks.  The items listed above may not be a complete list of foods and beverages you can eat. Contact a dietitian for more information.  What foods should I limit?  Limit foods or drinks that are not good sources of vitamins, minerals, or protein or that are high in unhealthy fats. These include:  Candy.  Other sweets.  Sodas, specialty coffee drinks, alcohol, and juice.  The items listed above may not be a complete list of foods and beverages you should avoid. Contact a dietitian for more information.  How do I count calories when eating out?  Pay attention to portions. Often, portions are much larger when eating out. Try these tips to keep portions smaller:  Consider sharing a meal instead of getting your own.  If you get your own meal, eat only half of it. Before you start eating, ask for a container and put half of your meal into it.  When available, consider ordering smaller portions from the menu instead of full portions.  Pay attention to your food and drink choices. Knowing the way food is cooked and what is included with the meal can help you eat fewer calories.  If calories are listed on the menu, choose the lower-calorie options.  Choose dishes that include vegetables, fruits, whole grains, low-fat dairy products, and lean proteins.  Choose items that are boiled, broiled, grilled, or steamed. Avoid items that are buttered, battered, fried, or served with cream sauce. Items labeled as crispy are usually fried, unless stated otherwise.  Choose water, low-fat milk,  unsweetened iced tea, or other drinks without added sugar. If you want an alcoholic beverage, choose a lower-calorie option, such as a glass of wine or light beer.  Ask for dressings, sauces, and syrups on the side. These are usually high in calories, so you should limit the amount you eat.  If you want a salad, choose a garden salad and ask for grilled meats. Avoid extra toppings such as coon, cheese, or fried items. Ask for the dressing on the side, or ask for olive oil and vinegar or lemon to use as dressing.  Estimate how many servings of a food you are given. Knowing serving sizes will help you be aware of how much food you are eating at restaurants.  Where to find more information  Centers for Disease Control and Prevention: www.cdc.gov  [place] Department of Agriculture: myplate.gov  Summary  Calorie counting means keeping track of how many calories you eat and drink each day. If you eat fewer calories than your body needs, you should lose weight.  A healthy amount of weight to lose per week is usually 1-2 lb (0.5-0.9 kg). This usually means reducing your daily calorie intake by 500-750 calories.  The number of calories in a food can be found on a Nutrition Facts label. If a food does not have a Nutrition Facts label, try to look up the calories online or ask your dietitian for help.  Use smaller plates, glasses, and bowls for smaller portions and to prevent overeating.  Use your calories on foods and drinks that will fill you up and not leave you hungry shortly after a meal.  This information is not intended to replace advice given to you by your health care provider. Make sure you discuss any questions you have with your health care provider.  Document Revised: 01/28/2021 Document Reviewed: 01/28/2021  Elsevier Patient Education © 2022 [employer]

## 2023-02-06 ENCOUNTER — LAB (OUTPATIENT)
Dept: LAB | Facility: OTHER | Age: 63
End: 2023-02-06
Payer: COMMERCIAL

## 2023-02-06 DIAGNOSIS — R97.20 ELEVATED PSA: ICD-10-CM

## 2023-02-06 LAB — PSA SERPL-MCNC: 5.22 NG/ML (ref 0–4)

## 2023-02-06 PROCEDURE — 36415 COLL VENOUS BLD VENIPUNCTURE: CPT | Performed by: INTERNAL MEDICINE

## 2023-02-06 PROCEDURE — 84153 ASSAY OF PSA TOTAL: CPT | Performed by: UROLOGY

## 2023-02-07 RX ORDER — HYDRALAZINE HYDROCHLORIDE 50 MG/1
TABLET, FILM COATED ORAL
Qty: 270 TABLET | Refills: 3 | Status: SHIPPED | OUTPATIENT
Start: 2023-02-07 | End: 2023-03-22 | Stop reason: SDUPTHER

## 2023-02-20 ENCOUNTER — TRANSCRIBE ORDERS (OUTPATIENT)
Dept: LAB | Facility: HOSPITAL | Age: 63
End: 2023-02-20
Payer: COMMERCIAL

## 2023-02-20 ENCOUNTER — APPOINTMENT (OUTPATIENT)
Dept: LAB | Facility: HOSPITAL | Age: 63
End: 2023-02-20
Payer: COMMERCIAL

## 2023-02-20 ENCOUNTER — LAB (OUTPATIENT)
Dept: LAB | Facility: HOSPITAL | Age: 63
End: 2023-02-20
Payer: COMMERCIAL

## 2023-02-20 DIAGNOSIS — N18.30 STAGE 3 CHRONIC KIDNEY DISEASE, UNSPECIFIED WHETHER STAGE 3A OR 3B CKD: Primary | ICD-10-CM

## 2023-02-20 LAB
ALBUMIN SERPL-MCNC: 4.6 G/DL (ref 3.5–5.2)
ANION GAP SERPL CALCULATED.3IONS-SCNC: 9.2 MMOL/L (ref 5–15)
BUN SERPL-MCNC: 18 MG/DL (ref 8–23)
BUN/CREAT SERPL: 11.5 (ref 7–25)
CALCIUM SPEC-SCNC: 9.7 MG/DL (ref 8.6–10.5)
CHLORIDE SERPL-SCNC: 105 MMOL/L (ref 98–107)
CO2 SERPL-SCNC: 26.8 MMOL/L (ref 22–29)
CREAT SERPL-MCNC: 1.57 MG/DL (ref 0.76–1.27)
EGFRCR SERPLBLD CKD-EPI 2021: 48.9 ML/MIN/1.73
GLUCOSE SERPL-MCNC: 100 MG/DL (ref 65–99)
PHOSPHATE SERPL-MCNC: 3.1 MG/DL (ref 2.5–4.5)
POTASSIUM SERPL-SCNC: 4 MMOL/L (ref 3.5–5.2)
SODIUM SERPL-SCNC: 141 MMOL/L (ref 136–145)

## 2023-02-20 PROCEDURE — 82570 ASSAY OF URINE CREATININE: CPT | Performed by: INTERNAL MEDICINE

## 2023-02-20 PROCEDURE — 84156 ASSAY OF PROTEIN URINE: CPT | Performed by: INTERNAL MEDICINE

## 2023-02-20 PROCEDURE — 80069 RENAL FUNCTION PANEL: CPT | Performed by: INTERNAL MEDICINE

## 2023-02-20 PROCEDURE — 85652 RBC SED RATE AUTOMATED: CPT | Performed by: INTERNAL MEDICINE

## 2023-02-20 PROCEDURE — 83970 ASSAY OF PARATHORMONE: CPT | Performed by: INTERNAL MEDICINE

## 2023-02-20 PROCEDURE — 81001 URINALYSIS AUTO W/SCOPE: CPT | Performed by: INTERNAL MEDICINE

## 2023-02-20 PROCEDURE — 36415 COLL VENOUS BLD VENIPUNCTURE: CPT | Performed by: INTERNAL MEDICINE

## 2023-02-21 LAB
BACTERIA UR QL AUTO: NORMAL /HPF
BILIRUB UR QL STRIP: NEGATIVE
CLARITY UR: CLEAR
COLOR UR: YELLOW
CREAT UR-MCNC: 149.1 MG/DL
ERYTHROCYTE [SEDIMENTATION RATE] IN BLOOD: 3 MM/HR (ref 0–20)
GLUCOSE UR STRIP-MCNC: NEGATIVE MG/DL
HGB UR QL STRIP.AUTO: NEGATIVE
HYALINE CASTS UR QL AUTO: NORMAL /LPF
KETONES UR QL STRIP: NEGATIVE
LEUKOCYTE ESTERASE UR QL STRIP.AUTO: NEGATIVE
NITRITE UR QL STRIP: NEGATIVE
PH UR STRIP.AUTO: 6.5 [PH] (ref 5–8)
PROT ?TM UR-MCNC: 13 MG/DL
PROT UR QL STRIP: NEGATIVE
PROT/CREAT UR: 87.2 MG/G CREA (ref 0–200)
PTH-INTACT SERPL-MCNC: 85.4 PG/ML (ref 15–65)
RBC # UR STRIP: NORMAL /HPF
REF LAB TEST METHOD: NORMAL
SP GR UR STRIP: 1.02 (ref 1–1.03)
SQUAMOUS #/AREA URNS HPF: NORMAL /HPF
UROBILINOGEN UR QL STRIP: NORMAL
WBC # UR STRIP: NORMAL /HPF

## 2023-03-07 ENCOUNTER — TRANSCRIBE ORDERS (OUTPATIENT)
Dept: LAB | Facility: HOSPITAL | Age: 63
End: 2023-03-07
Payer: MEDICARE

## 2023-03-07 DIAGNOSIS — N18.30 STAGE 3 CHRONIC KIDNEY DISEASE, UNSPECIFIED WHETHER STAGE 3A OR 3B CKD: Primary | ICD-10-CM

## 2023-03-22 ENCOUNTER — LAB (OUTPATIENT)
Dept: LAB | Facility: OTHER | Age: 63
End: 2023-03-22
Payer: MEDICARE

## 2023-03-22 DIAGNOSIS — C64.1 RENAL CELL CARCINOMA OF RIGHT KIDNEY: ICD-10-CM

## 2023-03-22 LAB
ALBUMIN SERPL-MCNC: 4.4 G/DL (ref 3.5–5)
ALBUMIN/GLOB SERPL: 1.5 G/DL (ref 1.1–1.8)
ALP SERPL-CCNC: 66 U/L (ref 38–126)
ALT SERPL W P-5'-P-CCNC: 26 U/L
ANION GAP SERPL CALCULATED.3IONS-SCNC: 10 MMOL/L (ref 5–15)
AST SERPL-CCNC: 21 U/L (ref 17–59)
BASOPHILS # BLD AUTO: 0.1 10*3/MM3 (ref 0–0.2)
BASOPHILS NFR BLD AUTO: 1.7 % (ref 0–1.5)
BILIRUB SERPL-MCNC: 1 MG/DL (ref 0.2–1.3)
BUN SERPL-MCNC: 22 MG/DL (ref 7–23)
BUN/CREAT SERPL: 15 (ref 7–25)
CALCIUM SPEC-SCNC: 9.6 MG/DL (ref 8.4–10.2)
CHLORIDE SERPL-SCNC: 102 MMOL/L (ref 101–112)
CO2 SERPL-SCNC: 29 MMOL/L (ref 22–30)
CREAT SERPL-MCNC: 1.47 MG/DL (ref 0.7–1.3)
DEPRECATED RDW RBC AUTO: 44.5 FL (ref 37–54)
EGFRCR SERPLBLD CKD-EPI 2021: 52.6 ML/MIN/1.73
EOSINOPHIL # BLD AUTO: 0.21 10*3/MM3 (ref 0–0.4)
EOSINOPHIL NFR BLD AUTO: 3.6 % (ref 0.3–6.2)
ERYTHROCYTE [DISTWIDTH] IN BLOOD BY AUTOMATED COUNT: 13.4 % (ref 12.3–15.4)
GLOBULIN UR ELPH-MCNC: 3 GM/DL (ref 2.3–3.5)
GLUCOSE SERPL-MCNC: 114 MG/DL (ref 70–99)
HCT VFR BLD AUTO: 44.8 % (ref 37.5–51)
HGB BLD-MCNC: 14.6 G/DL (ref 13–17.7)
LYMPHOCYTES # BLD AUTO: 1.43 10*3/MM3 (ref 0.7–3.1)
LYMPHOCYTES NFR BLD AUTO: 24.4 % (ref 19.6–45.3)
MCH RBC QN AUTO: 30.5 PG (ref 26.6–33)
MCHC RBC AUTO-ENTMCNC: 32.6 G/DL (ref 31.5–35.7)
MCV RBC AUTO: 93.7 FL (ref 79–97)
MONOCYTES # BLD AUTO: 0.61 10*3/MM3 (ref 0.1–0.9)
MONOCYTES NFR BLD AUTO: 10.4 % (ref 5–12)
NEUTROPHILS NFR BLD AUTO: 3.51 10*3/MM3 (ref 1.7–7)
NEUTROPHILS NFR BLD AUTO: 59.9 % (ref 42.7–76)
PLATELET # BLD AUTO: 176 10*3/MM3 (ref 140–450)
PMV BLD AUTO: 10.7 FL (ref 6–12)
POTASSIUM SERPL-SCNC: 3.9 MMOL/L (ref 3.4–5)
PROT SERPL-MCNC: 7.4 G/DL (ref 6.3–8.6)
RBC # BLD AUTO: 4.78 10*6/MM3 (ref 4.14–5.8)
SODIUM SERPL-SCNC: 141 MMOL/L (ref 137–145)
WBC NRBC COR # BLD: 5.86 10*3/MM3 (ref 3.4–10.8)

## 2023-03-22 PROCEDURE — 36415 COLL VENOUS BLD VENIPUNCTURE: CPT | Performed by: INTERNAL MEDICINE

## 2023-03-22 PROCEDURE — 85025 COMPLETE CBC W/AUTO DIFF WBC: CPT | Performed by: INTERNAL MEDICINE

## 2023-03-22 PROCEDURE — 80053 COMPREHEN METABOLIC PANEL: CPT | Performed by: INTERNAL MEDICINE

## 2023-03-22 RX ORDER — HYDRALAZINE HYDROCHLORIDE 50 MG/1
50 TABLET, FILM COATED ORAL 3 TIMES DAILY
Qty: 270 TABLET | Refills: 3 | Status: SHIPPED | OUTPATIENT
Start: 2023-03-22

## 2023-03-22 RX ORDER — METOPROLOL SUCCINATE 100 MG/1
100 TABLET, EXTENDED RELEASE ORAL NIGHTLY
Qty: 90 TABLET | Refills: 3 | Status: SHIPPED | OUTPATIENT
Start: 2023-03-22

## 2023-03-22 RX ORDER — NIFEDIPINE 60 MG/1
60 TABLET, EXTENDED RELEASE ORAL DAILY
Qty: 90 TABLET | Refills: 3 | Status: SHIPPED | OUTPATIENT
Start: 2023-03-22

## 2023-03-22 RX ORDER — VALSARTAN 320 MG/1
320 TABLET ORAL DAILY
Qty: 90 TABLET | Refills: 3 | Status: SHIPPED | OUTPATIENT
Start: 2023-03-22

## 2023-03-22 RX ORDER — CLONIDINE HYDROCHLORIDE 0.1 MG/1
TABLET ORAL
Qty: 180 TABLET | Refills: 3 | Status: SHIPPED | OUTPATIENT
Start: 2023-03-22

## 2023-03-31 ENCOUNTER — TRANSCRIBE ORDERS (OUTPATIENT)
Dept: GENERAL RADIOLOGY | Facility: CLINIC | Age: 63
End: 2023-03-31
Payer: MEDICARE

## 2023-03-31 ENCOUNTER — TRANSCRIBE ORDERS (OUTPATIENT)
Dept: LAB | Facility: OTHER | Age: 63
End: 2023-03-31
Payer: MEDICARE

## 2023-03-31 DIAGNOSIS — R97.20 ELEVATED PSA: ICD-10-CM

## 2023-03-31 DIAGNOSIS — N18.30 STAGE 3 CHRONIC KIDNEY DISEASE, UNSPECIFIED WHETHER STAGE 3A OR 3B CKD: Primary | ICD-10-CM

## 2023-03-31 DIAGNOSIS — C64.1 RENAL CELL CARCINOMA OF RIGHT KIDNEY: Primary | ICD-10-CM

## 2023-03-31 DIAGNOSIS — E21.1 SECONDARY HYPERPARATHYROIDISM, NOT ELSEWHERE CLASSIFIED: ICD-10-CM

## 2023-05-26 ENCOUNTER — LAB (OUTPATIENT)
Dept: LAB | Facility: OTHER | Age: 63
End: 2023-05-26

## 2023-05-26 DIAGNOSIS — D51.3 OTHER DIETARY VITAMIN B12 DEFICIENCY ANEMIA: Chronic | ICD-10-CM

## 2023-05-26 DIAGNOSIS — K21.9 GASTROESOPHAGEAL REFLUX DISEASE WITHOUT ESOPHAGITIS: Chronic | ICD-10-CM

## 2023-05-26 DIAGNOSIS — E78.2 MIXED HYPERLIPIDEMIA: Chronic | ICD-10-CM

## 2023-05-26 DIAGNOSIS — I10 ESSENTIAL HYPERTENSION: Chronic | ICD-10-CM

## 2023-05-26 DIAGNOSIS — N18.31 STAGE 3A CHRONIC KIDNEY DISEASE: Chronic | ICD-10-CM

## 2023-05-26 DIAGNOSIS — R97.20 ELEVATED PSA, LESS THAN 10 NG/ML: Chronic | ICD-10-CM

## 2023-05-26 DIAGNOSIS — E66.3 OVERWEIGHT (BMI 25.0-29.9): Chronic | ICD-10-CM

## 2023-05-26 DIAGNOSIS — R73.01 IMPAIRED FASTING GLUCOSE: Chronic | ICD-10-CM

## 2023-05-26 LAB
ALBUMIN SERPL-MCNC: 4.2 G/DL (ref 3.5–5)
ALBUMIN/GLOB SERPL: 1.5 G/DL (ref 1.1–1.8)
ALP SERPL-CCNC: 66 U/L (ref 38–126)
ALT SERPL W P-5'-P-CCNC: 24 U/L
ANION GAP SERPL CALCULATED.3IONS-SCNC: 5 MMOL/L (ref 5–15)
ARTICHOKE IGE QN: 81 MG/DL (ref 0–100)
AST SERPL-CCNC: 22 U/L (ref 17–59)
BASOPHILS # BLD AUTO: 0.03 10*3/MM3 (ref 0–0.2)
BASOPHILS NFR BLD AUTO: 0.6 % (ref 0–1.5)
BILIRUB SERPL-MCNC: 1 MG/DL (ref 0.2–1.3)
BUN SERPL-MCNC: 23 MG/DL (ref 7–23)
BUN/CREAT SERPL: 13.7 (ref 7–25)
CALCIUM SPEC-SCNC: 9.3 MG/DL (ref 8.4–10.2)
CHLORIDE SERPL-SCNC: 106 MMOL/L (ref 101–112)
CO2 SERPL-SCNC: 29 MMOL/L (ref 22–30)
CREAT SERPL-MCNC: 1.68 MG/DL (ref 0.7–1.3)
DEPRECATED RDW RBC AUTO: 45.8 FL (ref 37–54)
EGFRCR SERPLBLD CKD-EPI 2021: 44.8 ML/MIN/1.73
EOSINOPHIL # BLD AUTO: 0.18 10*3/MM3 (ref 0–0.4)
EOSINOPHIL NFR BLD AUTO: 3.4 % (ref 0.3–6.2)
ERYTHROCYTE [DISTWIDTH] IN BLOOD BY AUTOMATED COUNT: 13.7 % (ref 12.3–15.4)
GLOBULIN UR ELPH-MCNC: 2.8 GM/DL (ref 2.3–3.5)
GLUCOSE SERPL-MCNC: 113 MG/DL (ref 70–99)
HBA1C MFR BLD: 5.4 % (ref 4.8–5.6)
HCT VFR BLD AUTO: 42.8 % (ref 37.5–51)
HGB BLD-MCNC: 14.2 G/DL (ref 13–17.7)
LYMPHOCYTES # BLD AUTO: 1.31 10*3/MM3 (ref 0.7–3.1)
LYMPHOCYTES NFR BLD AUTO: 24.5 % (ref 19.6–45.3)
MCH RBC QN AUTO: 31.3 PG (ref 26.6–33)
MCHC RBC AUTO-ENTMCNC: 33.2 G/DL (ref 31.5–35.7)
MCV RBC AUTO: 94.5 FL (ref 79–97)
MONOCYTES # BLD AUTO: 0.59 10*3/MM3 (ref 0.1–0.9)
MONOCYTES NFR BLD AUTO: 11 % (ref 5–12)
NEUTROPHILS NFR BLD AUTO: 3.23 10*3/MM3 (ref 1.7–7)
NEUTROPHILS NFR BLD AUTO: 60.5 % (ref 42.7–76)
PLATELET # BLD AUTO: 169 10*3/MM3 (ref 140–450)
PMV BLD AUTO: 10.3 FL (ref 6–12)
POTASSIUM SERPL-SCNC: 3.9 MMOL/L (ref 3.4–5)
PROT SERPL-MCNC: 7 G/DL (ref 6.3–8.6)
PSA SERPL-MCNC: 4.48 NG/ML (ref 0–4)
RBC # BLD AUTO: 4.53 10*6/MM3 (ref 4.14–5.8)
SODIUM SERPL-SCNC: 140 MMOL/L (ref 137–145)
VIT B12 BLD-MCNC: 1341 PG/ML (ref 211–946)
WBC NRBC COR # BLD: 5.34 10*3/MM3 (ref 3.4–10.8)

## 2023-05-26 PROCEDURE — 36415 COLL VENOUS BLD VENIPUNCTURE: CPT | Performed by: INTERNAL MEDICINE

## 2023-05-26 PROCEDURE — 82607 VITAMIN B-12: CPT | Performed by: INTERNAL MEDICINE

## 2023-05-26 PROCEDURE — 85025 COMPLETE CBC W/AUTO DIFF WBC: CPT | Performed by: INTERNAL MEDICINE

## 2023-05-26 PROCEDURE — 83721 ASSAY OF BLOOD LIPOPROTEIN: CPT | Performed by: INTERNAL MEDICINE

## 2023-05-26 PROCEDURE — 84153 ASSAY OF PSA TOTAL: CPT | Performed by: INTERNAL MEDICINE

## 2023-05-26 PROCEDURE — 80053 COMPREHEN METABOLIC PANEL: CPT | Performed by: INTERNAL MEDICINE

## 2023-05-26 PROCEDURE — 83036 HEMOGLOBIN GLYCOSYLATED A1C: CPT | Performed by: INTERNAL MEDICINE

## 2023-05-30 NOTE — PROGRESS NOTES
Chief Complaint  Essential hypertension, Hyperlipidemia, Overweight (BMI 25.0-29.9), Impaired fasting glucose, and Stage 3a chronic kidney disease (HCC) - S/P R nephrectomy    Subjective        History of Present Illness     Rashaun Quintanilla presents to the office for 6-month follow up on multiple complex chronic medical issues including hypertension, stage III chronic kidney disease due to right nephrectomy, impaired fasting glucose, essential hypertension, hyperlipidemia, mildly elevated PSA, and GERD among other issues.      He was diagnosed with right renal cell carcinoma 06/2021, for which he underwent right radical nephrectomy and massive IVC thrombectomy at [facility] to address large right renal mass and massive inferior vena cava thrombosis, successfully addressed with surgical nephrectomy and thrombectomy.  He was diagnosed with atrial fibrillation during his postoperative period and continues on aspirin and Toprol-XL.  He has remained in normal sinus rhythm.    His blood pressure is marginal today and still above goal about 30% of the time at home.  I have reviewed his blood pressure diary today.  I again reinforced the benefits of sodium restriction and weight loss.  Weight is up 9 pounds in the past 6 months.  BMI is now 31.    He continues to follow with urology every 6 months.  PSA is improved from six months ago at 4.4, down from 5.2.  MRI prostate with and without contrast done locally looked normal and his Ariane biopsy taken 03/23/2022 was negative.  His renal function has declined with creatinine 1.68 increased 1.47.  GFR is now 45.  He continues to describe BPH symptoms including nocturia and urgency.  I prescribed Flomax 6 months ago, but he has not started it.  After discussion today, he reports that he will try it.    Six months ago, I recommended he decrease oral B-12 to take 6/7 days weekly.  His B-12 is actually higher at 1341.     Other lab results are reviewed with the patient today.   "CBC unremarkable.  LDL trending higher at 81.  Normal liver function.        Objective   Vital Signs:  /82 (BP Location: Left arm, Patient Position: Sitting, Cuff Size: Large Adult)   Pulse 64   Temp 96.7 °F (35.9 °C) (Tympanic)   Ht 185.4 cm (72.99\")   Wt 108 kg (237 lb 3.2 oz)   SpO2 97%   BMI 31.30 kg/m²   Estimated body mass index is 31.3 kg/m² as calculated from the following:    Height as of this encounter: 185.4 cm (72.99\").    Weight as of this encounter: 108 kg (237 lb 3.2 oz).             Physical Exam  Vitals reviewed.   Constitutional:       General: He is not in acute distress.     Appearance: He is well-developed.      Comments: Very pleasant gentleman   HENT:      Head: Normocephalic and atraumatic.      Nose:      Right Sinus: No maxillary sinus tenderness or frontal sinus tenderness.      Left Sinus: No maxillary sinus tenderness or frontal sinus tenderness.      Mouth/Throat:      Mouth: No oral lesions.      Pharynx: Uvula midline.      Tonsils: No tonsillar exudate.   Eyes:      Conjunctiva/sclera: Conjunctivae normal.      Pupils: Pupils are equal, round, and reactive to light.   Neck:      Thyroid: No thyroid mass or thyromegaly.      Vascular: No carotid bruit or JVD.      Trachea: Trachea normal. No tracheal deviation.   Cardiovascular:      Rate and Rhythm: Normal rate and regular rhythm.  No extrasystoles are present.     Chest Wall: PMI is not displaced.      Heart sounds: Normal heart sounds. No murmur heard.  Pulmonary:      Effort: Pulmonary effort is normal. No accessory muscle usage or respiratory distress.      Breath sounds: Normal breath sounds. No decreased breath sounds, wheezing, rhonchi or rales.   Abdominal:      General: Bowel sounds are normal. There is no distension.      Palpations: Abdomen is soft.      Tenderness: There is no abdominal tenderness.      Comments: Overweight/obese abdomen   Musculoskeletal:      Cervical back: Neck supple.   Lymphadenopathy:    "   Cervical: No cervical adenopathy.   Skin:     General: Skin is warm and dry.      Findings: No rash.      Nails: There is no clubbing.   Neurological:      General: No focal deficit present.      Mental Status: He is alert and oriented to person, place, and time. Mental status is at baseline.      Cranial Nerves: No cranial nerve deficit.      Coordination: Coordination normal.   Psychiatric:         Mood and Affect: Mood normal.         Speech: Speech normal.         Behavior: Behavior normal.         Thought Content: Thought content normal.         Judgment: Judgment normal.            Result Review :    CMP        3/22/2023    07:10 5/26/2023    07:08 6/1/2023    07:43   CMP   Glucose 114   113   117     BUN 22   23   19     Creatinine 1.47   1.68   1.59     EGFR 52.6   44.8   47.9     Sodium 141   140   140     Potassium 3.9   3.9   4.0     Chloride 102   106   104     Calcium 9.6   9.3   9.6     Total Protein 7.4   7.0   7.0     Albumin 4.4   4.2   4.2     Globulin 3.0   2.8   2.8     Total Bilirubin 1.0   1.0   1.0     Alkaline Phosphatase 66   66   62     AST (SGOT) 21   22   22     ALT (SGPT) 26   24   24     Albumin/Globulin Ratio 1.5   1.5   1.5     BUN/Creatinine Ratio 15.0   13.7   11.9     Anion Gap 10.0   5.0   8.0       CBC w/diff        11/18/2022    07:10 3/22/2023    07:10 5/26/2023    07:08   CBC w/Diff   WBC 5.53   5.86   5.34     RBC 4.56   4.78   4.53     Hemoglobin 13.8   14.6   14.2     Hematocrit 42.8   44.8   42.8     MCV 93.9   93.7   94.5     MCH 30.3   30.5   31.3     MCHC 32.2   32.6   33.2     RDW 13.7   13.4   13.7     Platelets 185   176   169     Neutrophil Rel % 65.9   59.9   60.5     Lymphocyte Rel % 19.7   24.4   24.5     Monocyte Rel % 10.1   10.4   11.0     Eosinophil Rel % 3.6   3.6   3.4     Basophil Rel % 0.7   1.7   0.6       Lipid Panel        11/18/2022    07:10 5/26/2023    07:08   Lipid Panel   Total Cholesterol 127      Triglycerides 86      HDL Cholesterol 32       VLDL Cholesterol 17      LDL Cholesterol  78   81     LDL/HDL Ratio 2.43        TSH        11/18/2022    07:10   TSH   TSH 1.550       A1C Last 3 Results        11/18/2022    07:10 5/26/2023    07:08   HGBA1C Last 3 Results   Hemoglobin A1C 5.20   5.40       PSA        11/18/2022    07:10 2/6/2023    07:19 5/26/2023    07:08   PSA   PSA 5.270   5.220   4.480       Data reviewed: Consultant notes Urology and Blood pressure diary             Assessment and Plan   Diagnoses and all orders for this visit:    1. Essential hypertension (Primary)  -     Comprehensive Metabolic Panel; Future    2. Mixed hyperlipidemia  -     Lipid Panel; Future  -     TSH; Future    3. Hypertriglyceridemia  -     Lipid Panel; Future    4. Class 1 obesity due to excess calories with serious comorbidity and body mass index (BMI) of 31.0 to 31.9 in adult  -     Comprehensive Metabolic Panel; Future    5. Impaired fasting glucose  -     Hemoglobin A1c; Future    6. Stage 3b chronic kidney disease  -     Comprehensive Metabolic Panel; Future  -     Vitamin D,25-Hydroxy; Future    7. Renal cell carcinoma of right kidney (CMS/HCC) - S/P R radical nephrectomy 6/16/2021  -     Comprehensive Metabolic Panel; Future    8. Other dietary vitamin B12 deficiency anemia  -     CBC Auto Differential; Future  -     Vitamin B12; Future    9. Elevated PSA, less than 10 ng/ml   -     PSA DIAGNOSTIC; Future    10. Benign non-nodular prostatic hyperplasia with lower urinary tract symptoms    Other orders  -     hydrALAZINE (APRESOLINE) 50 MG tablet; 1-2 tabs po TID for htn  Dispense: 360 tablet; Refill: 3  -     rosuvastatin (Crestor) 10 MG tablet; Take 1 tablet by mouth Daily.  Dispense: 90 tablet; Refill: 3    Cholesterol ratio is worsening and LDL is not optimal.  Start Crestor 10 mg daily.  If he experiences statin side effects, we discussed how to use co-Q10 and/or extended interval dosing of every other day dosing.  Reduce fats and cholesterol in the  diet.  Increase exercise.    Blood pressure is suboptimal.  Change hydralazine 50 mg to 1 to 2 tablets 3 times daily, taking an extra tablet whenever systolic pressure is greater than 135 or diastolic greater than 85.  Pursue sodium restriction and weight loss and start regular exercise.    To address the obesity and delay progression of IFG, which is stable, we urged him to start walking 30 to 40 minutes daily and reduce calorie dense foods and fried foods and portion sizes.  This should also help his lipids and high triglycerides and hypertension, as well as being beneficial in regards to his chronic kidney disease.    Continue to avoid NSAIDs and other nephrotoxic drugs in this patient with single kidney due to prior right nephrectomy.  Renal function is still gradually declining.  He is now followed by Dr. Thornton, nephrology.  He is very pleased with his nephrologist.  Hydrate well.  He will continue to follow with urology.    Decrease the vitamin B12 to 4 days/week.    Continue to follow with his urology surgeons in [place].  There has been no evidence of recurrence of RCCA.  I will repeat diagnostic PSA in 6 months.  Consideration could be given to using finasteride instead of Flomax to deal with BPH symptoms in this patient with mildly elevated PSA.    He is having significant BPH symptoms.  We prescribed Flomax previously, but he has not started it.  We discussed it again and he states his intention to start taking Flomax at night to help with nocturia and other BPH symptoms.  This will help reduce the risk that urinary retention is playing a role in his chronic kidney disease.    Return to clinic in 6 months with fasting labs prior.       I spent 42 minutes caring for Rashaun on this date of service. This time includes time spent by me in the following activities:preparing for the visit, reviewing tests, obtaining and/or reviewing a separately obtained history, performing a medically appropriate examination  and/or evaluation , counseling and educating the patient/family/caregiver, ordering medications, tests, or procedures, documenting information in the medical record and independently interpreting results and communicating that information with the patient/family/caregiver         .    Follow Up   Return in about 6 months (around 12/1/2023) for Next scheduled follow up - labs 1 week prior.  Patient was given instructions and counseling regarding his condition or for health maintenance advice. Please see specific information pulled into the AVS if appropriate.

## 2023-06-01 ENCOUNTER — OFFICE VISIT (OUTPATIENT)
Dept: FAMILY MEDICINE CLINIC | Facility: CLINIC | Age: 63
End: 2023-06-01

## 2023-06-01 ENCOUNTER — LAB (OUTPATIENT)
Dept: LAB | Facility: OTHER | Age: 63
End: 2023-06-01
Payer: MEDICARE

## 2023-06-01 VITALS
BODY MASS INDEX: 31.44 KG/M2 | TEMPERATURE: 96.7 F | WEIGHT: 237.2 LBS | SYSTOLIC BLOOD PRESSURE: 138 MMHG | HEIGHT: 73 IN | OXYGEN SATURATION: 97 % | DIASTOLIC BLOOD PRESSURE: 82 MMHG | HEART RATE: 64 BPM

## 2023-06-01 DIAGNOSIS — I10 ESSENTIAL HYPERTENSION: Primary | Chronic | ICD-10-CM

## 2023-06-01 DIAGNOSIS — N40.1 BENIGN NON-NODULAR PROSTATIC HYPERPLASIA WITH LOWER URINARY TRACT SYMPTOMS: Chronic | ICD-10-CM

## 2023-06-01 DIAGNOSIS — N18.30 STAGE 3 CHRONIC KIDNEY DISEASE, UNSPECIFIED WHETHER STAGE 3A OR 3B CKD: ICD-10-CM

## 2023-06-01 DIAGNOSIS — E78.2 MIXED HYPERLIPIDEMIA: Chronic | ICD-10-CM

## 2023-06-01 DIAGNOSIS — R73.01 IMPAIRED FASTING GLUCOSE: Chronic | ICD-10-CM

## 2023-06-01 DIAGNOSIS — D51.3 OTHER DIETARY VITAMIN B12 DEFICIENCY ANEMIA: Chronic | ICD-10-CM

## 2023-06-01 DIAGNOSIS — N18.32 STAGE 3B CHRONIC KIDNEY DISEASE: ICD-10-CM

## 2023-06-01 DIAGNOSIS — C64.1 RENAL CELL CARCINOMA OF RIGHT KIDNEY: ICD-10-CM

## 2023-06-01 DIAGNOSIS — E66.09 CLASS 1 OBESITY DUE TO EXCESS CALORIES WITH SERIOUS COMORBIDITY AND BODY MASS INDEX (BMI) OF 31.0 TO 31.9 IN ADULT: Chronic | ICD-10-CM

## 2023-06-01 DIAGNOSIS — R97.20 ELEVATED PSA, LESS THAN 10 NG/ML: Chronic | ICD-10-CM

## 2023-06-01 DIAGNOSIS — R97.20 ELEVATED PSA: ICD-10-CM

## 2023-06-01 DIAGNOSIS — E21.1 SECONDARY HYPERPARATHYROIDISM, NOT ELSEWHERE CLASSIFIED: ICD-10-CM

## 2023-06-01 DIAGNOSIS — C64.1 RENAL CELL CARCINOMA OF RIGHT KIDNEY: Chronic | ICD-10-CM

## 2023-06-01 DIAGNOSIS — E78.1 HYPERTRIGLYCERIDEMIA: Chronic | ICD-10-CM

## 2023-06-01 PROBLEM — E66.3 OVERWEIGHT (BMI 25.0-29.9): Chronic | Status: RESOLVED | Noted: 2022-05-02 | Resolved: 2023-06-01

## 2023-06-01 LAB
25(OH)D3 SERPL-MCNC: 25.9 NG/ML (ref 30–100)
ALBUMIN SERPL-MCNC: 4.2 G/DL (ref 3.5–5)
ALBUMIN/GLOB SERPL: 1.5 G/DL (ref 1.1–1.8)
ALP SERPL-CCNC: 62 U/L (ref 38–126)
ALT SERPL W P-5'-P-CCNC: 24 U/L
ANION GAP SERPL CALCULATED.3IONS-SCNC: 8 MMOL/L (ref 5–15)
AST SERPL-CCNC: 22 U/L (ref 17–59)
BACTERIA UR QL AUTO: ABNORMAL /HPF
BASOPHILS # BLD AUTO: 0.04 10*3/MM3 (ref 0–0.2)
BASOPHILS NFR BLD AUTO: 0.8 % (ref 0–1.5)
BILIRUB SERPL-MCNC: 1 MG/DL (ref 0.2–1.3)
BILIRUB UR QL STRIP: NEGATIVE
BUN SERPL-MCNC: 19 MG/DL (ref 7–23)
BUN/CREAT SERPL: 11.9 (ref 7–25)
CALCIUM SPEC-SCNC: 9.6 MG/DL (ref 8.4–10.2)
CHLORIDE SERPL-SCNC: 104 MMOL/L (ref 101–112)
CLARITY UR: CLEAR
CO2 SERPL-SCNC: 28 MMOL/L (ref 22–30)
COLOR UR: YELLOW
CREAT SERPL-MCNC: 1.59 MG/DL (ref 0.7–1.3)
CREAT UR-MCNC: 70.3 MG/DL
DEPRECATED RDW RBC AUTO: 45.5 FL (ref 37–54)
EGFRCR SERPLBLD CKD-EPI 2021: 47.9 ML/MIN/1.73
EOSINOPHIL # BLD AUTO: 0.15 10*3/MM3 (ref 0–0.4)
EOSINOPHIL NFR BLD AUTO: 3 % (ref 0.3–6.2)
ERYTHROCYTE [DISTWIDTH] IN BLOOD BY AUTOMATED COUNT: 13.7 % (ref 12.3–15.4)
ERYTHROCYTE [SEDIMENTATION RATE] IN BLOOD: 5 MM/HR (ref 0–15)
GLOBULIN UR ELPH-MCNC: 2.8 GM/DL (ref 2.3–3.5)
GLUCOSE SERPL-MCNC: 117 MG/DL (ref 70–99)
GLUCOSE UR STRIP-MCNC: NEGATIVE MG/DL
HCT VFR BLD AUTO: 43.6 % (ref 37.5–51)
HGB BLD-MCNC: 14.5 G/DL (ref 13–17.7)
HGB UR QL STRIP.AUTO: NEGATIVE
HYALINE CASTS UR QL AUTO: ABNORMAL /LPF
KETONES UR QL STRIP: NEGATIVE
LEUKOCYTE ESTERASE UR QL STRIP.AUTO: NEGATIVE
LYMPHOCYTES # BLD AUTO: 1.12 10*3/MM3 (ref 0.7–3.1)
LYMPHOCYTES NFR BLD AUTO: 22.2 % (ref 19.6–45.3)
MCH RBC QN AUTO: 31.4 PG (ref 26.6–33)
MCHC RBC AUTO-ENTMCNC: 33.3 G/DL (ref 31.5–35.7)
MCV RBC AUTO: 94.4 FL (ref 79–97)
MONOCYTES # BLD AUTO: 0.55 10*3/MM3 (ref 0.1–0.9)
MONOCYTES NFR BLD AUTO: 10.9 % (ref 5–12)
NEUTROPHILS NFR BLD AUTO: 3.18 10*3/MM3 (ref 1.7–7)
NEUTROPHILS NFR BLD AUTO: 63.1 % (ref 42.7–76)
NITRITE UR QL STRIP: NEGATIVE
PH UR STRIP.AUTO: 6.5 [PH] (ref 5.5–8)
PHOSPHATE SERPL-MCNC: 3.3 MG/DL (ref 2.5–4.5)
PLATELET # BLD AUTO: 179 10*3/MM3 (ref 140–450)
PMV BLD AUTO: 10.5 FL (ref 6–12)
POTASSIUM SERPL-SCNC: 4 MMOL/L (ref 3.4–5)
PROT ?TM UR-MCNC: 6.7 MG/DL
PROT SERPL-MCNC: 7 G/DL (ref 6.3–8.6)
PROT UR QL STRIP: NEGATIVE
PROT/CREAT UR: 95.3 MG/G CREA (ref 0–200)
PSA SERPL-MCNC: 4.62 NG/ML (ref 0–4)
PTH-INTACT SERPL-MCNC: 44.1 PG/ML (ref 15–65)
RBC # BLD AUTO: 4.62 10*6/MM3 (ref 4.14–5.8)
RBC # UR STRIP: ABNORMAL /HPF
REF LAB TEST METHOD: ABNORMAL
SODIUM SERPL-SCNC: 140 MMOL/L (ref 137–145)
SP GR UR STRIP: 1.02 (ref 1–1.03)
SQUAMOUS #/AREA URNS HPF: ABNORMAL /HPF
UROBILINOGEN UR QL STRIP: NORMAL
WBC # UR STRIP: ABNORMAL /HPF
WBC NRBC COR # BLD: 5.04 10*3/MM3 (ref 3.4–10.8)

## 2023-06-01 PROCEDURE — 80053 COMPREHEN METABOLIC PANEL: CPT | Performed by: INTERNAL MEDICINE

## 2023-06-01 PROCEDURE — 82570 ASSAY OF URINE CREATININE: CPT | Performed by: INTERNAL MEDICINE

## 2023-06-01 PROCEDURE — 84153 ASSAY OF PSA TOTAL: CPT | Performed by: UROLOGY

## 2023-06-01 PROCEDURE — 84156 ASSAY OF PROTEIN URINE: CPT | Performed by: INTERNAL MEDICINE

## 2023-06-01 PROCEDURE — 81001 URINALYSIS AUTO W/SCOPE: CPT | Performed by: INTERNAL MEDICINE

## 2023-06-01 PROCEDURE — 84100 ASSAY OF PHOSPHORUS: CPT | Performed by: INTERNAL MEDICINE

## 2023-06-01 PROCEDURE — 85651 RBC SED RATE NONAUTOMATED: CPT | Performed by: INTERNAL MEDICINE

## 2023-06-01 PROCEDURE — 36415 COLL VENOUS BLD VENIPUNCTURE: CPT | Performed by: INTERNAL MEDICINE

## 2023-06-01 PROCEDURE — 83970 ASSAY OF PARATHORMONE: CPT | Performed by: INTERNAL MEDICINE

## 2023-06-01 PROCEDURE — 82306 VITAMIN D 25 HYDROXY: CPT | Performed by: UROLOGY

## 2023-06-01 PROCEDURE — 85025 COMPLETE CBC W/AUTO DIFF WBC: CPT | Performed by: INTERNAL MEDICINE

## 2023-06-01 RX ORDER — CHOLECALCIFEROL (VITAMIN D3) 125 MCG
500 CAPSULE ORAL EVERY OTHER DAY
Status: SHIPPED | COMMUNITY
Start: 2023-06-01

## 2023-06-01 RX ORDER — HYDRALAZINE HYDROCHLORIDE 50 MG/1
TABLET, FILM COATED ORAL
Qty: 360 TABLET | Refills: 3 | Status: SHIPPED | OUTPATIENT
Start: 2023-06-01

## 2023-06-01 RX ORDER — ROSUVASTATIN CALCIUM 10 MG/1
10 TABLET, COATED ORAL DAILY
Qty: 90 TABLET | Refills: 3 | Status: SHIPPED | OUTPATIENT
Start: 2023-06-01

## 2023-06-07 ENCOUNTER — TRANSCRIBE ORDERS (OUTPATIENT)
Dept: LAB | Facility: HOSPITAL | Age: 63
End: 2023-06-07
Payer: MEDICARE

## 2023-06-07 DIAGNOSIS — E21.1 SECONDARY HYPERPARATHYROIDISM, NON-RENAL: ICD-10-CM

## 2023-06-07 DIAGNOSIS — N18.30 STAGE 3 CHRONIC KIDNEY DISEASE, UNSPECIFIED WHETHER STAGE 3A OR 3B CKD: Primary | ICD-10-CM

## 2023-07-31 ENCOUNTER — TRANSCRIBE ORDERS (OUTPATIENT)
Dept: LAB | Facility: OTHER | Age: 63
End: 2023-07-31
Payer: MEDICARE

## 2023-07-31 ENCOUNTER — LAB (OUTPATIENT)
Dept: LAB | Facility: OTHER | Age: 63
End: 2023-07-31
Payer: MEDICARE

## 2023-07-31 DIAGNOSIS — R97.20 ELEVATED PSA: ICD-10-CM

## 2023-07-31 DIAGNOSIS — C64.1 RENAL CELL CARCINOMA OF RIGHT KIDNEY: Primary | ICD-10-CM

## 2023-07-31 DIAGNOSIS — C64.1 RENAL CELL CARCINOMA OF RIGHT KIDNEY: ICD-10-CM

## 2023-07-31 DIAGNOSIS — N18.30 STAGE 3 CHRONIC KIDNEY DISEASE, UNSPECIFIED WHETHER STAGE 3A OR 3B CKD: ICD-10-CM

## 2023-07-31 DIAGNOSIS — E21.1 SECONDARY HYPERPARATHYROIDISM, NON-RENAL: ICD-10-CM

## 2023-07-31 LAB
25(OH)D3 SERPL-MCNC: 44.8 NG/ML (ref 30–100)
ALBUMIN SERPL-MCNC: 4.2 G/DL (ref 3.5–5)
ALBUMIN/GLOB SERPL: 1.4 G/DL (ref 1.1–1.8)
ALP SERPL-CCNC: 55 U/L (ref 38–126)
ALT SERPL W P-5'-P-CCNC: 26 U/L
ANION GAP SERPL CALCULATED.3IONS-SCNC: 8 MMOL/L (ref 5–15)
AST SERPL-CCNC: 26 U/L (ref 17–59)
BASOPHILS # BLD AUTO: 0.06 10*3/MM3 (ref 0–0.2)
BASOPHILS NFR BLD AUTO: 1.2 % (ref 0–1.5)
BILIRUB SERPL-MCNC: 1 MG/DL (ref 0.2–1.3)
BUN SERPL-MCNC: 22 MG/DL (ref 7–23)
BUN/CREAT SERPL: 14.7 (ref 7–25)
CALCIUM SPEC-SCNC: 9.4 MG/DL (ref 8.4–10.2)
CHLORIDE SERPL-SCNC: 104 MMOL/L (ref 101–112)
CO2 SERPL-SCNC: 28 MMOL/L (ref 22–30)
CREAT SERPL-MCNC: 1.5 MG/DL (ref 0.7–1.3)
CREAT UR-MCNC: 85.1 MG/DL
DEPRECATED RDW RBC AUTO: 45.2 FL (ref 37–54)
EGFRCR SERPLBLD CKD-EPI 2021: 51.3 ML/MIN/1.73
EOSINOPHIL # BLD AUTO: 0.16 10*3/MM3 (ref 0–0.4)
EOSINOPHIL NFR BLD AUTO: 3.2 % (ref 0.3–6.2)
ERYTHROCYTE [DISTWIDTH] IN BLOOD BY AUTOMATED COUNT: 13.6 % (ref 12.3–15.4)
GLOBULIN UR ELPH-MCNC: 3 GM/DL (ref 2.3–3.5)
GLUCOSE SERPL-MCNC: 121 MG/DL (ref 70–99)
HCT VFR BLD AUTO: 43.2 % (ref 37.5–51)
HGB BLD-MCNC: 14.4 G/DL (ref 13–17.7)
LYMPHOCYTES # BLD AUTO: 1.16 10*3/MM3 (ref 0.7–3.1)
LYMPHOCYTES NFR BLD AUTO: 23.2 % (ref 19.6–45.3)
MCH RBC QN AUTO: 31.6 PG (ref 26.6–33)
MCHC RBC AUTO-ENTMCNC: 33.3 G/DL (ref 31.5–35.7)
MCV RBC AUTO: 94.7 FL (ref 79–97)
MONOCYTES # BLD AUTO: 0.45 10*3/MM3 (ref 0.1–0.9)
MONOCYTES NFR BLD AUTO: 9 % (ref 5–12)
NEUTROPHILS NFR BLD AUTO: 3.16 10*3/MM3 (ref 1.7–7)
NEUTROPHILS NFR BLD AUTO: 63.4 % (ref 42.7–76)
PHOSPHATE SERPL-MCNC: 3.3 MG/DL (ref 2.5–4.5)
PLATELET # BLD AUTO: 163 10*3/MM3 (ref 140–450)
PMV BLD AUTO: 10.3 FL (ref 6–12)
POTASSIUM SERPL-SCNC: 3.9 MMOL/L (ref 3.4–5)
PROT ?TM UR-MCNC: 10 MG/DL
PROT SERPL-MCNC: 7.2 G/DL (ref 6.3–8.6)
PROT/CREAT UR: 117.5 MG/G CREA (ref 0–200)
PSA SERPL-MCNC: 4.15 NG/ML (ref 0–4)
RBC # BLD AUTO: 4.56 10*6/MM3 (ref 4.14–5.8)
SODIUM SERPL-SCNC: 140 MMOL/L (ref 137–145)
WBC NRBC COR # BLD: 4.99 10*3/MM3 (ref 3.4–10.8)

## 2023-07-31 PROCEDURE — 82306 VITAMIN D 25 HYDROXY: CPT | Performed by: INTERNAL MEDICINE

## 2023-07-31 PROCEDURE — 36415 COLL VENOUS BLD VENIPUNCTURE: CPT | Performed by: INTERNAL MEDICINE

## 2023-07-31 PROCEDURE — 84153 ASSAY OF PSA TOTAL: CPT | Performed by: INTERNAL MEDICINE

## 2023-07-31 PROCEDURE — 82570 ASSAY OF URINE CREATININE: CPT | Performed by: INTERNAL MEDICINE

## 2023-07-31 PROCEDURE — 80053 COMPREHEN METABOLIC PANEL: CPT | Performed by: INTERNAL MEDICINE

## 2023-07-31 PROCEDURE — 84156 ASSAY OF PROTEIN URINE: CPT | Performed by: INTERNAL MEDICINE

## 2023-07-31 PROCEDURE — 85025 COMPLETE CBC W/AUTO DIFF WBC: CPT | Performed by: INTERNAL MEDICINE

## 2023-07-31 PROCEDURE — 84100 ASSAY OF PHOSPHORUS: CPT | Performed by: INTERNAL MEDICINE

## 2023-09-08 ENCOUNTER — TRANSCRIBE ORDERS (OUTPATIENT)
Dept: LAB | Facility: OTHER | Age: 63
End: 2023-09-08
Payer: MEDICARE

## 2023-09-08 DIAGNOSIS — N18.31 STAGE 3A CHRONIC KIDNEY DISEASE: Primary | ICD-10-CM

## 2023-09-11 ENCOUNTER — LAB (OUTPATIENT)
Dept: LAB | Facility: OTHER | Age: 63
End: 2023-09-11
Payer: MEDICARE

## 2023-09-11 DIAGNOSIS — N18.31 STAGE 3A CHRONIC KIDNEY DISEASE: ICD-10-CM

## 2023-09-11 LAB
ANION GAP SERPL CALCULATED.3IONS-SCNC: 9 MMOL/L (ref 5–15)
BUN SERPL-MCNC: 17 MG/DL (ref 7–23)
BUN/CREAT SERPL: 11.1 (ref 7–25)
CALCIUM SPEC-SCNC: 9.4 MG/DL (ref 8.4–10.2)
CHLORIDE SERPL-SCNC: 103 MMOL/L (ref 101–112)
CO2 SERPL-SCNC: 30 MMOL/L (ref 22–30)
CREAT SERPL-MCNC: 1.53 MG/DL (ref 0.7–1.3)
EGFRCR SERPLBLD CKD-EPI 2021: 50.1 ML/MIN/1.73
GLUCOSE SERPL-MCNC: 115 MG/DL (ref 70–99)
POTASSIUM SERPL-SCNC: 4.1 MMOL/L (ref 3.4–5)
SODIUM SERPL-SCNC: 142 MMOL/L (ref 137–145)

## 2023-09-11 PROCEDURE — 80048 BASIC METABOLIC PNL TOTAL CA: CPT | Performed by: INTERNAL MEDICINE

## 2023-09-11 PROCEDURE — 36415 COLL VENOUS BLD VENIPUNCTURE: CPT | Performed by: INTERNAL MEDICINE
